# Patient Record
Sex: MALE | Race: WHITE | NOT HISPANIC OR LATINO | Employment: PART TIME | URBAN - METROPOLITAN AREA
[De-identification: names, ages, dates, MRNs, and addresses within clinical notes are randomized per-mention and may not be internally consistent; named-entity substitution may affect disease eponyms.]

---

## 2019-07-29 ENCOUNTER — APPOINTMENT (EMERGENCY)
Dept: RADIOLOGY | Facility: HOSPITAL | Age: 59
End: 2019-07-29
Payer: COMMERCIAL

## 2019-07-29 ENCOUNTER — HOSPITAL ENCOUNTER (EMERGENCY)
Facility: HOSPITAL | Age: 59
Discharge: HOME | End: 2019-07-29
Attending: EMERGENCY MEDICINE | Admitting: EMERGENCY MEDICINE
Payer: COMMERCIAL

## 2019-07-29 VITALS
RESPIRATION RATE: 16 BRPM | HEART RATE: 79 BPM | WEIGHT: 197.53 LBS | BODY MASS INDEX: 31 KG/M2 | SYSTOLIC BLOOD PRESSURE: 176 MMHG | OXYGEN SATURATION: 100 % | TEMPERATURE: 98.5 F | HEIGHT: 67 IN | DIASTOLIC BLOOD PRESSURE: 94 MMHG

## 2019-07-29 DIAGNOSIS — E11.621 DIABETIC FOOT ULCER (HCC): Primary | ICD-10-CM

## 2019-07-29 DIAGNOSIS — L97.509 DIABETIC FOOT ULCER (HCC): Primary | ICD-10-CM

## 2019-07-29 DIAGNOSIS — L03.115 CELLULITIS OF RIGHT LOWER EXTREMITY: Primary | ICD-10-CM

## 2019-07-29 LAB
ANION GAP SERPL CALCULATED.3IONS-SCNC: 10 MMOL/L (ref 4–13)
BASOPHILS # BLD AUTO: 0.09 THOUSANDS/ΜL (ref 0–0.1)
BASOPHILS NFR BLD AUTO: 1 % (ref 0–1)
BUN SERPL-MCNC: 25 MG/DL (ref 5–25)
CALCIUM SERPL-MCNC: 9.3 MG/DL (ref 8.3–10.1)
CHLORIDE SERPL-SCNC: 99 MMOL/L (ref 100–108)
CO2 SERPL-SCNC: 28 MMOL/L (ref 21–32)
CREAT SERPL-MCNC: 1.19 MG/DL (ref 0.6–1.3)
EOSINOPHIL # BLD AUTO: 0.22 THOUSAND/ΜL (ref 0–0.61)
EOSINOPHIL NFR BLD AUTO: 3 % (ref 0–6)
ERYTHROCYTE [DISTWIDTH] IN BLOOD BY AUTOMATED COUNT: 12.1 % (ref 11.6–15.1)
GFR SERPL CREATININE-BSD FRML MDRD: 67 ML/MIN/1.73SQ M
GLUCOSE SERPL-MCNC: 222 MG/DL (ref 65–140)
HCT VFR BLD AUTO: 37.9 % (ref 36.5–49.3)
HGB BLD-MCNC: 12.4 G/DL (ref 12–17)
IMM GRANULOCYTES # BLD AUTO: 0.04 THOUSAND/UL (ref 0–0.2)
IMM GRANULOCYTES NFR BLD AUTO: 1 % (ref 0–2)
LYMPHOCYTES # BLD AUTO: 2 THOUSANDS/ΜL (ref 0.6–4.47)
LYMPHOCYTES NFR BLD AUTO: 23 % (ref 14–44)
MCH RBC QN AUTO: 28.6 PG (ref 26.8–34.3)
MCHC RBC AUTO-ENTMCNC: 32.7 G/DL (ref 31.4–37.4)
MCV RBC AUTO: 88 FL (ref 82–98)
MONOCYTES # BLD AUTO: 0.91 THOUSAND/ΜL (ref 0.17–1.22)
MONOCYTES NFR BLD AUTO: 10 % (ref 4–12)
NEUTROPHILS # BLD AUTO: 5.45 THOUSANDS/ΜL (ref 1.85–7.62)
NEUTS SEG NFR BLD AUTO: 62 % (ref 43–75)
NRBC BLD AUTO-RTO: 0 /100 WBCS
PLATELET # BLD AUTO: 371 THOUSANDS/UL (ref 149–390)
PMV BLD AUTO: 8.8 FL (ref 8.9–12.7)
POTASSIUM SERPL-SCNC: 4.3 MMOL/L (ref 3.5–5.3)
RBC # BLD AUTO: 4.33 MILLION/UL (ref 3.88–5.62)
SODIUM SERPL-SCNC: 137 MMOL/L (ref 136–145)
WBC # BLD AUTO: 8.71 THOUSAND/UL (ref 4.31–10.16)

## 2019-07-29 PROCEDURE — 36415 COLL VENOUS BLD VENIPUNCTURE: CPT | Performed by: EMERGENCY MEDICINE

## 2019-07-29 PROCEDURE — 85025 COMPLETE CBC W/AUTO DIFF WBC: CPT | Performed by: EMERGENCY MEDICINE

## 2019-07-29 PROCEDURE — 99284 EMERGENCY DEPT VISIT MOD MDM: CPT | Performed by: EMERGENCY MEDICINE

## 2019-07-29 PROCEDURE — 99284 EMERGENCY DEPT VISIT MOD MDM: CPT

## 2019-07-29 PROCEDURE — 80048 BASIC METABOLIC PNL TOTAL CA: CPT | Performed by: EMERGENCY MEDICINE

## 2019-07-29 PROCEDURE — 73630 X-RAY EXAM OF FOOT: CPT

## 2019-07-29 RX ORDER — ROSUVASTATIN CALCIUM 5 MG/1
5 TABLET, COATED ORAL
COMMUNITY
End: 2021-09-30 | Stop reason: HOSPADM

## 2019-07-29 RX ORDER — CEPHALEXIN 250 MG/1
500 CAPSULE ORAL EVERY 12 HOURS SCHEDULED
Status: DISCONTINUED | OUTPATIENT
Start: 2019-07-29 | End: 2019-07-29 | Stop reason: HOSPADM

## 2019-07-29 RX ORDER — MELATONIN
5000 DAILY
COMMUNITY

## 2019-07-29 RX ORDER — SACCHAROMYCES BOULARDII 250 MG
250 CAPSULE ORAL 2 TIMES DAILY
Qty: 14 CAPSULE | Refills: 0 | Status: SHIPPED | OUTPATIENT
Start: 2019-07-29 | End: 2019-08-05

## 2019-07-29 RX ORDER — SACCHAROMYCES BOULARDII 250 MG
250 CAPSULE ORAL 2 TIMES DAILY
Status: DISCONTINUED | OUTPATIENT
Start: 2019-07-29 | End: 2019-07-29 | Stop reason: HOSPADM

## 2019-07-29 RX ORDER — CEPHALEXIN 500 MG/1
500 CAPSULE ORAL EVERY 12 HOURS SCHEDULED
Qty: 20 CAPSULE | Refills: 0 | Status: SHIPPED | OUTPATIENT
Start: 2019-07-29 | End: 2019-08-08

## 2019-07-29 RX ORDER — LOSARTAN POTASSIUM AND HYDROCHLOROTHIAZIDE 25; 100 MG/1; MG/1
1 TABLET ORAL DAILY
COMMUNITY
End: 2021-09-30 | Stop reason: HOSPADM

## 2019-07-29 RX ORDER — AMLODIPINE BESYLATE 2.5 MG/1
2.5 TABLET ORAL DAILY
COMMUNITY
End: 2019-09-07

## 2019-07-29 RX ORDER — CEPHALEXIN 250 MG/1
500 CAPSULE ORAL EVERY 12 HOURS SCHEDULED
Status: DISCONTINUED | OUTPATIENT
Start: 2019-07-29 | End: 2019-07-29

## 2019-07-29 RX ADMIN — Medication 250 MG: at 21:18

## 2019-07-29 RX ADMIN — CEPHALEXIN 500 MG: 250 CAPSULE ORAL at 20:40

## 2019-07-29 NOTE — ED NOTES
Kayleighkillian Escalanterebecca, wife, would like to be called for an update about patient  Phone number [phone]       Eunice Bueno RN  07/29/19 [id number]

## 2019-07-29 NOTE — ED PROVIDER NOTES
History  Chief Complaint   Patient presents with    Foot Ulcer     pt came to ER on direction of FMD for eval and treatment of ulcer on right foot  Pt states he did not realize that his foot was rubbing inside his shoe  +drainage  +malodorous     64-year-old male with a history of hypertension, diabetes, diabetic neuropathy presents with a 2 to three-week history of ulceration to the right foot  He states he has noticed that it has increased in size and has an odor which prompted his visit to the family doctor who referred him here  He denies any trauma  No fevers or chills  History provided by:  Patient   used: No    Medical Problem   Location:  Right foot  Severity:  Unable to specify  Onset quality:  Gradual  Duration:  2 weeks  Timing:  Constant  Progression:  Worsening  Chronicity:  New  Worsened by:  Nothing  Ineffective treatments:  None tried  Associated symptoms: no fever, no headaches and no rash        Prior to Admission Medications   Prescriptions Last Dose Informant Patient Reported? Taking? OMEGA-3 FATTY ACIDS PO   Yes Yes   Sig: Take 1,000 mg by mouth daily   amLODIPine (NORVASC) 2 5 mg tablet   Yes Yes   Sig: Take 2 5 mg by mouth daily   cholecalciferol (VITAMIN D3) 1,000 units tablet   Yes Yes   Sig: Take 5,000 Units by mouth daily   losartan-hydrochlorothiazide (HYZAAR) 100-25 MG per tablet   Yes Yes   Sig: Take 1 tablet by mouth daily   metFORMIN (GLUCOPHAGE) 1000 MG tablet   Yes Yes   Sig: Take 1,000 mg by mouth 2 (two) times a day with meals   rosuvastatin (CRESTOR) 5 mg tablet   Yes Yes   Sig: Take 5 mg by mouth daily      Facility-Administered Medications: None       Past Medical History:   Diagnosis Date    Diabetes mellitus ([address] )     Hypertension        History reviewed  No pertinent surgical history  History reviewed  No pertinent family history  I have reviewed and agree with the history as documented      Social History     Tobacco Use    Smoking status: Never Smoker    Smokeless tobacco: Never Used   Substance Use Topics    Alcohol use: Yes     Alcohol/week: 30 0 standard drinks     Types: 30 Cans of beer per week     Comment: 5-6 beers 4-5 x per week     Drug use: Never        Review of Systems   Constitutional: Negative  Negative for chills and fever  HENT: Negative  Eyes: Negative  Respiratory: Negative  Cardiovascular: Negative  Gastrointestinal: Negative  Genitourinary: Negative  Musculoskeletal: Negative for neck pain  Skin: Negative  Negative for rash  Allergic/Immunologic: Negative  Neurological: Negative  Negative for weakness, numbness and headaches  Hematological: Negative  Psychiatric/Behavioral: Negative  All other systems reviewed and are negative  Physical Exam  Physical Exam   Constitutional: He is oriented to person, place, and time  He appears well-developed and well-nourished  Non-toxic appearance  He does not have a sickly appearance  He does not appear ill  No distress  HENT:   Head: Normocephalic and atraumatic  Right Ear: External ear normal    Left Ear: External ear normal    Eyes: Pupils are equal, round, and reactive to light  Conjunctivae are normal  No scleral icterus  Cardiovascular: Normal rate, regular rhythm and normal heart sounds  Pulmonary/Chest: Effort normal and breath sounds normal    Musculoskeletal: Normal range of motion  He exhibits no edema, tenderness or deformity  See clinical images on chart  Malodorous foot ulceration to the plantar aspect right 5th toe  There is some maceration of the skin and central area of necrosis  No purulent drainage  No surrounding erythema  The foot is warm with palpable pulses  Lymphadenopathy:     He has no cervical adenopathy  Neurological: He is alert and oriented to person, place, and time  He has normal strength and normal reflexes  He exhibits normal muscle tone  Skin: Skin is warm and dry  No rash noted   He is not diaphoretic  No erythema  No pallor  Psychiatric: He has a normal mood and affect  Nursing note and vitals reviewed  Vital Signs  ED Triage Vitals [07/29/19 1633]   Temperature Pulse Respirations Blood Pressure SpO2   98 5 °F (36 9 °C) 85 16 (!) 174/85 97 %      Temp Source Heart Rate Source Patient Position - Orthostatic VS BP Location FiO2 (%)   Temporal Monitor Sitting Right arm --      Pain Score       3           Vitals:    07/29/19 1633   BP: (!) 174/85   Pulse: 85   Patient Position - Orthostatic VS: Sitting         Visual Acuity      ED Medications  Medications - No data to display    Diagnostic Studies  Results Reviewed     Procedure Component Value Units Date/Time    CBC and differential [[id number]]     Lab Status:  No result Specimen:  Blood     Basic metabolic panel [[id number]]     Lab Status:  No result Specimen:  Blood                  XR foot 3+ views RIGHT    (Results Pending)              Procedures  Procedures       ED Course                               MDM  Number of Diagnoses or Management Options  Diagnosis management comments: 60-year-old male presents with ulceration to the volar aspect of the right foot  He thinks this has been present for the last few weeks  It seems to be getting larger in size and was malodorous which prompted his visit to the family doctor today who sent him here  He has had no systemic symptoms  No prior history of diabetic ulcers  On exam he appears well in no distress  There is a rather large ulcer to the volar aspect of the lateral right foot  No surrounding erythema  Will obtain basic labs, x-ray of the foot to rule out osteomyelitis or gas gangrene    Podiatry consulted and will see patient       Amount and/or Complexity of Data Reviewed  Clinical lab tests: ordered and reviewed  Tests in the radiology section of CPT®: ordered and reviewed        Disposition  Final diagnoses:   None     ED Disposition     None      Follow-up Information None         Patient's Medications   Discharge Prescriptions    No medications on file     No discharge procedures on file      ED Provider  Electronically Signed by           Brown Lay DO  07/30/19 8406

## 2019-07-29 NOTE — ED NOTES
Pt states he has also been in the [facility] every weekend which may contribute to the infection on his foot        Zheng Navarro RN  07/29/19 [id number]

## 2019-07-30 PROCEDURE — 11042 DBRDMT SUBQ TIS 1ST 20SQCM/<: CPT | Performed by: PODIATRIST

## 2019-07-30 PROCEDURE — 99283 EMERGENCY DEPT VISIT LOW MDM: CPT | Performed by: PODIATRIST

## 2019-07-30 PROCEDURE — 11045 DBRDMT SUBQ TISS EACH ADDL: CPT | Performed by: PODIATRIST

## 2019-07-30 NOTE — DISCHARGE INSTRUCTIONS
Discharge Instructions - Podiatry    Weight Bearing Status:   Weight bear as tolerated  We passed this can all be offloaded in a special shoe or with padding around the wound  Pain: Continue analgesics as directed    F/u appointment Instructions: Please make an appointment within one week of discharge with Dr Luciano Tuttle  Contact sooner if any increase in pain, or signs of infection occur    Wound Care:  Keep the dressing clean dry and intact  If the dressing is to get wet please replace it with Betadine on the wound and dry sterile dressing

## 2019-07-30 NOTE — CONSULTS
Consult - Podiatry   Marti Christianson 63 y o  male MRN: [id number]  Unit/Bed#: ED 25 Encounter: [id number]    Assessment/Plan     Assessment:    1  Wound of the right plantar foot  Appears stable with no signs of systemic infection  2  Type 2 diabetes mellitus, poorly controlled  3  Peripheral Neuropathy    Plan:  · Pre-debridement wound measures 8cm x 5cm  Following a time out and verbal consent, Excisional debridement was performed with a #10 blade to remove non-viable necrotic tissue, skin and fat  Hemostasis was controlled with pressure  No pain noted during procedure  Post debridement measures 8 cm x 5cm x 0 3 cm  for a total of 40 square centimeters  Wound appears primarily granular, boggy  · He was educated on proper wound care  An importance of  clinician for care  · His educated on the importance of tight blood sugar control  · Is educated on the importance of keeping the wound dry  · He is prescribed Keflex 500 mg b i d  For 7 days, with prophylactic probiotic  · [facility] Vasili[facility] Dewey within 1 week    History of Present Illness     HPI:  Marti Christianson is a 63 y o  male who presents with right foot wound, right leg edema  He states that he has had this right foot plantar wound for about 2 weeks  He has done no local wound care  He states that he has been washing the wound at the end of work with no bandage placement following  Consults  Review of Systems   Constitutional: Negative  HENT: Negative  Eyes: Negative  Respiratory: Negative  Cardiovascular: Negative  Gastrointestinal: Negative  Musculoskeletal: negative    Skin:right foot wound   Neurological: Negative  Psych: Negative  Historical Information   Past Medical History:   Diagnosis Date    Diabetes mellitus ([facility] )     Hypertension      History reviewed  No pertinent surgical history    Social History   Social History     Substance and Sexual Activity Alcohol Use Yes    Alcohol/week: 30 0 standard drinks    Types: 30 Cans of beer per week    Comment: 5-6 beers 4-5 x per week      Social History     Substance and Sexual Activity   Drug Use Never     Social History     Tobacco Use   Smoking Status Never Smoker   Smokeless Tobacco Never Used     Family History: History reviewed  No pertinent family history  Meds/Allergies     (Not in a hospital admission)  No Known Allergies    Objective   First Vitals:   Blood Pressure: (!) 174/85 (07/29/19 1633)  Pulse: 85 (07/29/19 1633)  Temperature: 98 5 °F (36 9 °C) (07/29/19 1633)  Temp Source: Temporal (07/29/19 1633)  Respirations: 16 (07/29/19 1633)  Height: 5' 7" (170 2 cm) (07/29/19 1633)  Weight - Scale: 89 6 kg (197 lb 8 5 oz) (07/29/19 1633)  SpO2: 97 % (07/29/19 1633)    Current Vitals:   Blood Pressure: (!) 176/94 (07/29/19 1925)  Pulse: 79 (07/29/19 1925)  Temperature: 98 5 °F (36 9 °C) (07/29/19 1633)  Temp Source: Temporal (07/29/19 1633)  Respirations: 16 (07/29/19 1925)  Height: 5' 7" (170 2 cm) (07/29/19 1633)  Weight - Scale: 89 6 kg (197 lb 8 5 oz) (07/29/19 1633)  SpO2: 100 % (07/29/19 1925)        BP (!) 176/94 (BP Location: Right arm)   Pulse 79   Temp 98 5 °F (36 9 °C) (Temporal)   Resp 16   Ht 5' 7" (1 702 m)   Wt 89 6 kg (197 lb 8 5 oz)   SpO2 100%   BMI 30 94 kg/m²      General Appearance:    Alert, cooperative, no distress   Head:    Normocephalic, without obvious abnormality, atraumatic   Eyes:    PERRL, conjunctiva/corneas clear      Nose:   Moist mucous membranes   Neck:   Supple, symmetrical, trachea midline   Back:     Symmetric   Lungs:     Respirations unlabored   Heart:    Regular rate and rhythm   Abdomen:     Soft, non-tender   Extremities:   MMT is 5/5 to all compartments of the LE, +2/4 edema to right leg, edema 0/4 to left leg  No pain on palpation noted bilaterally  No calf tenderness noted bilaterally  Pulses:   Pedal pulses are palpable B/l, CFT< 3sec to all digits  Pedal hair is Absent   Skin:   Right foot plantar wound measuring approximately 8 cm x 5 cm  Post debridement primarily granular base with fibrotic plug in Center  Negative probe to bone  Malodor is noted on exam   No surrounding erythema  Wound is boggy to the touch  Periwound has hyperkeratotic ring  No purulence is noted on expression  No pain on palpation of the periwound proximal or dorsal    Neurologic:   Gross sensation is Diminished  Protective sensation is Diminished  Right foot      Lab, Imaging and other studies:   I have personally reviewed pertinent lab results  , CBC:   Lab Results   Component Value Date    WBC 8 71 07/29/2019    HGB 12 4 07/29/2019    HCT 37 9 07/29/2019    MCV 88 07/29/2019     07/29/2019    MCH 28 6 07/29/2019    MCHC 32 7 07/29/2019    RDW 12 1 07/29/2019    MPV 8 8 (L) 07/29/2019    NRBC 0 07/29/2019   , CMP:   Lab Results   Component Value Date    SODIUM 137 07/29/2019    K 4 3 07/29/2019    CL 99 (L) 07/29/2019    CO2 28 07/29/2019    BUN 25 07/29/2019    CREATININE 1 19 07/29/2019    CALCIUM 9 3 07/29/2019    EGFR 67 07/29/2019         Imaging: I have personally reviewed pertinent films in PACS  EKG, Pathology, and Other Studies: I have personally reviewed pertinent reports  Portions of the record may have been created with voice recognition software  Occasional wrong word or "sound a like" substitutions may have occurred due to the inherent limitations of voice recognition software  Read the chart carefully and recognize, using context, where substitutions have occurred

## 2019-08-06 ENCOUNTER — APPOINTMENT (OUTPATIENT)
Dept: WOUND CARE | Facility: HOSPITAL | Age: 59
End: 2019-08-06
Payer: COMMERCIAL

## 2019-08-06 PROCEDURE — G0463 HOSPITAL OUTPT CLINIC VISIT: HCPCS | Performed by: PODIATRIST

## 2019-08-06 PROCEDURE — 11042 DBRDMT SUBQ TIS 1ST 20SQCM/<: CPT | Performed by: PODIATRIST

## 2019-08-06 PROCEDURE — 99213 OFFICE O/P EST LOW 20 MIN: CPT | Performed by: PODIATRIST

## 2019-08-13 ENCOUNTER — APPOINTMENT (OUTPATIENT)
Dept: WOUND CARE | Facility: HOSPITAL | Age: 59
End: 2019-08-13
Payer: COMMERCIAL

## 2019-08-13 PROCEDURE — 11042 DBRDMT SUBQ TIS 1ST 20SQCM/<: CPT

## 2019-08-27 ENCOUNTER — APPOINTMENT (OUTPATIENT)
Dept: WOUND CARE | Facility: HOSPITAL | Age: 59
End: 2019-08-27
Payer: COMMERCIAL

## 2019-08-27 PROCEDURE — 11042 DBRDMT SUBQ TIS 1ST 20SQCM/<: CPT

## 2019-09-07 ENCOUNTER — HOSPITAL ENCOUNTER (INPATIENT)
Facility: HOSPITAL | Age: 59
LOS: 4 days | Discharge: HOME | DRG: 853 | End: 2019-09-11
Attending: EMERGENCY MEDICINE | Admitting: PODIATRIST
Payer: COMMERCIAL

## 2019-09-07 ENCOUNTER — ANESTHESIA EVENT (INPATIENT)
Dept: PERIOP | Facility: HOSPITAL | Age: 59
DRG: 853 | End: 2019-09-07
Payer: COMMERCIAL

## 2019-09-07 ENCOUNTER — APPOINTMENT (INPATIENT)
Dept: RADIOLOGY | Facility: HOSPITAL | Age: 59
DRG: 853 | End: 2019-09-07
Payer: COMMERCIAL

## 2019-09-07 ENCOUNTER — APPOINTMENT (INPATIENT)
Dept: CT IMAGING | Facility: HOSPITAL | Age: 59
DRG: 853 | End: 2019-09-07
Payer: COMMERCIAL

## 2019-09-07 ENCOUNTER — APPOINTMENT (EMERGENCY)
Dept: RADIOLOGY | Facility: HOSPITAL | Age: 59
DRG: 853 | End: 2019-09-07
Payer: COMMERCIAL

## 2019-09-07 DIAGNOSIS — L03.115 CELLULITIS OF RIGHT LOWER EXTREMITY: ICD-10-CM

## 2019-09-07 DIAGNOSIS — Z98.890 S/P FOOT SURGERY: ICD-10-CM

## 2019-09-07 DIAGNOSIS — E11.42 TYPE 2 DIABETES MELLITUS WITH DIABETIC POLYNEUROPATHY, WITHOUT LONG-TERM CURRENT USE OF INSULIN (HCC): ICD-10-CM

## 2019-09-07 DIAGNOSIS — Z79.4 TYPE 2 DIABETES MELLITUS WITH DIABETIC POLYNEUROPATHY, WITH LONG-TERM CURRENT USE OF INSULIN (HCC): ICD-10-CM

## 2019-09-07 DIAGNOSIS — I10 ESSENTIAL HYPERTENSION: ICD-10-CM

## 2019-09-07 DIAGNOSIS — E11.42 TYPE 2 DIABETES MELLITUS WITH DIABETIC POLYNEUROPATHY, WITH LONG-TERM CURRENT USE OF INSULIN (HCC): ICD-10-CM

## 2019-09-07 DIAGNOSIS — A48.0 GAS GANGRENE (HCC): Primary | ICD-10-CM

## 2019-09-07 LAB
ANION GAP SERPL CALCULATED.3IONS-SCNC: 9 MMOL/L (ref 4–13)
BACTERIA UR QL AUTO: ABNORMAL /HPF
BASOPHILS # BLD AUTO: 0.06 THOUSANDS/ΜL (ref 0–0.1)
BASOPHILS NFR BLD AUTO: 0 % (ref 0–1)
BILIRUB UR QL STRIP: NEGATIVE
BUN SERPL-MCNC: 19 MG/DL (ref 5–25)
CALCIUM SERPL-MCNC: 10.2 MG/DL (ref 8.3–10.1)
CHLORIDE SERPL-SCNC: 94 MMOL/L (ref 100–108)
CK SERPL-CCNC: 117 U/L (ref 39–308)
CLARITY UR: CLEAR
CO2 SERPL-SCNC: 26 MMOL/L (ref 21–32)
COLOR UR: YELLOW
COLOR, POC: YELLOW
CREAT SERPL-MCNC: 1.1 MG/DL (ref 0.6–1.3)
EOSINOPHIL # BLD AUTO: 0.05 THOUSAND/ΜL (ref 0–0.61)
EOSINOPHIL NFR BLD AUTO: 0 % (ref 0–6)
ERYTHROCYTE [DISTWIDTH] IN BLOOD BY AUTOMATED COUNT: 12.3 % (ref 11.6–15.1)
GFR SERPL CREATININE-BSD FRML MDRD: 74 ML/MIN/1.73SQ M
GLUCOSE SERPL-MCNC: 220 MG/DL (ref 65–140)
GLUCOSE SERPL-MCNC: 310 MG/DL (ref 65–140)
GLUCOSE UR STRIP-MCNC: NEGATIVE MG/DL
HCT VFR BLD AUTO: 34.4 % (ref 36.5–49.3)
HGB BLD-MCNC: 11.6 G/DL (ref 12–17)
HGB UR QL STRIP.AUTO: ABNORMAL
IMM GRANULOCYTES # BLD AUTO: 0.09 THOUSAND/UL (ref 0–0.2)
IMM GRANULOCYTES NFR BLD AUTO: 1 % (ref 0–2)
KETONES UR STRIP-MCNC: ABNORMAL MG/DL
LEUKOCYTE ESTERASE UR QL STRIP: NEGATIVE
LYMPHOCYTES # BLD AUTO: 1.55 THOUSANDS/ΜL (ref 0.6–4.47)
LYMPHOCYTES NFR BLD AUTO: 10 % (ref 14–44)
MCH RBC QN AUTO: 28.8 PG (ref 26.8–34.3)
MCHC RBC AUTO-ENTMCNC: 33.7 G/DL (ref 31.4–37.4)
MCV RBC AUTO: 85 FL (ref 82–98)
MONOCYTES # BLD AUTO: 1.6 THOUSAND/ΜL (ref 0.17–1.22)
MONOCYTES NFR BLD AUTO: 11 % (ref 4–12)
NEUTROPHILS # BLD AUTO: 11.89 THOUSANDS/ΜL (ref 1.85–7.62)
NEUTS SEG NFR BLD AUTO: 78 % (ref 43–75)
NITRITE UR QL STRIP: NEGATIVE
NON-SQ EPI CELLS URNS QL MICRO: ABNORMAL /HPF
NRBC BLD AUTO-RTO: 0 /100 WBCS
PH UR STRIP.AUTO: 5.5 [PH] (ref 4.5–8)
PLATELET # BLD AUTO: 315 THOUSANDS/UL (ref 149–390)
PMV BLD AUTO: 9.7 FL (ref 8.9–12.7)
POTASSIUM SERPL-SCNC: 3.7 MMOL/L (ref 3.5–5.3)
PROT UR STRIP-MCNC: ABNORMAL MG/DL
RBC # BLD AUTO: 4.03 MILLION/UL (ref 3.88–5.62)
RBC #/AREA URNS AUTO: ABNORMAL /HPF
SODIUM SERPL-SCNC: 129 MMOL/L (ref 136–145)
SP GR UR STRIP.AUTO: 1.01 (ref 1–1.03)
UROBILINOGEN UR QL STRIP.AUTO: 0.2 E.U./DL
WBC # BLD AUTO: 15.24 THOUSAND/UL (ref 4.31–10.16)
WBC #/AREA URNS AUTO: ABNORMAL /HPF

## 2019-09-07 PROCEDURE — 87070 CULTURE OTHR SPECIMN AEROBIC: CPT | Performed by: STUDENT IN AN ORGANIZED HEALTH CARE EDUCATION/TRAINING PROGRAM

## 2019-09-07 PROCEDURE — 73610 X-RAY EXAM OF ANKLE: CPT

## 2019-09-07 PROCEDURE — 82550 ASSAY OF CK (CPK): CPT | Performed by: EMERGENCY MEDICINE

## 2019-09-07 PROCEDURE — 99223 1ST HOSP IP/OBS HIGH 75: CPT | Performed by: PODIATRIST

## 2019-09-07 PROCEDURE — 96360 HYDRATION IV INFUSION INIT: CPT

## 2019-09-07 PROCEDURE — 73701 CT LOWER EXTREMITY W/DYE: CPT

## 2019-09-07 PROCEDURE — 73630 X-RAY EXAM OF FOOT: CPT

## 2019-09-07 PROCEDURE — 85025 COMPLETE CBC W/AUTO DIFF WBC: CPT | Performed by: EMERGENCY MEDICINE

## 2019-09-07 PROCEDURE — 87147 CULTURE TYPE IMMUNOLOGIC: CPT | Performed by: STUDENT IN AN ORGANIZED HEALTH CARE EDUCATION/TRAINING PROGRAM

## 2019-09-07 PROCEDURE — 81001 URINALYSIS AUTO W/SCOPE: CPT

## 2019-09-07 PROCEDURE — 36415 COLL VENOUS BLD VENIPUNCTURE: CPT | Performed by: EMERGENCY MEDICINE

## 2019-09-07 PROCEDURE — 87075 CULTR BACTERIA EXCEPT BLOOD: CPT | Performed by: STUDENT IN AN ORGANIZED HEALTH CARE EDUCATION/TRAINING PROGRAM

## 2019-09-07 PROCEDURE — 80048 BASIC METABOLIC PNL TOTAL CA: CPT | Performed by: EMERGENCY MEDICINE

## 2019-09-07 PROCEDURE — 71045 X-RAY EXAM CHEST 1 VIEW: CPT

## 2019-09-07 PROCEDURE — 87040 BLOOD CULTURE FOR BACTERIA: CPT | Performed by: STUDENT IN AN ORGANIZED HEALTH CARE EDUCATION/TRAINING PROGRAM

## 2019-09-07 PROCEDURE — 87186 SC STD MICRODIL/AGAR DIL: CPT | Performed by: STUDENT IN AN ORGANIZED HEALTH CARE EDUCATION/TRAINING PROGRAM

## 2019-09-07 PROCEDURE — 83036 HEMOGLOBIN GLYCOSYLATED A1C: CPT | Performed by: STUDENT IN AN ORGANIZED HEALTH CARE EDUCATION/TRAINING PROGRAM

## 2019-09-07 PROCEDURE — 99285 EMERGENCY DEPT VISIT HI MDM: CPT

## 2019-09-07 PROCEDURE — 82948 REAGENT STRIP/BLOOD GLUCOSE: CPT

## 2019-09-07 PROCEDURE — 99284 EMERGENCY DEPT VISIT MOD MDM: CPT | Performed by: EMERGENCY MEDICINE

## 2019-09-07 PROCEDURE — 87205 SMEAR GRAM STAIN: CPT | Performed by: STUDENT IN AN ORGANIZED HEALTH CARE EDUCATION/TRAINING PROGRAM

## 2019-09-07 PROCEDURE — 0JBQ0ZZ EXCISION OF RIGHT FOOT SUBCUTANEOUS TISSUE AND FASCIA, OPEN APPROACH: ICD-10-PCS | Performed by: PODIATRIST

## 2019-09-07 RX ORDER — OXYCODONE HYDROCHLORIDE AND ACETAMINOPHEN 5; 325 MG/1; MG/1
1 TABLET ORAL EVERY 6 HOURS PRN
Status: DISCONTINUED | OUTPATIENT
Start: 2019-09-07 | End: 2019-09-11 | Stop reason: HOSPADM

## 2019-09-07 RX ORDER — SODIUM CHLORIDE 9 MG/ML
125 INJECTION, SOLUTION INTRAVENOUS CONTINUOUS
Status: DISCONTINUED | OUTPATIENT
Start: 2019-09-07 | End: 2019-09-09

## 2019-09-07 RX ORDER — PRAVASTATIN SODIUM 40 MG
40 TABLET ORAL
Status: DISCONTINUED | OUTPATIENT
Start: 2019-09-08 | End: 2019-09-11 | Stop reason: HOSPADM

## 2019-09-07 RX ORDER — ACETAMINOPHEN 325 MG/1
650 TABLET ORAL EVERY 6 HOURS PRN
Status: DISCONTINUED | OUTPATIENT
Start: 2019-09-07 | End: 2019-09-09

## 2019-09-07 RX ORDER — LIDOCAINE HYDROCHLORIDE 10 MG/ML
10 INJECTION, SOLUTION EPIDURAL; INFILTRATION; INTRACAUDAL; PERINEURAL ONCE
Status: COMPLETED | OUTPATIENT
Start: 2019-09-07 | End: 2019-09-07

## 2019-09-07 RX ORDER — SODIUM CHLORIDE 9 MG/ML
125 INJECTION, SOLUTION INTRAVENOUS CONTINUOUS
Status: DISCONTINUED | OUTPATIENT
Start: 2019-09-07 | End: 2019-09-08

## 2019-09-07 RX ADMIN — SODIUM CHLORIDE 1000 ML: 0.9 INJECTION, SOLUTION INTRAVENOUS at 20:45

## 2019-09-07 RX ADMIN — SODIUM CHLORIDE 125 ML/HR: 0.9 INJECTION, SOLUTION INTRAVENOUS at 23:47

## 2019-09-07 RX ADMIN — LIDOCAINE HYDROCHLORIDE 10 ML: 10 INJECTION, SOLUTION EPIDURAL; INFILTRATION; INTRACAUDAL; PERINEURAL at 22:31

## 2019-09-07 RX ADMIN — IOHEXOL 100 ML: 350 INJECTION, SOLUTION INTRAVENOUS at 23:34

## 2019-09-07 RX ADMIN — VANCOMYCIN HYDROCHLORIDE 1250 MG: 1 INJECTION, POWDER, LYOPHILIZED, FOR SOLUTION INTRAVENOUS at 23:46

## 2019-09-07 RX ADMIN — CEFEPIME HYDROCHLORIDE 2000 MG: 2 INJECTION, POWDER, FOR SOLUTION INTRAVENOUS at 22:42

## 2019-09-08 ENCOUNTER — APPOINTMENT (INPATIENT)
Dept: RADIOLOGY | Facility: HOSPITAL | Age: 59
DRG: 853 | End: 2019-09-08
Payer: COMMERCIAL

## 2019-09-08 ENCOUNTER — ANESTHESIA (INPATIENT)
Dept: PERIOP | Facility: HOSPITAL | Age: 59
DRG: 853 | End: 2019-09-08
Payer: COMMERCIAL

## 2019-09-08 LAB
ANION GAP SERPL CALCULATED.3IONS-SCNC: 7 MMOL/L (ref 4–13)
BUN SERPL-MCNC: 16 MG/DL (ref 5–25)
CALCIUM SERPL-MCNC: 9.6 MG/DL (ref 8.3–10.1)
CHLORIDE SERPL-SCNC: 102 MMOL/L (ref 100–108)
CO2 SERPL-SCNC: 25 MMOL/L (ref 21–32)
CREAT SERPL-MCNC: 0.95 MG/DL (ref 0.6–1.3)
ERYTHROCYTE [DISTWIDTH] IN BLOOD BY AUTOMATED COUNT: 12.4 % (ref 11.6–15.1)
EST. AVERAGE GLUCOSE BLD GHB EST-MCNC: 209 MG/DL
GFR SERPL CREATININE-BSD FRML MDRD: 88 ML/MIN/1.73SQ M
GLUCOSE SERPL-MCNC: 164 MG/DL (ref 65–140)
GLUCOSE SERPL-MCNC: 185 MG/DL (ref 65–140)
GLUCOSE SERPL-MCNC: 224 MG/DL (ref 65–140)
GLUCOSE SERPL-MCNC: 252 MG/DL (ref 65–140)
GLUCOSE SERPL-MCNC: 255 MG/DL (ref 65–140)
GLUCOSE SERPL-MCNC: 278 MG/DL (ref 65–140)
GLUCOSE SERPL-MCNC: 296 MG/DL (ref 65–140)
HBA1C MFR BLD: 8.9 % (ref 4.2–6.3)
HCT VFR BLD AUTO: 33.7 % (ref 36.5–49.3)
HGB BLD-MCNC: 11 G/DL (ref 12–17)
MCH RBC QN AUTO: 28.7 PG (ref 26.8–34.3)
MCHC RBC AUTO-ENTMCNC: 32.6 G/DL (ref 31.4–37.4)
MCV RBC AUTO: 88 FL (ref 82–98)
PLATELET # BLD AUTO: 260 THOUSANDS/UL (ref 149–390)
PMV BLD AUTO: 9.3 FL (ref 8.9–12.7)
POTASSIUM SERPL-SCNC: 4.1 MMOL/L (ref 3.5–5.3)
RBC # BLD AUTO: 3.83 MILLION/UL (ref 3.88–5.62)
SODIUM SERPL-SCNC: 134 MMOL/L (ref 136–145)
WBC # BLD AUTO: 9.52 THOUSAND/UL (ref 4.31–10.16)

## 2019-09-08 PROCEDURE — 87070 CULTURE OTHR SPECIMN AEROBIC: CPT | Performed by: PODIATRIST

## 2019-09-08 PROCEDURE — 87075 CULTR BACTERIA EXCEPT BLOOD: CPT | Performed by: PODIATRIST

## 2019-09-08 PROCEDURE — 87186 SC STD MICRODIL/AGAR DIL: CPT | Performed by: PODIATRIST

## 2019-09-08 PROCEDURE — 0Y6M0ZF DETACHMENT AT RIGHT FOOT, PARTIAL 5TH RAY, OPEN APPROACH: ICD-10-PCS | Performed by: PODIATRIST

## 2019-09-08 PROCEDURE — 99252 IP/OBS CONSLTJ NEW/EST SF 35: CPT | Performed by: INTERNAL MEDICINE

## 2019-09-08 PROCEDURE — 93005 ELECTROCARDIOGRAM TRACING: CPT

## 2019-09-08 PROCEDURE — 87205 SMEAR GRAM STAIN: CPT | Performed by: PODIATRIST

## 2019-09-08 PROCEDURE — 88307 TISSUE EXAM BY PATHOLOGIST: CPT | Performed by: PATHOLOGY

## 2019-09-08 PROCEDURE — 87147 CULTURE TYPE IMMUNOLOGIC: CPT | Performed by: PODIATRIST

## 2019-09-08 PROCEDURE — 82948 REAGENT STRIP/BLOOD GLUCOSE: CPT

## 2019-09-08 PROCEDURE — 14350 FILLETED FINGER/TOE FLAP: CPT | Performed by: PODIATRIST

## 2019-09-08 PROCEDURE — 85027 COMPLETE CBC AUTOMATED: CPT | Performed by: STUDENT IN AN ORGANIZED HEALTH CARE EDUCATION/TRAINING PROGRAM

## 2019-09-08 PROCEDURE — 99024 POSTOP FOLLOW-UP VISIT: CPT | Performed by: PODIATRIST

## 2019-09-08 PROCEDURE — 88311 DECALCIFY TISSUE: CPT | Performed by: PATHOLOGY

## 2019-09-08 PROCEDURE — 28810 AMPUTATION TOE & METATARSAL: CPT | Performed by: PODIATRIST

## 2019-09-08 PROCEDURE — 73630 X-RAY EXAM OF FOOT: CPT

## 2019-09-08 PROCEDURE — 80048 BASIC METABOLIC PNL TOTAL CA: CPT | Performed by: STUDENT IN AN ORGANIZED HEALTH CARE EDUCATION/TRAINING PROGRAM

## 2019-09-08 PROCEDURE — 0JXQ0ZC TRANSFER RIGHT FOOT SUBCUTANEOUS TISSUE AND FASCIA WITH SKIN, SUBCUTANEOUS TISSUE AND FASCIA, OPEN APPROACH: ICD-10-PCS | Performed by: PODIATRIST

## 2019-09-08 RX ORDER — OXYCODONE HYDROCHLORIDE AND ACETAMINOPHEN 5; 325 MG/1; MG/1
1 TABLET ORAL EVERY 4 HOURS PRN
Status: DISCONTINUED | OUTPATIENT
Start: 2019-09-08 | End: 2019-09-11 | Stop reason: HOSPADM

## 2019-09-08 RX ORDER — FENTANYL CITRATE/PF 50 MCG/ML
25 SYRINGE (ML) INJECTION
Status: DISCONTINUED | OUTPATIENT
Start: 2019-09-08 | End: 2019-09-08 | Stop reason: HOSPADM

## 2019-09-08 RX ORDER — EPHEDRINE SULFATE 50 MG/ML
INJECTION INTRAVENOUS AS NEEDED
Status: DISCONTINUED | OUTPATIENT
Start: 2019-09-08 | End: 2019-09-08 | Stop reason: SURG

## 2019-09-08 RX ORDER — FENTANYL CITRATE 50 UG/ML
INJECTION, SOLUTION INTRAMUSCULAR; INTRAVENOUS AS NEEDED
Status: DISCONTINUED | OUTPATIENT
Start: 2019-09-08 | End: 2019-09-08 | Stop reason: SURG

## 2019-09-08 RX ORDER — LIDOCAINE HYDROCHLORIDE 20 MG/ML
INJECTION, SOLUTION EPIDURAL; INFILTRATION; INTRACAUDAL; PERINEURAL AS NEEDED
Status: DISCONTINUED | OUTPATIENT
Start: 2019-09-08 | End: 2019-09-08 | Stop reason: SURG

## 2019-09-08 RX ORDER — PROPOFOL 10 MG/ML
INJECTION, EMULSION INTRAVENOUS AS NEEDED
Status: DISCONTINUED | OUTPATIENT
Start: 2019-09-08 | End: 2019-09-08 | Stop reason: SURG

## 2019-09-08 RX ORDER — HYDROMORPHONE HCL/PF 1 MG/ML
0.5 SYRINGE (ML) INJECTION
Status: DISCONTINUED | OUTPATIENT
Start: 2019-09-08 | End: 2019-09-08 | Stop reason: HOSPADM

## 2019-09-08 RX ORDER — INSULIN GLARGINE 100 [IU]/ML
25 INJECTION, SOLUTION SUBCUTANEOUS
Status: DISCONTINUED | OUTPATIENT
Start: 2019-09-08 | End: 2019-09-09

## 2019-09-08 RX ORDER — MIDAZOLAM HYDROCHLORIDE 1 MG/ML
INJECTION INTRAMUSCULAR; INTRAVENOUS AS NEEDED
Status: DISCONTINUED | OUTPATIENT
Start: 2019-09-08 | End: 2019-09-08 | Stop reason: SURG

## 2019-09-08 RX ORDER — MAGNESIUM HYDROXIDE 1200 MG/15ML
LIQUID ORAL AS NEEDED
Status: DISCONTINUED | OUTPATIENT
Start: 2019-09-08 | End: 2019-09-08 | Stop reason: HOSPADM

## 2019-09-08 RX ORDER — ONDANSETRON 2 MG/ML
4 INJECTION INTRAMUSCULAR; INTRAVENOUS ONCE AS NEEDED
Status: DISCONTINUED | OUTPATIENT
Start: 2019-09-08 | End: 2019-09-08 | Stop reason: HOSPADM

## 2019-09-08 RX ORDER — ONDANSETRON 2 MG/ML
INJECTION INTRAMUSCULAR; INTRAVENOUS AS NEEDED
Status: DISCONTINUED | OUTPATIENT
Start: 2019-09-08 | End: 2019-09-08 | Stop reason: SURG

## 2019-09-08 RX ORDER — IBUPROFEN 400 MG/1
800 TABLET ORAL EVERY 6 HOURS PRN
Status: DISCONTINUED | OUTPATIENT
Start: 2019-09-08 | End: 2019-09-09

## 2019-09-08 RX ADMIN — VANCOMYCIN HYDROCHLORIDE 1250 MG: 1 INJECTION, POWDER, LYOPHILIZED, FOR SOLUTION INTRAVENOUS at 12:21

## 2019-09-08 RX ADMIN — INSULIN LISPRO 6 UNITS: 100 INJECTION, SOLUTION INTRAVENOUS; SUBCUTANEOUS at 08:50

## 2019-09-08 RX ADMIN — PHENYLEPHRINE HYDROCHLORIDE 100 MCG: 10 INJECTION INTRAVENOUS at 12:49

## 2019-09-08 RX ADMIN — INSULIN LISPRO 2 UNITS: 100 INJECTION, SOLUTION INTRAVENOUS; SUBCUTANEOUS at 22:07

## 2019-09-08 RX ADMIN — INSULIN LISPRO 4 UNITS: 100 INJECTION, SOLUTION INTRAVENOUS; SUBCUTANEOUS at 00:35

## 2019-09-08 RX ADMIN — PHENYLEPHRINE HYDROCHLORIDE 100 MCG: 10 INJECTION INTRAVENOUS at 12:58

## 2019-09-08 RX ADMIN — PROPOFOL 200 MG: 10 INJECTION, EMULSION INTRAVENOUS at 12:33

## 2019-09-08 RX ADMIN — EPHEDRINE SULFATE 10 MG: 50 INJECTION, SOLUTION INTRAVENOUS at 13:23

## 2019-09-08 RX ADMIN — PRAVASTATIN SODIUM 40 MG: 40 TABLET ORAL at 17:35

## 2019-09-08 RX ADMIN — INSULIN LISPRO 4 UNITS: 100 INJECTION, SOLUTION INTRAVENOUS; SUBCUTANEOUS at 11:48

## 2019-09-08 RX ADMIN — MIDAZOLAM 2 MG: 1 INJECTION INTRAMUSCULAR; INTRAVENOUS at 12:29

## 2019-09-08 RX ADMIN — INSULIN GLARGINE 25 UNITS: 100 INJECTION, SOLUTION SUBCUTANEOUS at 22:07

## 2019-09-08 RX ADMIN — FENTANYL CITRATE 50 MCG: 50 INJECTION, SOLUTION INTRAMUSCULAR; INTRAVENOUS at 12:32

## 2019-09-08 RX ADMIN — SODIUM CHLORIDE: 0.9 INJECTION, SOLUTION INTRAVENOUS at 13:28

## 2019-09-08 RX ADMIN — INSULIN LISPRO 2 UNITS: 100 INJECTION, SOLUTION INTRAVENOUS; SUBCUTANEOUS at 17:37

## 2019-09-08 RX ADMIN — CEFEPIME HYDROCHLORIDE 2000 MG: 2 INJECTION, POWDER, FOR SOLUTION INTRAVENOUS at 22:16

## 2019-09-08 RX ADMIN — EPHEDRINE SULFATE 10 MG: 50 INJECTION, SOLUTION INTRAVENOUS at 12:51

## 2019-09-08 RX ADMIN — ONDANSETRON 4 MG: 2 INJECTION INTRAMUSCULAR; INTRAVENOUS at 12:53

## 2019-09-08 RX ADMIN — VANCOMYCIN HYDROCHLORIDE 1250 MG: 1 INJECTION, POWDER, LYOPHILIZED, FOR SOLUTION INTRAVENOUS at 23:21

## 2019-09-08 RX ADMIN — CEFEPIME HYDROCHLORIDE 2000 MG: 2 INJECTION, POWDER, FOR SOLUTION INTRAVENOUS at 09:41

## 2019-09-08 RX ADMIN — PHENYLEPHRINE HYDROCHLORIDE 100 MCG: 10 INJECTION INTRAVENOUS at 12:44

## 2019-09-08 RX ADMIN — OXYCODONE HYDROCHLORIDE AND ACETAMINOPHEN 1 TABLET: 5; 325 TABLET ORAL at 21:54

## 2019-09-08 RX ADMIN — FENTANYL CITRATE 50 MCG: 50 INJECTION, SOLUTION INTRAMUSCULAR; INTRAVENOUS at 13:28

## 2019-09-08 RX ADMIN — LIDOCAINE HYDROCHLORIDE 100 MG: 20 INJECTION, SOLUTION EPIDURAL; INFILTRATION; INTRACAUDAL; PERINEURAL at 12:33

## 2019-09-08 NOTE — CONSULTS
Inpatient Medical Consultation - [facility]    Patient Information: Julian Meng 63 y o  male MRN: [id number]  Unit/Bed#: E5 -01 Encounter: [id number]  PCP: Tonia Askew DO  Date of Admission:  9/7/2019  Date of Consultation: 09/08/19  Requesting Physician: Rene Pastor DPM    Reason For Consultation:   Medical management patient with diabetic foot ulcer 5th toe gas gangrene    Assessment/Plan:    Hospital Problem List:     Principal Problem:    Gas gangrene ([facility] )  Active Problems:    Cellulitis of right lower extremity    Type 2 diabetes mellitus with diabetic polyneuropathy, without long-term current use of insulin ([facility] )      Plan:  · 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 and right foot cellulitis in relation diabetic foot ulcer defer wound care to Podiatry and antibiotic therapy to Infectious Disease consultant  X-ray did not show evidence of osteo myelitis changes on CT imaging placed on vancomycin cefepime empirically/to OR for I and D and right foot today  · Type 2 diabetes mellitus with diabetic polyneuropathy poorly controlled only on metformin for years with glycohemoglobin elevation of 9 9% will be placed on sliding scale coverage here but for proper healing in the postop setting and rehab setting is suggested the patient be placed on routine maintenance at least long-term insulin with glargine and possibly Humalog with meals  In need of diabetic teaching will start on Lantus 25 units q h s   Postoperatively  · Essential hypertension by history on combination of losartan 100 mg and hydrochlorothiazide 12 0 5 mg daily to be continued here  · Patient is low risk for contemplated surgical intervention and functional status is not impaired    VTE Prophylaxis: Enoxaparin (Lovenox)  / reason for no mechanical VTE prophylaxis Active wound     Recommendations for Discharge:  · Insulin maintenance and diabetic teaching resumption of metformin and consider increase dosing to 1000 mg b i d  Counseling / Coordination of Care Time: 30 minutes  Greater than 50% of total time spent on patient counseling and coordination of care  Collaboration of Care: Were Recommendations Directly Discussed with Primary Treatment Team? - No     History of Present Illness:    Ricardo Lim is a 63 y o  male who is originally admitted to the Podiatry * service on 9/7/2019 due to right set foot cellulitis and gas gangrene  We are consulted for medical management of his diabetes mellitus and hypertension  Patient has developed the increasing swelling to the forefoot of his right leg with history of a chronic right foot wound previously  Noticed increased redness and swelling for last week or so and is noted open wound to the bottom of his right foot for last several months  He has been followed by the wound [place] he has also noted increasing fatigue diminished appetite and possible fevers  He has a long term type 2 diabetic never been on insulin only been tainted on metformin at 500 mg twice a day with glycohemoglobin is routinely elevated in the past he also has a history of  hypertension presently treated with a combination of ARB and hydrochlorothiazide  Functionally the patient works as a  and still active physically till since last several days does not describe days anginal type symptoms shortness of breath on exertion  Review of Systems:    Review of Systems   Constitutional: Positive for fatigue  HENT: Negative  Eyes: Negative  Respiratory: Negative  Cardiovascular: Negative  Gastrointestinal: Negative  Endocrine: Negative  Genitourinary: Negative  Musculoskeletal: Positive for gait problem and joint swelling  Skin: Positive for rash and wound  Allergic/Immunologic: Negative  Hematological: Negative  Psychiatric/Behavioral: Negative          Past Medical and Surgical History:     Past Medical History:   Diagnosis Date    [facility] )  Hypertension        History reviewed  No pertinent surgical history  Meds/Allergies:    all medications and allergies reviewed    Allergies: No Known Allergies    Social History:     Marital Status: /Civil Union    Substance Use History:   Social History     Substance and Sexual Activity   Alcohol Use Yes    Alcohol/week: 30 0 standard drinks    Types: 30 Cans of beer per week    Comment: 5-6 beers 4-5 x per week      Social History     Tobacco Use   Smoking Status Never Smoker   Smokeless Tobacco Never Used     Social History     Substance and Sexual Activity   Drug Use Never       Family History:    non-contributory    Physical Exam:     Vitals:   Blood Pressure: 139/71 (09/08/19 0741)  Pulse: 66 (09/08/19 0741)  Temperature: 97 6 °F (36 4 °C) (09/08/19 0741)  Temp Source: Tympanic (09/08/19 0741)  Respirations: 18 (09/08/19 0741)  Weight - Scale: 90 7 kg (199 lb 15 3 oz) (09/07/19 1928)  SpO2: 97 % (09/08/19 0741)       General appearance: alert, appears stated age and cooperative  Head: Normocephalic, without obvious abnormality, atraumatic  Lungs: clear to auscultation bilaterally  Heart: regular rate and rhythm  Abdomen: soft, non-tender; bowel sounds normal; no masses,  no organomegaly  Back: negative, Right foot dressed with open ulcer in the plantar aspect of his 5th metatarsal above overlying erythema and redness and swelling  Extremities: Ulcer noted with wound plantar aspect of 5th metatarsal head and extremities normal, atraumatic, no cyanosis or edema  Neurologic: Grossly normal          Additional Data:     Lab Results:  I Have Reviewed All Lab Data Below:    Results from last 7 days   Lab Units 09/08/19  0614 09/07/19  2045   WBC Thousand/uL 9 52 15 24*   HEMOGLOBIN g/dL 11 0* 11 6*   HEMATOCRIT % 33 7* 34 4*   PLATELETS Thousands/uL 260 315   NEUTROS PCT %  --  78*   LYMPHS PCT %  --  10*   MONOS PCT %  --  11   EOS PCT %  --  0     Results from last 7 days   Lab Units 09/08/19  5560 POTASSIUM mmol/L 4 1   CHLORIDE mmol/L 102   CO2 mmol/L 25   BUN mg/dL 16   CREATININE mg/dL 0 95   CALCIUM mg/dL 9 6           * Additional Pertinent Lab Tests Reviewed: [address] [facility] Admission Reviewed    Imaging: I have personally reviewed pertinent reports  Xr Ankle 3+ Views Right    Result Date: 9/8/2019  Narrative: RIGHT ANKLE INDICATION:   cellulitis  COMPARISON:  None VIEWS:  XR ANKLE 3+ VW RIGHT FINDINGS: There is no acute fracture or dislocation  No significant degenerative changes  No lytic or blastic lesions seen  There is bimalleolar soft tissue swelling  There is also soft tissue emphysema surrounding the distal 5th metatarsal and proximal phalanx  Impression: No acute osseous abnormality  Bimalleolar soft tissue swelling  Soft tissue emphysema surrounding the left 5th metatarsophalangeal joint  Correlate for soft tissue infection  Workstation performed: [id number]     Xr Foot 3+ Views Right    Result Date: 9/8/2019  Narrative: RIGHT FOOT INDICATION:   cellulitis  5th toe ulcer  Swelling  COMPARISON:  July 29, 2019 VIEWS:  XR FOOT 3+ VW RIGHT FINDINGS: There is no acute fracture or dislocation  No significant degenerative changes  No periosteal reaction or cortical destruction to suggest osteomyelitis  The soft tissue defect adjacent to the 5th metatarsophalangeal joint corresponding to the ulcer is again identified  However, there has been development of subcutaneous emphysema and soft tissue swelling consistent with infection  Impression: 5th toe cellulitis with subcutaneous emphysema  No radiographic evidence for osteomyelitis  The study was marked in EPIC for significant notification  Workstation performed: [id number]     Ct Foot Right W Contrast    Result Date: 9/8/2019  Narrative: CT right foot with IV contrast INDICATION: Osteomyelitis suspected, foot swelling, diabetic soft tissue emphysema  COMPARISON: None   TECHNIQUE: CT examination of the right foot was performed  This examination, like all CT scans performed in the [facility], was performed utilizing techniques to minimize radiation dose exposure, including the use of iterative reconstruction and automated exposure control software  Sagittal and coronal two dimensional reconstructed images were also submitted for interpretation  IV Contrast: 100 mL of iohexol (OMNIPAQUE) Rad dose  296 mGy-cm FINDINGS: OSSEOUS STRUCTURES:  There are no erosive destructive changes in the 5th toe phalanges  Unremarkable 4th toe, 3rd toe, 2nd toe and 1st interphalangeal joint  The metatarsophalangeal joints appear unremarkable with no erosive changes Claw toe deformity of the 2nd, 3rd, 4th and 5th toes noted VISUALIZED MUSCULATURE:  There is a fluid collection noted at the dorsal lateral aspect of the midfoot, this measures 3 cm x 1 8 cm x 1 5 cm, this is located in close proximity to the extensor digitorum longus  SOFT TISSUES:  There is packing noted at the dorsal aspect of the forefoot at the level of the 5th toe /5th metatarsophalangeal joint with foci of air, sequela of recent debridement Ulceration in the plantar aspect of the 4th at the level of the 5th metatarsophalangeal joint communicates with the foci of air at the dorsal aspect of the 5th toe OTHER PERTINENT FINDINGS:  Soft tissue edema at the dorsum  Mineralized vasculature noted in the tibial vessels The peroneal tendons appear prominent and enlarged, peroneal tenosynovitis is noted    Impression: There is a fluid collection which measures 3 cm x 1 8 x 1 5 cm at the dorsal lateral aspect of the midfoot, in close proximity to the extensor digitorum longus predominantly in relation to the tendon slip of the 5th toe This is seen in image 20 of series 402 and image 60 of series 3 There are no erosive changes at the level of the 5th metatarsophalangeal joint or in the 5th toe to suggest osteomyelitis   There is an ulceration at the plantar aspect of the 5th metatarsophalangeal joint with a sinus tract which extends into the dorsal aspect with foci of air and packing from recent debridement  Small foci of air are also noted in relation the 5th middle phalanx The peroneal tendons appear enlarged and prominent, peroneal tenosynovitis is not excluded If concern for osteomyelitis persist MRI can be considered The study was marked in EPIC for immediate notification   Workstation performed: [id number]         ** Please Note: Dragon 360 Dictation speech to text software may have been used in the creation of this document **

## 2019-09-08 NOTE — ANESTHESIA PREPROCEDURE EVALUATION
Review of Systems/Medical History          Cardiovascular  Hypertension ,    Pulmonary       GI/Hepatic  Negative GI/hepatic ROS          Negative  ROS        Endo/Other  Diabetes poorly controlled type 2 Oral agent,      GYN       Hematology  Negative hematology ROS      Musculoskeletal  Negative musculoskeletal ROS   Comment: Gas gangrene of right foot      Neurology  Negative neurology ROS      Psychology   Negative psychology ROS              Physical Exam    Airway  Comment: Full Beard  Mallampati score: II  TM Distance: >3 FB  Neck ROM: full     Dental   No notable dental hx     Cardiovascular  Rhythm: regular, Rate: normal, Cardiovascular exam normal    Pulmonary  Pulmonary exam normal Breath sounds clear to auscultation,     Other Findings        Anesthesia Plan  ASA Score- 2     Anesthesia Type- general with ASA Monitors  Additional Monitors:   Airway Plan: LMA  Plan Factors-    Induction- intravenous  Postoperative Plan- Plan for postoperative opioid use  Informed Consent- Anesthetic plan and risks discussed with patient

## 2019-09-08 NOTE — UTILIZATION REVIEW
Initial Clinical Review    Admission: Date/Time/Statement: Inpatient Admission Orders (From admission, onward)     Ordered        09/07/19 2238  Inpatient Admission  Once                   Orders Placed This Encounter   Procedures    Inpatient Admission     Standing Status:   Standing     Number of Occurrences:   1     Order Specific Question:   Admitting Physician     Answer:   Pako Mckeon [[id number]]     Order Specific Question:   Level of Care     Answer:   Med Surg [[id number]]     Order Specific Question:   Estimated length of stay     Answer:   More than 2 Midnights     Order Specific Question:   Certification     Answer:   I certify that inpatient services are medically necessary for this patient for a duration of greater than two midnights  See H&P and MD Progress Notes for additional information about the patient's course of treatment  ED Arrival Information     Expected Arrival Acuity Means of Arrival Escorted By Service Admission Type    - 9/7/2019 19:26 Urgent Walk-In Self Podiatry Urgent    Arrival Complaint    Fever; Wound Eval        Chief Complaint   Patient presents with    Weakness - Generalized     patient reports dark yellow urine, diarrhea, foot and hand pain, right leg swelling, chronic wound on right leg  headache  Assessment/Plan:  62 yo male presented to ER from home as inpatient admission for gas gangrene  Patient c/o right foot swelling and redness,red and pain  Patient states temp 102 @ home, generalized weakness, fatigue,decreased dark urine headache and dyspepsia   Patient was on antibiotics 6 week ago  ' patient PMH DM type 2  HTN  Plan consult podiatry, [plan OR 09-08-19  Preop Diagnosis:  Gas gangrene ([facility] [address] ) [A48 0]  Cellulitis of right lower extremity [L03 115]     Post-Op Diagnosis Codes:     * Gas gangrene ([address] ) [A48 0]     * Cellulitis of right lower extremity [X13 138]     Procedure(s) (LRB):  INCISION AND DRAINAGE (I&D) EXTREMITY (Right)   Right partial 5th ray amputation with toe fillet flap   21632 CPT Code filleted finger/toe flap  76339 Amputation, metatarsal, with toe, single  Anesthesia Type:   LMA  Operative Indications:  Gas gangrene ([address] ) [A48 0]  Cellulitis of right lower extremity [T05 230]    Operative Findings:  Removal of all nonviable soft tissue and bone; primary closure obtained with toe fillet flap; will require close monitoring in the next 24-48 hours for flap viability      Right sub 5 met wound extends to level of tendon, right lateral foot skin changes consistent with epidermolysis, purulent drainage                               ED Triage Vitals   Temperature Pulse Respirations Blood Pressure SpO2   09/07/19 1928 09/07/19 1929 09/07/19 1929 09/07/19 1929 09/07/19 1929   98 7 °F (37 1 °C) 101 18 161/69 96 %      Temp Source Heart Rate Source Patient Position - Orthostatic VS BP Location FiO2 (%)   09/07/19 1928 09/07/19 1929 09/07/19 1929 09/07/19 1929 --   Temporal Monitor Sitting Right arm       Pain Score       09/07/19 1929       7        Wt Readings from Last 1 Encounters:   09/07/19 90 7 kg (199 lb 15 3 oz)     Additional Vital Signs:   09/08/19 1342  98 9 °F (37 2 °C)  76  16  117/73  97 %  Simple mask     09/08/19 0741  97 6 °F (36 4 °C)  66  18  139/71  97 %  None (Room air)  Lying   09/07/19 2330  97 4 °F (36 3 °C)Abnormal   77  18  144/69  96 %  None          Pertinent Labs/Diagnostic Test Results:   Results from last 7 days   Lab Units 09/08/19  0614 09/07/19  2045   WBC Thousand/uL 9 52 15 24*   HEMOGLOBIN g/dL 11 0* 11 6*   HEMATOCRIT % 33 7* 34 4*   PLATELETS Thousands/uL 260 315   NEUTROS ABS Thousands/µL  --  11 89*         Results from last 7 days   Lab Units 09/08/19  0614 09/07/19  2045   SODIUM mmol/L 134* 129*   POTASSIUM mmol/L 4 1 3 7   CHLORIDE mmol/L 102 94*   CO2 mmol/L 25 26   ANION GAP mmol/L 7 9   BUN mg/dL 16 19   CREATININE mg/dL 0 95 1 10   EGFR ml/min/1 73sq m 88 74   CALCIUM mg/dL 9 6 10 2*         Results from last 7 days   Lab Units 09/08/19  1059 09/08/19  0743 09/08/19  0611 09/07/19  2349   POC GLUCOSE mg/dl 224* 255* 296* 310*     Results from last 7 days   Lab Units 09/08/19  0614 09/07/19  2045   GLUCOSE RANDOM mg/dL 278* 220*     Results from last 7 days   Lab Units 09/07/19  2045   HEMOGLOBIN A1C % 8 9*   EAG mg/dl 209     Results from last 7 days   Lab Units 09/07/19  2045   CK TOTAL U/L 117     Results from last 7 days   Lab Units 09/07/19  2051 09/07/19 2049   CLARITY UA   --  Clear   COLOR UA  yellow Yellow   SPEC GRAV UA   --  1 015   PH UA   --  5 5   GLUCOSE UA mg/dl  --  Negative   KETONES UA mg/dl  --  15 (1+)*   BLOOD UA   --  Trace*   PROTEIN UA mg/dl  --  30 (1+)*   NITRITE UA   --  Negative   BILIRUBIN UA   --  Negative   UROBILINOGEN UA E U /dl  --  0 2   LEUKOCYTES UA   --  Negative   WBC UA /hpf  --  None Seen   RBC UA /hpf  --  1-2*   BACTERIA UA /hpf  --  Occasional   EPITHELIAL CELLS WET PREP /hpf  --  Occasional     Results from last 7 days   Lab Units 09/07/19  2228   GRAM STAIN RESULT  1+ Polys*  2+ Gram positive cocci in pairs*     Right ankle xr 08-09-19  No acute osseous abnormality  Bimalleolar soft tissue swelling    Soft tissue emphysema surrounding the left 5th metatarsophalangeal joint  Correlate for soft tissue infection      Ct foot 09-08-19  Impression:     There is a fluid collection which measures 3 cm x 1 8 x 1 5 cm at the dorsal lateral aspect of the midfoot, in close proximity to the extensor digitorum longus predominantly in relation to the tendon slip of the 5th toe  This is seen in image 20 of series 402 and image 60 of series 3  There are no erosive changes at the level of the 5th metatarsophalangeal joint or in the 5th toe to suggest osteomyelitis    There is an ulceration at the plantar aspect of the 5th metatarsophalangeal joint with a sinus tract which extends into the dorsal aspect with foci of air and packing from recent debridement   Small foci of air are also noted in relation the 5th middle   phalanx     Right foot xr 09-08-19  5th toe cellulitis with subcutaneous emphysema   No radiographic evidence for osteomyelitis        ED Treatment:   Medication Administration from 09/07/2019 1926 to 09/07/2019 2332       Date/Time Order Dose Route Action Action by Comments     09/07/2019 2045 sodium chloride 0 9 % bolus 1,000 mL 1,000 mL Intravenous New [id number] [facility] Monico Pierce RN      09/07/2019 2231 lidocaine (PF) (XYLOCAINE-MPF) 1 % injection 10 mL 10 mL Infiltration Given by Other Jameson Guerrero RN by oJjo Richmond     09/07/2019 2242 cefepime (MAXIPIME) 2 g/50 mL dextrose IVPB 2,000 mg Intravenous New Bag Jameson Guerrero RN         Past Medical History:   Diagnosis Date    Diabetes mellitus ([facility] )     Hypertension      Present on Admission:  **None**      Admitting Diagnosis: Gas gangrene ([facility] ) [A48 0]  Essential hypertension [I10]  Fever [R50 9]  Cellulitis of right lower extremity [L03 115]  Type 2 diabetes mellitus with diabetic polyneuropathy, with long-term current use of insulin ([place] [address] ) [E11 42, Z79 4]  Age/Sex: 63 y o  male  Admission Orders:    Current Facility-Administered Medications:  [MAR Hold] acetaminophen 650 mg Oral Q6H PRN   cefepime 2,000 mg Intravenous Q12H   [MAR Hold] enoxaparin 40 mg Subcutaneous Daily   [MAR Hold] insulin glargine 25 Units Subcutaneous HS   [MAR Hold] insulin lispro 1-5 Units Subcutaneous HS   [MAR Hold] insulin lispro 2-12 Units Subcutaneous TID AC   losartan potassium-hydrochlorothiazide (HYZAAR 100/25) combo dose  Oral Daily   oxyCODONE-acetaminophen 1 tablet Oral Q6H PRN   pravastatin 40 mg Oral Daily With Dinner   sodium chloride 125 mL/hr Intravenous Continuous   sodium chloride 125 mL/hr Intravenous Continuous   vancomycin 15 mg/kg Intravenous Q12H     Facility-Administered Medications Ordered in Other Encounters:  ePHEDrine   PRN   fentanyl citrate (PF)   PRN   lidocaine (PF)  Intravenous PRN   midazolam   PRN   ondansetron  Intravenous PRN phenylephrine   PRN   propofol  Intravenous PRN       IP CONSULT TO INFECTIOUS DISEASES  IP CONSULT TO INTERNAL MEDICINE  IP CONSULT TO PHARMACY  Npo  OR 08-08-19  Blood sugars ac and hs  scd      TRACKING NUMBER [id number]  [address] Utilization Review Department  Phone: [phone]; Fax [phone]  [email or web address]  ATTENTION: Please call with any questions or concerns to [phone]  and carefully listen to the prompts so that you are directed to the right person  Send all requests for admission clinical reviews, approved or denied determinations and any other requests to fax [phone]   All voicemails are confidential

## 2019-09-08 NOTE — QUICK NOTE
I attempted to see patient today but he is in the OR  I reviewed the chart and agree with empiric vancomycin/cefepime for now  We will see formally in consult tomorrow  Please call with any questions in the interim

## 2019-09-08 NOTE — OP NOTE
OPERATIVE REPORT  PATIENT NAME: Phani Davis    :  1960  MRN: [id number]  Pt Location: [facility] OR ROOM 02    SURGERY DATE: 2019    Surgeon(s) and Role:     * Tejas Mosqueda DPM - Primary     * Rossi Li DPM - Assisting    Preop Diagnosis:  Gas gangrene ([facility] [address] ) [A48 0]  Cellulitis of right lower extremity [L03 115]    Post-Op Diagnosis Codes:     * Gas gangrene ([facility] [address] ) [A48 0]     * Cellulitis of right lower extremity [W30 494]    Procedure(s) (LRB):  INCISION AND DRAINAGE (I&D) EXTREMITY (Right)   Right partial 5th ray amputation with toe fillet flap   11333 CPT Code filleted finger/toe flap  08440 Amputation, metatarsal, with toe, single    Specimen(s):  ID Type Source Tests Collected by Time Destination   1 : 5th metatarsal Tissue Foot, Right TISSUE EXAM Tejas Mosqueda DPM 2019 1254    A :  Wound Foot, Right WOUND CULTURE Tejas Mosqueda DPM 2019 1255    B :  Wound Foot, Right ANAEROBIC CULTURE AND GRAM STAIN, WOUND CULTURE Tejas Mosqueda DPM 2019 1256        Estimated Blood Loss:   50 mL    Anesthesia Type:   LMA    Operative Indications:  [facility] ([facility] ) [A48 0]  Cellulitis of right lower extremity [Y35 892]      Operative Findings:  Removal of all nonviable soft tissue and bone; primary closure obtained with toe fillet flap; will require close monitoring in the next 24-48 hours for flap viability     Complications:   None    Procedure and Technique:  Under mild sedation the patient was brought into the operating room and transferred to operating table in a supine position  Sedation was achieved by anesthesia team with LMA and a universal time-out was performed where all parties in agreement of the correct patient, correct procedure and correct site  20 cc of 1:1 mixture 1% lidocaine plain and cord percent Marcaine plain was then injected into the right foot in a reverse Narayanan block    A pneumatic tourniquet was placed over the right calf with ample padding however was never inflated during the entire procedure  The foot was then prepped and draped in usual aseptic manner  Attention was directed to the necrotic skin on the lateral side of right foot  Skin incision was made using a 15 blade straight down to bone to remove all viable and devitalized tissue  Purulent drainage near the metatarsophalangeal joint was noted and deep wound cultures were taken at this time  Using sagittal saw approximately 1/2 of the 5th metatarsal was resected and this was passed off the operating field for routine pathology review  The 5th toe was then fillet and all phalanges were removed including the nail bed and nail matrix  The wound was then irrigated with 3L of normal saline using pulse lavage  All remaining soft tissue and bone appeared to be bleeding and viable  A toe filet flap was then rotated approximately to cover the surgical wound  Skin was reapproximated using 3-0 nylon and 2-0 Prolene  Surgical incision was dressed with Adaptic and dry sterile dressing with Ace wrap  Patient tolerated procedure well without immediate complications and transferred to PACU with vital signs stable  Dr Radha Arora was present for the entire procedure and participated in all key aspects of the procedure      Patient Disposition:  PACU  and hemodynamically stable    SIGNATURE: Gila Calzada DPM  DATE: September 8, 2019  TIME: 1:50 PM

## 2019-09-08 NOTE — PROGRESS NOTES
Vancomycin Assessment    Rhiannon Gonzalez is a 63 y o  male who is currently receiving vancomycin 1250 mg IV q12h for skin-soft tissue infection     Relevant clinical data and objective history reviewed:  Creatinine   Date Value Ref Range Status   09/07/2019 1 10 0 60 - 1 30 mg/dL Final     Comment:     Standardized to IDMS reference method   07/29/2019 1 19 0 60 - 1 30 mg/dL Final     Comment:     Standardized to IDMS reference method     /69 (BP Location: Left arm)   Pulse 77   Temp (!) 97 4 °F (36 3 °C) (Temporal)   Resp 18   Wt 90 7 kg (199 lb 15 3 oz)   SpO2 96%   BMI 31 32 kg/m²   No intake/output data recorded  Lab Results   Component Value Date/Time    BUN 19 09/07/2019 08:45 PM    WBC 15 24 (H) 09/07/2019 08:45 PM    HGB 11 6 (L) 09/07/2019 08:45 PM    HCT 34 4 (L) 09/07/2019 08:45 PM    MCV 85 09/07/2019 08:45 PM     09/07/2019 08:45 PM     Temp Readings from Last 3 Encounters:   09/07/19 (!) 97 4 °F (36 3 °C) (Temporal)   07/29/19 98 5 °F (36 9 °C) (Temporal)     Vancomycin Days of Therapy: 1    Assessment/Plan  The patient is currently on vancomycin utilizing scheduled dosing based on actual body weight  The patient is currently receiving 1250 mg IV q12h and is clinically appropriate and dose will be continued  Pharmacy will also follow closely for s/sx of nephrotoxicity, infusion reactions and appropriateness of therapy  BMP and CBC will be ordered per protocol  Plan for trough as patient approaches steady state, prior to the 4th  dose at approximately [phone] on 9/9/19  Due to infection severity, will target a trough of 15-20 (appropriate for most indications)   Pharmacy will continue to follow the patients culture results and clinical progress daily      Farhana Fall, Pharmacist

## 2019-09-08 NOTE — PROGRESS NOTES
Progress Note - Podiatry  Faye Friday 63 y o  male MRN: [id number]  Unit/Bed#: OR POOL Encounter: [id number]    Assessment:  62yo male with PMH of DM2 with neuropathy presents with right foot gas gangrene s/p bedside decompression      1) Right foot 5th toe gas gangrene  2) Right foot cellulitis  3) Right plantar diabetic foot ulcer  4) Poorly controlled diabetes mellitus type 2 - last Hgb A1c 9 9% (4/25/19)  5) Hypertension     Plan:  - to OR today for I&D and right foot partial 5th ray amputation with psb wound vac application, NPO since MN w IVF  - informed consent reviewed with patient, he understands the risks and benefits of the procedure  - patient will be NWB to RLE post-op  - appreciate SLIM for medical mgmt and ID for abx mgmt    Subjective/Objective   Chief Complaint:   Chief Complaint   Patient presents with    Weakness - Generalized     patient reports dark yellow urine, diarrhea, foot and hand pain, right leg swelling, chronic wound on right leg  headache  Subjective: 63 y o  y/o male was seen and evaluated at bedside  No acute events overnight  States he is anxious about his procedure today  Blood pressure 139/71, pulse 66, temperature 97 6 °F (36 4 °C), temperature source Tympanic, resp  rate 18, weight 90 7 kg (199 lb 15 3 oz), SpO2 97 %  ,Body mass index is 31 32 kg/m²      Invasive Devices     Peripheral Intravenous Line            Peripheral IV 09/07/19 Right Antecubital less than 1 day                Physical Exam:   General: Alert, cooperative and no distress  Lungs: Non labored breathing  Heart: Regular rhythm and rate  Abdomen: non-tender, non-distended  Lower Extremities: right foot dressings clean, dry and intact      Lab, Imaging and other studies:   CBC:   Lab Results   Component Value Date    WBC 9 52 09/08/2019    HGB 11 0 (L) 09/08/2019    HCT 33 7 (L) 09/08/2019    MCV 88 09/08/2019     09/08/2019    MCH 28 7 09/08/2019    MCHC 32 6 09/08/2019    RDW 12 4 09/08/2019    MPV 9 3 09/08/2019    NRBC 0 09/07/2019       Imaging: I have personally reviewed pertinent films in PACS  EKG, Pathology, and Other Studies: I have personally reviewed pertinent reports  VTE Pharmacologic Prophylaxis: Enoxaparin (Lovenox)    Portions of the record may have been created with voice recognition software  Occasional wrong word or "sound a like" substitutions may have occurred due to the inherent limitations of voice recognition software  Read the chart carefully and recognize, using context, where substitutions have occurred

## 2019-09-08 NOTE — ANESTHESIA POSTPROCEDURE EVALUATION
Post-Op Assessment Note    CV Status:  Stable  Pain Score: 0    Pain management: adequate     Mental Status:  Alert and awake   Hydration Status:  Euvolemic   PONV Controlled:  Controlled   Airway Patency:  Patent   Post Op Vitals Reviewed: Yes      Staff: Anesthesiologist           BP      Temp     Pulse     Resp      SpO2

## 2019-09-08 NOTE — ED PROVIDER NOTES
History  Chief Complaint   Patient presents with    Weakness - Generalized     patient reports dark yellow urine, diarrhea, foot and hand pain, right leg swelling, chronic wound on right leg  headache  Pt is a 63year old male with a PMH of Type II DM, chronic diabetic foot ulcer, and hypertension presenting with right foot swelling and redness  States today he noticed his foot and ankle became swollen, painful red  States he had fever of 102 prior to coming in, but "I dont trust the thermometer"  States he has been going to wound care for chronic wound on the sole of his right foot at the base of his right 5th digit  He know has a new blister that has opened on his right lateral foot on the dorsum of his foot base of 5th digit  Also admits to generalized weakness and fatigue, decreased dark urine, headache and dyspepsia  Antibiotics 6 weeks ago  Prior to Admission Medications   Prescriptions Last Dose Informant Patient Reported? Taking? cholecalciferol (VITAMIN D3) 1,000 units tablet   Yes Yes   Sig: Take 5,000 Units by mouth daily   losartan-hydrochlorothiazide (HYZAAR) 100-25 MG per tablet   Yes Yes   Sig: Take 1 tablet by mouth daily   metFORMIN (GLUCOPHAGE) 1000 MG tablet   Yes Yes   Sig: Take 1,000 mg by mouth 2 (two) times a day with meals   rosuvastatin (CRESTOR) 5 mg tablet   Yes Yes   Sig: Take 5 mg by mouth Twice a week      Facility-Administered Medications: None       Past Medical History:   Diagnosis Date    Diabetes mellitus ([facility] [address] )     Hypertension        History reviewed  No pertinent surgical history  History reviewed  No pertinent family history  I have reviewed and agree with the history as documented  Social History     Tobacco Use    Smoking status: Never Smoker    Smokeless tobacco: Never Used   Substance Use Topics    Alcohol use:  Yes     Alcohol/week: 30 0 standard drinks     Types: 30 Cans of beer per week     Comment: 5-6 beers 4-5 x per week     Drug use: Never Review of Systems   Constitutional: Positive for fatigue and fever  Negative for chills and diaphoresis  Respiratory: Negative for shortness of breath  Cardiovascular: Negative for chest pain  Gastrointestinal: Positive for abdominal pain  Negative for diarrhea, nausea and vomiting  Genitourinary: Positive for decreased urine volume  Negative for difficulty urinating, dysuria, flank pain, frequency, hematuria and urgency  Musculoskeletal: Positive for arthralgias and joint swelling  Skin: Positive for wound  Neurological: Positive for weakness and headaches  Negative for dizziness and light-headedness  Physical Exam  Physical Exam   Constitutional: He is oriented to person, place, and time  He appears well-developed and well-nourished  No distress  HENT:   Head: Normocephalic and atraumatic  Eyes: Conjunctivae and EOM are normal    Neck: Normal range of motion  Neck supple  Cardiovascular: Normal rate, regular rhythm, normal heart sounds and intact distal pulses  Pulses:       Dorsalis pedis pulses are 2+ on the right side, and 2+ on the left side  Pulmonary/Chest: Effort normal and breath sounds normal    Abdominal: Soft  Bowel sounds are normal  He exhibits no distension  There is no tenderness  Musculoskeletal: Normal range of motion  Feet:    Swelling, tenderness, erythema, warmth of the dorsum of the right foot extending to right ankle  Normal ROM and strength  Pulse found with doppler  Neurological: He is alert and oriented to person, place, and time  He has normal strength  No sensory deficit  Skin: Skin is warm and dry  He is not diaphoretic         Vital Signs  ED Triage Vitals   Temperature Pulse Respirations Blood Pressure SpO2   09/07/19 1928 09/07/19 1929 09/07/19 1929 09/07/19 1929 09/07/19 1929   98 7 °F (37 1 °C) 101 18 161/69 96 %      Temp Source Heart Rate Source Patient Position - Orthostatic VS BP Location FiO2 (%)   09/07/19 1928 09/07/19 1929 09/07/19 1929 09/07/19 1929 --   Temporal Monitor Sitting Right arm       Pain Score       09/07/19 1929       7           Vitals:    09/07/19 1929 09/07/19 2128 09/07/19 2326   BP: 161/69 143/67 118/58   Pulse: 101 85 83   Patient Position - Orthostatic VS: Sitting Lying Lying         Visual Acuity      ED Medications  Medications   cefepime (MAXIPIME) 2 g/50 mL dextrose IVPB (2,000 mg Intravenous New Bag 9/7/19 2242)   vancomycin (VANCOCIN) 1,250 mg in sodium chloride 0 9 % 250 mL IVPB (1,250 mg Intravenous New Bag 9/7/19 2346)   losartan potassium-hydrochlorothiazide (HYZAAR 100/25) combo dose (has no administration in time range)   pravastatin (PRAVACHOL) tablet 40 mg (has no administration in time range)   sodium chloride 0 9 % infusion (125 mL/hr Intravenous Not Given 9/7/19 2335)   enoxaparin (LOVENOX) subcutaneous injection 40 mg (has no administration in time range)   insulin lispro (HumaLOG) 100 units/mL subcutaneous injection 2-12 Units (has no administration in time range)   insulin lispro (HumaLOG) 100 units/mL subcutaneous injection 1-5 Units (has no administration in time range)   acetaminophen (TYLENOL) tablet 650 mg (has no administration in time range)   sodium chloride 0 9 % infusion (125 mL/hr Intravenous New Bag 9/7/19 2347)   oxyCODONE-acetaminophen (PERCOCET) 5-325 mg per tablet 1 tablet (has no administration in time range)   sodium chloride 0 9 % bolus 1,000 mL (0 mL Intravenous Stopped 9/7/19 2129)   lidocaine (PF) (XYLOCAINE-MPF) 1 % injection 10 mL (10 mL Infiltration Given by Other 9/7/19 2231)   iohexol (OMNIPAQUE) 350 MG/ML injection (MULTI-DOSE) 100 mL (100 mL Intravenous Given 9/7/19 2334)       Diagnostic Studies  Results Reviewed     Procedure Component Value Units Date/Time    Hemoglobin A1c w/EAG Estimation (Orders if not completed within the last 90 days) [[id number]] Collected:  09/07/19 2045    Lab Status:   In process Specimen:  Blood from Arm, Right Updated:  09/07/19 2349 Anaerobic culture and Gram stain [[id number]] Collected:  09/07/19 2228    Lab Status: In process Specimen:  Wound Updated:  09/07/19 2242    Wound culture and Gram stain [[id number]] Collected:  09/07/19 2228    Lab Status: In process Specimen:  Wound from Toe, Right Updated:  09/07/19 2242    Blood culture [[id number]] Collected:  09/07/19 1951    Lab Status: In process Specimen:  Blood from Arm, Right Updated:  09/07/19 2242    Blood culture [[id number]] Collected:  09/07/19 2236    Lab Status:   In process Specimen:  Blood from Arm, Left Updated:  09/07/19 3693    Basic metabolic panel [[id number]]  (Abnormal) Collected:  09/07/19 2045    Lab Status:  Final result Specimen:  Blood from Arm, Right Updated:  09/07/19 2106     Sodium 129 mmol/L      Potassium 3 7 mmol/L      Chloride 94 mmol/L      CO2 26 mmol/L      ANION GAP 9 mmol/L      BUN 19 mg/dL      Creatinine 1 10 mg/dL      Glucose 220 mg/dL      Calcium 10 2 mg/dL      eGFR 74 ml/min/1 73sq m     Narrative:       [place] guidelines for Chronic Kidney Disease (CKD):     Stage 1 with normal or high GFR (GFR > 90 mL/min/1 73 square meters)    Stage 2 Mild CKD (GFR = 60-89 mL/min/1 73 square meters)    Stage 3A Moderate CKD (GFR = 45-59 mL/min/1 73 square meters)    Stage 3B Moderate CKD (GFR = 30-44 mL/min/1 73 square meters)    Stage 4 Severe CKD (GFR = 15-29 mL/min/1 73 square meters)    Stage 5 End Stage CKD (GFR <15 mL/min/1 73 square meters)  Note: GFR calculation is accurate only with a steady state creatinine    CK (with reflex to MB) [[id number]]  (Normal) Collected:  09/07/19 2045    Lab Status:  Final result Specimen:  Blood from Arm, Right Updated:  09/07/19 2106     Total  U/L     Urine Microscopic [[id number]]  (Abnormal) Collected:  09/07/19 2049    Lab Status:  Final result Specimen:  Urine, Clean Catch Updated:  09/07/19 2105     RBC, UA 1-2 /hpf      WBC, UA None Seen /hpf      Epithelial Cells Occasional /hpf      Bacteria, UA Occasional /hpf     CBC and differential [[id number]]  (Abnormal) Collected:  09/07/19 2045    Lab Status:  Final result Specimen:  Blood from Arm, Right Updated:  09/07/19 2053     WBC 15 24 Thousand/uL      RBC 4 03 Million/uL      Hemoglobin 11 6 g/dL      Hematocrit 34 4 %      MCV 85 fL      MCH 28 8 pg      MCHC 33 7 g/dL      RDW 12 3 %      MPV 9 7 fL      Platelets 557 Thousands/uL      nRBC 0 /100 WBCs      Neutrophils Relative 78 %      Immat GRANS % 1 %      Lymphocytes Relative 10 %      Monocytes Relative 11 %      Eosinophils Relative 0 %      Basophils Relative 0 %      Neutrophils Absolute 11 89 Thousands/µL      Immature Grans Absolute 0 09 Thousand/uL      Lymphocytes Absolute 1 55 Thousands/µL      Monocytes Absolute 1 60 Thousand/µL      Eosinophils Absolute 0 05 Thousand/µL      Basophils Absolute 0 06 Thousands/µL     POCT urinalysis dipstick [[id number]]  (Normal) Resulted:  09/07/19 2051    Lab Status:  Final result Specimen:  Urine Updated:  09/07/19 2051     Color, UA yellow    ED Urine Macroscopic [[id number]]  (Abnormal) Collected:  09/07/19 2049    Lab Status:  Final result Specimen:  Urine Updated:  09/07/19 2050     Color, UA Yellow     Clarity, UA Clear     pH, UA 5 5     Leukocytes, UA Negative     Nitrite, UA Negative     Protein, UA 30 (1+) mg/dl      Glucose, UA Negative mg/dl      Ketones, UA 15 (1+) mg/dl      Urobilinogen, UA 0 2 E U /dl      Bilirubin, UA Negative     Blood, UA Trace     Specific [place], UA 1 015    Narrative:       CLINITEK RESULT                 XR foot 3+ views RIGHT    (Results Pending)   XR ankle 3+ views RIGHT    (Results Pending)   CT foot right w contrast    (Results Pending)   XR chest portable    (Results Pending)              Procedures  Procedures       ED Course  ED Course as of Sep 07 2350   Sat Sep 07, 2019   2122 Patient appears to have acute cellulitis likely secondary to new wound on right foot   Elevated white count at 15 with fever at home of 102  Spoke to podiatry who will come to ED to evaluate the patient and likely admit  2243 Podiatry drained foot after gas seen on Xr  Antibiotics initiated  Pt being admitted  MDM    Disposition  Final diagnoses:   Gas gangrene ([facility] )   Cellulitis of right lower extremity     Time reflects when diagnosis was documented in both MDM as applicable and the Disposition within this note     Time User Action Codes Description Comment    9/7/2019 10:35 PM Keely Riff [A48 0] Gas gangrene ([facility] [address] )     9/7/2019 10:35 PM Jarad Din [A48 0] Gas gangrene ([facility] [address] )     9/7/2019 10:35 PM Meri Milks Add [K23 279] Cellulitis of right lower extremity     9/7/2019 10:35 PM Meri Milks Add [E11 42,  Z79 4] Type 2 diabetes mellitus with diabetic polyneuropathy, with long-term current use of insulin ([facility] )     9/7/2019 10:35 PM Meri Milks Add [I10] Essential hypertension     9/7/2019 10:45 PM Becka Wright Modify [A48 0] Gas gangrene ([facility] [address] )     9/7/2019 10:45 PM Becka Delatorre Modify [L03 115] Cellulitis of right lower extremity       ED Disposition     ED Disposition Condition Date/Time Comment    No Disposition Selected  Sat Sep 7, 2019 10:45 PM Case was discussed with Podiatry and the patient's admission status was agreed to be Admission Status: inpatient status to the service of Dr Jacoby Kent   Follow-up Information    None         Current Discharge Medication List      CONTINUE these medications which have NOT CHANGED    Details   cholecalciferol (VITAMIN D3) 1,000 units tablet Take 5,000 Units by mouth daily      losartan-hydrochlorothiazide (HYZAAR) 100-25 MG per tablet Take 1 tablet by mouth daily      metFORMIN (GLUCOPHAGE) 1000 MG tablet Take 1,000 mg by mouth 2 (two) times a day with meals      rosuvastatin (CRESTOR) 5 mg tablet Take 5 mg by mouth Twice a week           No discharge procedures on file      ED Provider  Electronically Signed by           Bhupinder Mcarthur PA-C  09/07/19 1013 Brooke Stevens PA-C  09/07/19 3502

## 2019-09-08 NOTE — H&P
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  2) Right foot cellulitis  3) Right plantar diabetic foot ulcer  4) Poorly controlled diabetes mellitus type 2 - last Hgb A1c 9 9% (4/25/19)  5) Hypertension    Plan:  - admit under podiatry service for IV abx and surgical intervention, consult IM and ID  - XR reviewed: soft tissue emphysema noted to right 5th toe, CT pending  - patient is afebrile with WBC 15, nontoxic in appearance, hemodynamically stable, bedside decompression performed at bedside in ED with #15 blade on dorsal aspect of 5th digit, purulent drainage noted, irrigated with NSS, packed with 1/4" iodoform packing  - to OR 9/8/19 with Dr Deb Guerrero for I&D right foot, NPO at [facility] w IVF  - f/u wound cultures, IV cefepime and IV vancomycin given in ED, will continue while on floor  - home meds continued with exception of metformin, insulin sliding scale added, appreciate SLIM for medial management    History of Present Illness   Mr Alexia Wadsworth this a 50-year-old male with past medical history significant for type 2 diabetes with peripheral neuropathy, hypertension, chronic right foot wound presents with right foot swelling and redness  Patient states approximately 3 days ago he started noticing his right foot and ankle started swelling with increased redness  He states he has had a open wound on the bottom of his right foot for a couple months and he has been seeing Dr Deb Guerrero at [address]  He states his wife changes his dressings on his foot and states the redness and color changes to the outside of his right foot did not appear normal   Denies any purulent drainage or malodor  He states he has been feeling fatigue, loss of appetite, subjective fever      Review of Systems Constitutional: Positive for activity change, appetite change, chills and fatigue  HENT: Negative  Eyes: Negative  Negative for photophobia and visual disturbance  Respiratory: Negative  Negative for cough and shortness of breath  Cardiovascular: Negative  Negative for chest pain and palpitations  Gastrointestinal: Negative  Negative for abdominal pain and diarrhea  Genitourinary: Negative  Negative for difficulty urinating, dysuria and hematuria  Musculoskeletal: Positive for joint swelling  Skin: Positive for color change and wound  Historical Information   Past Medical History:   Diagnosis Date    Diabetes mellitus ([address] )     Hypertension      History reviewed  No pertinent surgical history  Social History   Social History     Substance and Sexual Activity   Alcohol Use Yes    Alcohol/week: 30 0 standard drinks    Types: 30 Cans of beer per week    Comment: 5-6 beers 4-5 x per week      Social History     Substance and Sexual Activity   Drug Use Never     Social History     Tobacco Use   Smoking Status Never Smoker   Smokeless Tobacco Never Used     Family History: non-contributory    Meds/Allergies   PTA meds:   Prior to Admission Medications   Prescriptions Last Dose Informant Patient Reported? Taking?    cholecalciferol (VITAMIN D3) 1,000 units tablet   Yes Yes   Sig: Take 5,000 Units by mouth daily   losartan-hydrochlorothiazide (HYZAAR) 100-25 MG per tablet   Yes Yes   Sig: Take 1 tablet by mouth daily   metFORMIN (GLUCOPHAGE) 1000 MG tablet   Yes Yes   Sig: Take 1,000 mg by mouth 2 (two) times a day with meals   rosuvastatin (CRESTOR) 5 mg tablet   Yes Yes   Sig: Take 5 mg by mouth Twice a week      Facility-Administered Medications: None     No Known Allergies    Objective   First Vitals:   Blood Pressure: 161/69 (09/07/19 1929)  Pulse: 101 (09/07/19 1929)  Temperature: 98 7 °F (37 1 °C) (09/07/19 1928)  Temp Source: Temporal (09/07/19 1928)  Respirations: 18 (09/07/19 1929)  Weight - Scale: 90 7 kg (199 lb 15 3 oz) (09/07/19 1928)  SpO2: 96 % (09/07/19 1929)    Current Vitals:   Blood Pressure: 143/67 (09/07/19 2128)  Pulse: 85 (09/07/19 2128)  Temperature: 98 7 °F (37 1 °C) (09/07/19 1928)  Temp Source: Temporal (09/07/19 1928)  Respirations: 18 (09/07/19 2128)  Weight - Scale: 90 7 kg (199 lb 15 3 oz) (09/07/19 1928)  SpO2: 96 % (09/07/19 2128)      Intake/Output Summary (Last 24 hours) at 9/7/2019 2239  Last data filed at 9/7/2019 2129  Gross per 24 hour   Intake 1000 ml   Output    Net 1000 ml       Invasive Devices     Peripheral Intravenous Line            Peripheral IV 09/07/19 Right Antecubital less than 1 day                Physical Exam   Constitutional: He is oriented to person, place, and time  He appears well-developed and well-nourished  HENT:   Head: Normocephalic and atraumatic  Neck: Normal range of motion  Neck supple  No JVD present  No tracheal deviation present  Cardiovascular: Normal rate, regular rhythm and normal heart sounds  Pedal pulses palpable on left foot, dopplerable on right    Pulmonary/Chest: Effort normal and breath sounds normal  No respiratory distress  Abdominal: Soft  Bowel sounds are normal  There is no tenderness  There is no guarding  Musculoskeletal: Normal range of motion  He exhibits edema  Neurological: He is alert and oriented to person, place, and time  A sensory deficit is present  Gross sensation intact; protective sensation absent   Skin: Skin is warm and dry  Capillary refill takes less than 2 seconds  There is erythema  Right sub 5 met wound extends to level of tendon, right lateral foot skin changes consistent with epidermolysis, purulent drainage             Counseling / Coordination of Care: Total floor / unit time spent today 45 minutes

## 2019-09-08 NOTE — PLAN OF CARE
Problem: Nutrition/Hydration-ADULT  Goal: Nutrient/Hydration intake appropriate for improving, restoring or maintaining nutritional needs  Description  Monitor and assess patient's nutrition/hydration status for malnutrition  Collaborate with interdisciplinary team and initiate plan and interventions as ordered  Monitor patient's weight and dietary intake as ordered or per policy  Utilize nutrition screening tool and intervene as necessary  Determine patient's food preferences and provide high-protein, high-caloric foods as appropriate  INTERVENTIONS:  - Monitor oral intake, urinary output, labs, and treatment plans  - Assess nutrition and hydration status and recommend course of action  - Evaluate amount of meals eaten  - Assist patient with eating if necessary   - Allow adequate time for meals  - Recommend/ encourage appropriate diets, oral nutritional supplements, and vitamin/mineral supplements  - Order, calculate, and assess calorie counts as needed  - Recommend, monitor, and adjust tube feedings and TPN/PPN based on assessed needs  - Assess need for intravenous fluids  - Provide specific nutrition/hydration education as appropriate  - Include patient/family/caregiver in decisions related to nutrition  Outcome: Progressing     Problem: Potential for Falls  Goal: Patient will remain free of falls  Description  INTERVENTIONS:  - Assess patient frequently for physical needs  -  Identify cognitive and physical deficits and behaviors that affect risk of falls    -  [place] fall precautions as indicated by assessment   - Educate patient/family on patient safety including physical limitations  - Instruct patient to call for assistance with activity based on assessment  - Modify environment to reduce risk of injury  - Consider OT/PT consult to assist with strengthening/mobility  Outcome: Progressing     Problem: METABOLIC, FLUID AND ELECTROLYTES - ADULT  Goal: Electrolytes maintained within normal limits  Description  INTERVENTIONS:  - Monitor labs and assess patient for signs and symptoms of electrolyte imbalances  - Administer electrolyte replacement as ordered  - Monitor response to electrolyte replacements, including repeat lab results as appropriate  - Instruct patient on fluid and nutrition as appropriate  Outcome: Progressing  Goal: Fluid balance maintained  Description  INTERVENTIONS:  - Monitor labs   - Monitor I/O and WT  - Instruct patient on fluid and nutrition as appropriate  - Assess for signs & symptoms of volume excess or deficit  Outcome: Progressing  Goal: Glucose maintained within target range  Description  INTERVENTIONS:  - Monitor Blood Glucose as ordered  - Assess for signs and symptoms of hyperglycemia and hypoglycemia  - Administer ordered medications to maintain glucose within target range  - Assess nutritional intake and initiate nutrition service referral as needed  Outcome: Progressing     Problem: SKIN/TISSUE INTEGRITY - ADULT  Goal: Skin integrity remains intact  Description  INTERVENTIONS  - Identify patients at risk for skin breakdown  - Assess and monitor skin integrity  - Assess and monitor nutrition and hydration status  - Monitor labs (i e  albumin)  - Assess for incontinence   - Turn and reposition patient  - Assist with mobility/ambulation  - Relieve pressure over bony prominences  - Avoid friction and shearing  - Provide appropriate hygiene as needed including keeping skin clean and dry  - Evaluate need for skin moisturizer/barrier cream  - Collaborate with interdisciplinary team (i e  Nutrition, Rehabilitation, etc )   - Patient/family teaching  Outcome: Progressing  Goal: Incision(s), wounds(s) or drain site(s) healing without S/S of infection  Description  INTERVENTIONS  - Assess and document risk factors for skin impairment   - Assess and document dressing, incision, wound bed, drain sites and surrounding tissue  - Consider nutrition services referral as needed  - Oral mucous membranes remain intact  - Provide patient/ family education  Outcome: Progressing     Problem: PAIN - ADULT  Goal: Verbalizes/displays adequate comfort level or baseline comfort level  Description  Interventions:  - Encourage patient to monitor pain and request assistance  - Assess pain using appropriate pain scale  - Administer analgesics based on type and severity of pain and evaluate response  - Implement non-pharmacological measures as appropriate and evaluate response  - Consider cultural and social influences on pain and pain management  - Notify physician/advanced practitioner if interventions unsuccessful or patient reports new pain  Outcome: Progressing     Problem: INFECTION - ADULT  Goal: Absence or prevention of progression during hospitalization  Description  INTERVENTIONS:  - Assess and monitor for signs and symptoms of infection  - Monitor lab/diagnostic results  - Monitor all insertion sites, i e  indwelling lines, tubes, and drains  - Monitor endotracheal if appropriate and nasal secretions for changes in amount and color  - [place] appropriate cooling/warming therapies per order  - Administer medications as ordered  - Instruct and encourage patient and family to use good hand hygiene technique  - Identify and instruct in appropriate isolation precautions for identified infection/condition  Outcome: Progressing

## 2019-09-08 NOTE — PLAN OF CARE
Problem: Nutrition/Hydration-ADULT  Goal: Nutrient/Hydration intake appropriate for improving, restoring or maintaining nutritional needs  Description  Monitor and assess patient's nutrition/hydration status for malnutrition  Collaborate with interdisciplinary team and initiate plan and interventions as ordered  Monitor patient's weight and dietary intake as ordered or per policy  Utilize nutrition screening tool and intervene as necessary  Determine patient's food preferences and provide high-protein, high-caloric foods as appropriate  INTERVENTIONS:  - Monitor oral intake, urinary output, labs, and treatment plans  - Assess nutrition and hydration status and recommend course of action  - Evaluate amount of meals eaten  - Assist patient with eating if necessary   - Allow adequate time for meals  - Recommend/ encourage appropriate diets, oral nutritional supplements, and vitamin/mineral supplements  - Order, calculate, and assess calorie counts as needed  - Recommend, monitor, and adjust tube feedings and TPN/PPN based on assessed needs  - Assess need for intravenous fluids  - Provide specific nutrition/hydration education as appropriate  - Include patient/family/caregiver in decisions related to nutrition  Outcome: Progressing     Problem: Potential for Falls  Goal: Patient will remain free of falls  Description  INTERVENTIONS:  - Assess patient frequently for physical needs  -  Identify cognitive and physical deficits and behaviors that affect risk of falls    -  [place] fall precautions as indicated by assessment   - Educate patient/family on patient safety including physical limitations  - Instruct patient to call for assistance with activity based on assessment  - Modify environment to reduce risk of injury  - Consider OT/PT consult to assist with strengthening/mobility  Outcome: Progressing     Problem: METABOLIC, FLUID AND ELECTROLYTES - ADULT  Goal: Electrolytes maintained within normal limits  Description  INTERVENTIONS:  - Monitor labs and assess patient for signs and symptoms of electrolyte imbalances  - Administer electrolyte replacement as ordered  - Monitor response to electrolyte replacements, including repeat lab results as appropriate  - Instruct patient on fluid and nutrition as appropriate  Outcome: Progressing  Goal: Fluid balance maintained  Description  INTERVENTIONS:  - Monitor labs   - Monitor I/O and WT  - Instruct patient on fluid and nutrition as appropriate  - Assess for signs & symptoms of volume excess or deficit  Outcome: Progressing  Goal: Glucose maintained within target range  Description  INTERVENTIONS:  - Monitor Blood Glucose as ordered  - Assess for signs and symptoms of hyperglycemia and hypoglycemia  - Administer ordered medications to maintain glucose within target range  - Assess nutritional intake and initiate nutrition service referral as needed  Outcome: Progressing     Problem: SKIN/TISSUE INTEGRITY - ADULT  Goal: Skin integrity remains intact  Description  INTERVENTIONS  - Identify patients at risk for skin breakdown  - Assess and monitor skin integrity  - Assess and monitor nutrition and hydration status  - Monitor labs (i e  albumin)  - Assess for incontinence   - Turn and reposition patient  - Assist with mobility/ambulation  - Relieve pressure over bony prominences  - Avoid friction and shearing  - Provide appropriate hygiene as needed including keeping skin clean and dry  - Evaluate need for skin moisturizer/barrier cream  - Collaborate with interdisciplinary team (i e  Nutrition, Rehabilitation, etc )   - Patient/family teaching  Outcome: Progressing  Goal: Incision(s), wounds(s) or drain site(s) healing without S/S of infection  Description  INTERVENTIONS  - Assess and document risk factors for skin impairment   - Assess and document dressing, incision, wound bed, drain sites and surrounding tissue  - Consider nutrition services referral as needed  - Oral mucous membranes remain intact  - Provide patient/ family education  Outcome: Progressing     Problem: PAIN - ADULT  Goal: Verbalizes/displays adequate comfort level or baseline comfort level  Description  Interventions:  - Encourage patient to monitor pain and request assistance  - Assess pain using appropriate pain scale  - Administer analgesics based on type and severity of pain and evaluate response  - Implement non-pharmacological measures as appropriate and evaluate response  - Consider cultural and social influences on pain and pain management  - Notify physician/advanced practitioner if interventions unsuccessful or patient reports new pain  Outcome: Progressing     Problem: INFECTION - ADULT  Goal: Absence or prevention of progression during hospitalization  Description  INTERVENTIONS:  - Assess and monitor for signs and symptoms of infection  - Monitor lab/diagnostic results  - Monitor all insertion sites, i e  indwelling lines, tubes, and drains  - Monitor endotracheal if appropriate and nasal secretions for changes in amount and color  - [place] appropriate cooling/warming therapies per order  - Administer medications as ordered  - Instruct and encourage patient and family to use good hand hygiene technique  - Identify and instruct in appropriate isolation precautions for identified infection/condition  Outcome: Progressing

## 2019-09-09 PROBLEM — D64.9 ANEMIA: Status: ACTIVE | Noted: 2019-09-09

## 2019-09-09 PROBLEM — I10 ESSENTIAL HYPERTENSION: Status: ACTIVE | Noted: 2019-09-09

## 2019-09-09 LAB
ANION GAP SERPL CALCULATED.3IONS-SCNC: 6 MMOL/L (ref 4–13)
ATRIAL RATE: 64 BPM
BUN SERPL-MCNC: 15 MG/DL (ref 5–25)
CALCIUM SERPL-MCNC: 8.4 MG/DL (ref 8.3–10.1)
CHLORIDE SERPL-SCNC: 105 MMOL/L (ref 100–108)
CO2 SERPL-SCNC: 25 MMOL/L (ref 21–32)
CREAT SERPL-MCNC: 0.97 MG/DL (ref 0.6–1.3)
ERYTHROCYTE [DISTWIDTH] IN BLOOD BY AUTOMATED COUNT: 12.7 % (ref 11.6–15.1)
GFR SERPL CREATININE-BSD FRML MDRD: 86 ML/MIN/1.73SQ M
GLUCOSE SERPL-MCNC: 148 MG/DL (ref 65–140)
GLUCOSE SERPL-MCNC: 174 MG/DL (ref 65–140)
GLUCOSE SERPL-MCNC: 175 MG/DL (ref 65–140)
GLUCOSE SERPL-MCNC: 235 MG/DL (ref 65–140)
GLUCOSE SERPL-MCNC: 250 MG/DL (ref 65–140)
HCT VFR BLD AUTO: 30 % (ref 36.5–49.3)
HGB BLD-MCNC: 9.6 G/DL (ref 12–17)
MCH RBC QN AUTO: 28.7 PG (ref 26.8–34.3)
MCHC RBC AUTO-ENTMCNC: 32 G/DL (ref 31.4–37.4)
MCV RBC AUTO: 90 FL (ref 82–98)
P AXIS: 30 DEGREES
PLATELET # BLD AUTO: 267 THOUSANDS/UL (ref 149–390)
PMV BLD AUTO: 8.9 FL (ref 8.9–12.7)
POTASSIUM SERPL-SCNC: 3.9 MMOL/L (ref 3.5–5.3)
PR INTERVAL: 162 MS
QRS AXIS: 2 DEGREES
QRSD INTERVAL: 94 MS
QT INTERVAL: 392 MS
QTC INTERVAL: 404 MS
RBC # BLD AUTO: 3.35 MILLION/UL (ref 3.88–5.62)
SODIUM SERPL-SCNC: 136 MMOL/L (ref 136–145)
T WAVE AXIS: 26 DEGREES
VANCOMYCIN TROUGH SERPL-MCNC: 11.3 UG/ML (ref 10–20)
VENTRICULAR RATE: 64 BPM
WBC # BLD AUTO: 6.98 THOUSAND/UL (ref 4.31–10.16)

## 2019-09-09 PROCEDURE — 99255 IP/OBS CONSLTJ NEW/EST HI 80: CPT | Performed by: INTERNAL MEDICINE

## 2019-09-09 PROCEDURE — 80048 BASIC METABOLIC PNL TOTAL CA: CPT | Performed by: STUDENT IN AN ORGANIZED HEALTH CARE EDUCATION/TRAINING PROGRAM

## 2019-09-09 PROCEDURE — 80202 ASSAY OF VANCOMYCIN: CPT | Performed by: STUDENT IN AN ORGANIZED HEALTH CARE EDUCATION/TRAINING PROGRAM

## 2019-09-09 PROCEDURE — 82948 REAGENT STRIP/BLOOD GLUCOSE: CPT

## 2019-09-09 PROCEDURE — 99232 SBSQ HOSP IP/OBS MODERATE 35: CPT | Performed by: PHYSICIAN ASSISTANT

## 2019-09-09 PROCEDURE — 99024 POSTOP FOLLOW-UP VISIT: CPT | Performed by: PODIATRIST

## 2019-09-09 PROCEDURE — 93010 ELECTROCARDIOGRAM REPORT: CPT | Performed by: INTERNAL MEDICINE

## 2019-09-09 PROCEDURE — 85027 COMPLETE CBC AUTOMATED: CPT | Performed by: STUDENT IN AN ORGANIZED HEALTH CARE EDUCATION/TRAINING PROGRAM

## 2019-09-09 RX ORDER — IBUPROFEN 400 MG/1
800 TABLET ORAL EVERY 6 HOURS PRN
Status: DISCONTINUED | OUTPATIENT
Start: 2019-09-09 | End: 2019-09-11 | Stop reason: HOSPADM

## 2019-09-09 RX ORDER — INSULIN GLARGINE 100 [IU]/ML
28 INJECTION, SOLUTION SUBCUTANEOUS
Status: DISCONTINUED | OUTPATIENT
Start: 2019-09-09 | End: 2019-09-11 | Stop reason: HOSPADM

## 2019-09-09 RX ORDER — ACETAMINOPHEN 325 MG/1
650 TABLET ORAL EVERY 6 HOURS PRN
Status: DISCONTINUED | OUTPATIENT
Start: 2019-09-09 | End: 2019-09-11 | Stop reason: HOSPADM

## 2019-09-09 RX ADMIN — VANCOMYCIN HYDROCHLORIDE 1250 MG: 1 INJECTION, POWDER, LYOPHILIZED, FOR SOLUTION INTRAVENOUS at 11:56

## 2019-09-09 RX ADMIN — SODIUM CHLORIDE 125 ML/HR: 0.9 INJECTION, SOLUTION INTRAVENOUS at 03:49

## 2019-09-09 RX ADMIN — INSULIN LISPRO 4 UNITS: 100 INJECTION, SOLUTION INTRAVENOUS; SUBCUTANEOUS at 08:35

## 2019-09-09 RX ADMIN — INSULIN LISPRO 6 UNITS: 100 INJECTION, SOLUTION INTRAVENOUS; SUBCUTANEOUS at 11:55

## 2019-09-09 RX ADMIN — ENOXAPARIN SODIUM 40 MG: 40 INJECTION SUBCUTANEOUS at 09:44

## 2019-09-09 RX ADMIN — INSULIN GLARGINE 28 UNITS: 100 INJECTION, SOLUTION SUBCUTANEOUS at 21:39

## 2019-09-09 RX ADMIN — OXYCODONE HYDROCHLORIDE AND ACETAMINOPHEN 1 TABLET: 5; 325 TABLET ORAL at 07:12

## 2019-09-09 RX ADMIN — HYDROCHLOROTHIAZIDE: 25 TABLET ORAL at 09:44

## 2019-09-09 RX ADMIN — OXYCODONE HYDROCHLORIDE AND ACETAMINOPHEN 1 TABLET: 5; 325 TABLET ORAL at 15:54

## 2019-09-09 RX ADMIN — VANCOMYCIN HYDROCHLORIDE 1750 MG: 1 INJECTION, POWDER, LYOPHILIZED, FOR SOLUTION INTRAVENOUS at 21:39

## 2019-09-09 RX ADMIN — INSULIN LISPRO 2 UNITS: 100 INJECTION, SOLUTION INTRAVENOUS; SUBCUTANEOUS at 16:47

## 2019-09-09 NOTE — ASSESSMENT & PLAN NOTE
· Podiatry primary   · POD #2 S/p I&D right extremity with right partial 5th ray amputation with toe fillet flap on 9/8   · Wound care and dressing changes per podiatry   · Monitoring flap viability   · Infectious Disease following for abx   · Continue IV vancomycin- anticipating transition to oral antibiotics once final culutres results   · contineu to monitor hgb post operatively as dropped from 11 6  On admission to 9 6 --> stable at 9 8 today no active signs of bleeding

## 2019-09-09 NOTE — PROGRESS NOTES
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  - S/p Right bedside decompression (DOS: 9/7/19)  - S/p Right partial 5th ray amputation with toe fillet flap (DOS: 9/8/19)  2  Right foot cellulitis  3  Right plantar diabetic foot ulcer  4  Poorly controlled DM type 2 - HbA1c: 8 9%  5  Hypertension    Plan:  · Dressing change today to monitor flap viability  Currently flap is stable without any signs of necrosis  Capillary fill time is brisk  Will continue to closely monitor for flap viability  · C/w Vanco/Cefepime  ID following  · NWB RLE  · Appreciate SLIM for medical management    Subjective/Objective   Chief Complaint:   Chief Complaint   Patient presents with    Weakness - Generalized     patient reports dark yellow urine, diarrhea, foot and hand pain, right leg swelling, chronic wound on right leg  headache  Subjective: 63 y o  y/o male was seen and evaluated at bedside in no acute distress, nontoxic in appearance  Reports mild pain in the right foot  Patient denies any recent nausea, vomiting, fever, chills, shortness of breath, chest pains  Blood pressure 160/77, pulse 71, temperature (!) 96 9 °F (36 1 °C), temperature source Temporal, resp  rate 20, height 5' 7" (1 702 m), weight 90 7 kg (199 lb 15 3 oz), SpO2 96 %  ,Body mass index is 31 32 kg/m²  Invasive Devices     Peripheral Intravenous Line            Peripheral IV 09/09/19 Left Antecubital less than 1 day                Physical Exam:   General: Alert, cooperative and no distress  Lungs: Non labored breathing  Abdomen: Soft, non-tender  Extremity:     NVS at baseline B/l  MSK function at baseline B/l  No calf tenderness noted B/l     RLE:  Dressing was c/d/i with no strike through to the outer dressing noted  Mild sanguinous strike through noted to the inner dressings    Surgical site on the lateral foot is stable without any signs of necrosis  Flap capillary refill time is brisk  Mild maceration noted to the proximal aspect of the flap  Sutures are intact  Mild erythema, and edema present  Right foot      Lab, Imaging and other studies:   I have personally reviewed pertinent lab results  , CBC:   Lab Results   Component Value Date    WBC 6 98 09/09/2019    HGB 9 6 (L) 09/09/2019    HCT 30 0 (L) 09/09/2019    MCV 90 09/09/2019     09/09/2019    MCH 28 7 09/09/2019    MCHC 32 0 09/09/2019    RDW 12 7 09/09/2019    MPV 8 9 09/09/2019   , CMP:   Lab Results   Component Value Date    SODIUM 136 09/09/2019    K 3 9 09/09/2019     09/09/2019    CO2 25 09/09/2019    BUN 15 09/09/2019    CREATININE 0 97 09/09/2019    CALCIUM 8 4 09/09/2019    EGFR 86 09/09/2019       Imaging: I have personally reviewed pertinent films in PACS  EKG, Pathology, and Other Studies: I have personally reviewed pertinent reports  Portions of the record may have been created with voice recognition software  Occasional wrong word or "sound a like" substitutions may have occurred due to the inherent limitations of voice recognition software  Read the chart carefully and recognize, using context, where substitutions have occurred

## 2019-09-09 NOTE — ASSESSMENT & PLAN NOTE
· Podiatry primary   · POD #1 S/p I&D right extremity with right partial 5th ray amputation with toe fillet flap on 9/8   · Dressed changed by podiatry today   · Monitoring flap viability   · Infectious Disease following for abx   · Continue IV vancomycin- anticipating transition to oral antibiotics once final culutres results   · contineu to monitor hgb post operatively as dropped from 11 6  On admission to 9 6 today

## 2019-09-09 NOTE — ASSESSMENT & PLAN NOTE
· hgb 11 6 on admission dropped to 9 6 today post operatively   · Check CBC in AM and monitor for bleeding

## 2019-09-09 NOTE — PROGRESS NOTES
Progress Note Daniel Skaggs 1960, 63 y o  male MRN: [id number]  Unit/Bed#: E5 -01 Encounter: [id number]  Primary Care Provider: Annalise Steve DO   Date and time admitted to hospital: 9/7/2019  7:32 PM    * Gas gangrene [facility])  Assessment & Plan  · Podiatry primary   · POD #1 S/p I&D right extremity with right partial 5th ray amputation with toe fillet flap on 9/8   · Dressed changed by podiatry today   · Monitoring flap viability   · Infectious Disease following for abx   · Continue IV vancomycin- anticipating transition to oral antibiotics once final culutres results   · contineu to monitor hgb post operatively as dropped from 11 6  On admission to 9 6 today     Type 2 diabetes mellitus with diabetic polyneuropathy, without long-term current use of insulin [facility])  Assessment & Plan  Lab Results   Component Value Date    HGBA1C 8 9 (H) 09/07/2019       Recent Labs     09/08/19  1632 09/08/19  2056 09/09/19  0834 09/09/19  1120   POCGLU 164* 252* 235* 250*       Blood Sugar Average: Last 72 hrs:  (P) 241 [id number]   · Maintained on metformin only - held while here, very poorly controlled with A1c 8 9%   · Started on lantus 25 units qHs, still with hyperglycemia will increase lantus to 28 units, may need to add scheduled humalog , titrate as needed for euglycemia   ·  continue SSI, diabetic diet   · Tight glucose control     Essential hypertension  Assessment & Plan  · Maintained on losartan- HCTZ   · Continue home medication regimen     Anemia  Assessment & Plan  · hgb 11 6 on admission dropped to 9 6 today post operatively   · Check CBC in AM and monitor for bleeding     VTE Pharmacologic Prophylaxis:   Pharmacologic: Enoxaparin (Lovenox)  Mechanical VTE Prophylaxis in Place: No    Patient Centered Rounds: I have performed bedside rounds with nursing staff today      Discussions with Specialists or Other Care Team Provider:     Education and Discussions with Family / Patient: patient     Time Spent for Care: 20 minutes  More than 50% of total time spent on counseling and coordination of care as described above  Current Length of Stay: 2 day(s)    Current Patient Status: Inpatient   Certification Statement: The patient will continue to require additional inpatient hospital stay due to gas gangrene     Discharge Plan: per podiatry     Code Status: Level 1 - Full Code      Subjective:   Doing well today  No acute complaints  Pain controlled  Objective:     Vitals:   Temp (24hrs), Av 4 °F (36 3 °C), Min:96 9 °F (36 1 °C), Max:98 °F (36 7 °C)    Temp:  [96 9 °F (36 1 °C)-98 °F (36 7 °C)] 96 9 °F (36 1 °C)  HR:  [70-79] 71  Resp:  [16-20] 20  BP: (121-160)/(58-79) 160/77  SpO2:  [95 %-98 %] 96 %  Body mass index is 31 32 kg/m²  Input and Output Summary (last 24 hours): Intake/Output Summary (Last 24 hours) at 2019 1531  Last data filed at 2019 1024  Gross per 24 hour   Intake 2062 92 ml   Output    Net 2062 92 ml       Physical Exam:     Physical Exam   Constitutional: No distress  HENT:   Head: Normocephalic and atraumatic  Eyes: Conjunctivae are normal    Neck: Neck supple  Cardiovascular: Normal rate, regular rhythm and normal heart sounds  Pulmonary/Chest: Effort normal and breath sounds normal    Abdominal: Soft  Musculoskeletal: He exhibits deformity  Tenderness: right dressing present  Neurological:   Wiggles right toes    Skin: Skin is warm  He is not diaphoretic  Psychiatric: He has a normal mood and affect  Nursing note and vitals reviewed  Additional Data:     Labs:    Results from last 7 days   Lab Units 19  0530  19  2045   WBC Thousand/uL 6 98   < > 15 24*   HEMOGLOBIN g/dL 9 6*   < > 11 6*   HEMATOCRIT % 30 0*   < > 34 4*   PLATELETS Thousands/uL 267   < > 315   NEUTROS PCT %  --   --  78*   LYMPHS PCT %  --   --  10*   MONOS PCT %  --   --  11   EOS PCT %  --   --  0    < > = values in this interval not displayed       Results from last 7 days   Lab Units 09/09/19  0530   SODIUM mmol/L 136   POTASSIUM mmol/L 3 9   CHLORIDE mmol/L 105   CO2 mmol/L 25   BUN mg/dL 15   CREATININE mg/dL 0 97   ANION GAP mmol/L 6   CALCIUM mg/dL 8 4   GLUCOSE RANDOM mg/dL 174*         Results from last 7 days   Lab Units 09/09/19  1120 09/09/19  0834 09/08/19  2056 09/08/19  1632 09/08/19  1352 09/08/19  1059 09/08/19  0743 09/08/19  0611 09/07/19  2349   POC GLUCOSE mg/dl 250* 235* 252* 164* 185* 224* 255* 296* 310*     Results from last 7 days   Lab Units 09/07/19  2045   HEMOGLOBIN A1C % 8 9*               * I Have Reviewed All Lab Data Listed Above  * Additional Pertinent Lab Tests Reviewed: All Labs Within Last 24 Hours Reviewed    Imaging:    Imaging Reports Reviewed Today Include: reviewed  Imaging Personally Reviewed by Myself Includes:  none    Recent Cultures (last 7 days):     Results from last 7 days   Lab Units 09/08/19  1256 09/07/19  2236 09/07/19  2228 09/07/19  1951   BLOOD CULTURE   --  No Growth at 24 hrs   --  No Growth at 24 hrs     GRAM STAIN RESULT  No polys seen*  2+ Gram positive cocci in pairs*  --  1+ Polys*  2+ Gram positive cocci in pairs*  --    WOUND CULTURE  Culture too young- will reincubate  --  4+ Growth of Beta Hemolytic Streptococcus Group B*  2+ Growth of Staphylococcus aureus*  --        Last 24 Hours Medication List:     Current Facility-Administered Medications:  acetaminophen 650 mg Oral Q6H PRN Orvan Bathe, DPM   enoxaparin 40 mg Subcutaneous Daily Orvan Bathe, DPM   ibuprofen 800 mg Oral Q6H PRN Orvan Bathe, DPM   insulin glargine 28 Units Subcutaneous HS Day De Jesus PA-C   insulin lispro 1-5 Units Subcutaneous HS Elgin Shook DPM   insulin lispro 2-12 Units Subcutaneous TID AC Elgin Shook DPM   losartan potassium-hydrochlorothiazide (HYZAAR 100/25) combo dose  Oral Daily Orvan Bathe, DPM   oxyCODONE-acetaminophen 1 tablet Oral Q6H PRN Orvan Bathe, DPM   oxyCODONE-acetaminophen 1 tablet Oral Q4H PRN Byron Fernando, DPM   pravastatin 40 mg Oral Daily With [employer], DPM   vancomycin 20 mg/kg Intravenous Q12H Byron Fernando, MONO        Today, Patient Was Seen By: Gretchen Lima PA-C    ** Please Note: Dictation voice to text software may have been used in the creation of this document   **

## 2019-09-09 NOTE — ASSESSMENT & PLAN NOTE
· Maintained on losartan- HCTZ   · Elevated BP this morning  · Please check manual BP as patient states BP were all different with multiple attempts with machine   · Will add IV hydralazine as needed   · Continue home medication regimen

## 2019-09-09 NOTE — ASSESSMENT & PLAN NOTE
Lab Results   Component Value Date    HGBA1C 8 9 (H) 09/07/2019       Recent Labs     09/08/19  1632 09/08/19  2056 09/09/19  0834 09/09/19  1120   POCGLU 164* 252* 235* 250*       Blood Sugar Average: Last 72 hrs:  (P) 241 [id number]   · Maintained on metformin only - held while here, very poorly controlled with A1c 8 9%   · Started on lantus 25 units qHs, still with hyperglycemia will increase lantus to 28 units, may need to add scheduled humalog , titrate as needed for euglycemia   ·  continue SSI, diabetic diet   · Tight glucose control

## 2019-09-09 NOTE — CONSULTS
Consultation - Infectious Disease   Hilario Marin 63 y o  male MRN: [id number]  Unit/Bed#: E5 -01 Encounter: [id number]    IMPRESSION & RECOMMENDATIONS:   1  Sepsis  POA  Tachycardia and leukocytosis  Secondary to right foot gas gangrene and cellulitis  No other clear source appreciated  Blood cultures are negative after 24 hours  His chest x-ray was negative for acute infectious cardiopulmonary findings  Patient is now status post wound debridement as well as R 5th partial ray amp and toe fillet flap  All wound cultures are preliminarily showing gram-positive cocci  Fortunately, the patient has remained clinically stable and nontoxic  Today he is afebrile and his WBC count has trended down   -antibiotic as below  -monitor CBC and BMP  -follow up blood cultures  -follow up wound culture  -follow up intraoperative cultures  -monitor vitals  -supportive care    2  Right foot cellulitis  With gas gangrene  Secondary to a chronic right plantar 5th toe diabetic ulcer  Patient is status post bedside debridement  He then went to the OR on 09/08/2019 for a right partial 5th ray amputation and toe fillet flap  Wound culture and intraoperative culture are both preliminarily showing gram-positive cocci  Blood cultures are negative after 24 hours  He does not have a history of MRSA or implanted hardware  The patient has been receiving IV cefepime and IV vancomycin  Given his updated culture results I recommend stopping the cefepime and continuing him on vancomycin for now  Will hopefully deescalate further once final culture results are received  Anticipate a one week course of postop antibiotics    -stop IV cefepime  -continue IV vancomycin  -continue pharmacy consult for guidance on vancomycin dosage  -anticipate rapid transition to oral antibiotic regimen once final culture results are received  -anticipate one week postop antibiotics through 09/15/2019  -monitor CBC and BMP  -follow up wound culture  -follow up intraoperative culture  -monitor vitals  -serial right foot exams    3  Chronic right plantar 5th toe diabetic ulcer  Patient has been going for routine follow up at the outpatient wound care center  However, he recently noticed blistering to the wound  He is now status post bedside debridement  He went for a right partial 5th ray amp and toe fillet flap on 09/08/2019  Cultures are preliminarily showing GPC  Wound is surgically cured  Flap stable on podiatry exam this morning  He continues to follow closely with Podiatry   -antibiotic as above  -anticipate one week postop antibiotics through 09/15/2019  -follow up wound culture  -follow up intraoperative culture  -serial right foot exams  -local wound care/flat management per Podiatry  -continue close follow-up with Podiatry    4  Uncontrolled type 2 diabetes mellitus with neuropathy  Patient's last hemoglobin A1c was 8 9% on 09/07/2019  Likely contributor to his infection above  Recommend patient committed to tighter glycemic control for overall health, especially in the setting of wound infection   -blood glucose management per primary service    Thank you for the consult  We will continue to follow along  Above plan was discussed in detail with patient and his family at the bedside  HISTORY OF PRESENT ILLNESS:  Reason for Consult:  Gas gangrene, cellulitis of the right lower extremity, type 2 diabetes mellitus with polyneuropathy  HPI: Lilly Barton is a 63y o  year old male who presented to the [facility] Emergency Department on 09/07/2019 with concerns about an infection in his right foot  Patient reported that he had a chronic right foot 5th toe wound for which he goes for routine outpatient debridement at the [facility]  Patient stated that his foot had become swollen, red, and painful  Stated at home he had a fever of 102  He had been experiencing weakness and fatigue      Upon arrival to the ED the patient's temperature was 98 7° with a heart rate of 101  His WBC count was 15 24  His blood glucose was 220  Patient's creatinine was 1 1 with a GFR of 74  Blood cultures were sent  He completed a urinalysis which was abnormal but did not show signs of acute infection  He was taken for chest x-ray which was negative  The patient  was taken for an x-ray of the right ankle and foot which was concerning for soft tissue swelling and emphysematous and cellulitic changes surrounding the 5th toe  Podiatry came to the bedside and performed debridement  A wound culture was sent  The patient was started on IV cefepime and vancomycin  He was admitted for additional medical management  After his admission the patient was taken for a CT of the right foot which was concerning for a fluid collection near his right 5th toe tendon, and ulceration of the right 5th toe with a sinus tract the dorsum foot, and likely showed peroneal tenosynovitis  There was no osseous abnormalities indicating osteomyelitis  He was kept on cefepime and vancomycin as antibiotic regimen  The patient was taken to the OR on 09/08/2019 and he underwent a partial ray amputation and toe fillet flap  Both his bedside culture and intraoperative cultures are preliminarily showing gram-positive cocci  Fortunately his blood cultures are negative after 24 hours  We have been asked for formal consult for gas gangrene, cellulitis of the right lower extremity, and type 2 diabetes mellitus with polyneuropathy  Patient's past medical history is significant for type 2 diabetes mellitus, hypertension, diabetic foot ulcers, neuropathy, and hyperglycemia  Concern for alcoholism as he admits to multiple beers per day  He has no known drug allergies  REVIEW OF SYSTEMS:  Patient reports that at the moment he is feeling a little better  States after surgery yesterday he had a lot of pain  He reports the pain continued to be intense all night  States this morning he had more pain when podiatry changed his dressing  However, since then he has noticed his foot is feeling a bit better  Reports his current dressing is intact  Other than pain in the foot he denies acute complaints  No fever, chills, shakes, sweats, cough, shortness of breath, chest pain, headache, dizziness  He denies nausea, vomiting, diarrhea, abdominal pain, dysuria  A complete 12 point system-based review of systems is otherwise negative  PAST MEDICAL HISTORY:  Past Medical History:   Diagnosis Date    Diabetes mellitus ([address] )     Hypertension      Past Surgical History:   Procedure Laterality Date    INCISION AND DRAINAGE OF WOUND Right 2019    Procedure: INCISION AND DRAINAGE (I&D) EXTREMITY;  Surgeon: Davion Forrest DPM;  Location: [facility];  Service: Podiatry     FAMILY HISTORY:  Non-contributory    SOCIAL HISTORY:  Social History   Social History     Substance and Sexual Activity   Alcohol Use Yes    Alcohol/week: 30 0 standard drinks    Types: 30 Cans of beer per week    Comment: 5-6 beers 4-5 x per week      Social History     Substance and Sexual Activity   Drug Use Never     Social History     Tobacco Use   Smoking Status Never Smoker   Smokeless Tobacco Never Used     ALLERGIES:  No Known Allergies    MEDICATIONS:  All current active medications have been reviewed      ANTIBIOTICS:  Cefepime - stop  Vancomycin 3  Antibiotics 3  Post op 1    PHYSICAL EXAM:  Temp:  [96 9 °F (36 1 °C)-98 9 °F (37 2 °C)] 96 9 °F (36 1 °C)  HR:  [70-79] 79  Resp:  [12-20] 20  BP: (117-156)/(58-80) 121/58  SpO2:  [94 %-98 %] 96 %  Temp (24hrs), Av 8 °F (36 6 °C), Min:96 9 °F (36 1 °C), Max:98 9 °F (37 2 °C)  Current: Temperature: (!) 96 9 °F (36 1 °C)    Intake/Output Summary (Last 24 hours) at 2019 0851  Last data filed at 2019 3110  Gross per 24 hour   Intake 3090 ml   Output 50 ml   Net 3040 ml     General Appearance:  Appearing well, nontoxic, and in no distress Head:  Normocephalic, without obvious abnormality, atraumatic   Eyes:  Conjunctiva pink and sclera anicteric, both eyes; is wearing his glasses   Nose: Nares normal, mucosa normal, no drainage   Throat: Oropharynx moist without lesions   Neck: Supple, symmetrical, no adenopathy, no tenderness/mass/nodules   Back:   Symmetric, ROM normal, no CVA tenderness   Lungs:   Clear to auscultation bilaterally, respirations unlabored   Chest Wall:  No tenderness or deformity   Heart:  RRR; no murmur, rub or gallop   Abdomen:   Soft, non-tender, non-distended, positive bowel sounds throughout   Extremities: No cyanosis or clubbing; bulky dressing intact to the right foot, no breakthrough drainage   Skin: No rashes or lesions  Warm and dry  Lymph nodes: Cervical, supraclavicular nodes normal   Neurologic: Alert and oriented times 3, follows commands, equal hand grasps, symmetric facial movement     LABS, IMAGING, & OTHER STUDIES:  Lab Results:  I have personally reviewed pertinent labs  Results from last 7 days   Lab Units 09/09/19  0530 09/08/19  0614 09/07/19  2045   WBC Thousand/uL 6 98 9 52 15 24*   HEMOGLOBIN g/dL 9 6* 11 0* 11 6*   PLATELETS Thousands/uL 267 260 315     Results from last 7 days   Lab Units 09/09/19  0530   POTASSIUM mmol/L 3 9   CHLORIDE mmol/L 105   CO2 mmol/L 25   BUN mg/dL 15   CREATININE mg/dL 0 97   EGFR ml/min/1 73sq m 86   CALCIUM mg/dL 8 4     Results from last 7 days   Lab Units 09/08/19  1256 09/07/19  2236 09/07/19  2228   BLOOD CULTURE   --  No Growth at 24 hrs   --    GRAM STAIN RESULT  No polys seen*  2+ Gram positive cocci in pairs*  --  1+ Polys*  2+ Gram positive cocci in pairs*     Imaging Studies:   I have personally reviewed pertinent imaging study reports and images in PACS  XR FOOT RIGHT 3+ VIEWS 9/9/19 - I personally reviewed this study which showed stable postop changes of the right 5th digit      XR CHEST PORTABLE 9/8/19 -I personally reviewed this study which showed no acute infectious cardiopulmonary findings  CT FOOT RIGHT W CONTRAST 9/8/19 - I personally reviewed this study which was concerning for a fluid collection near patient's 5th toe tendon, and ulceration of the right 5th toe with a sinus tract was the dorsal foot, and likely per Vincent ill tenosynovitis  There was no concerning findings for osteomyelitis  XR FOOT 3+ VIEWS RIGHT 9/8/19 - I personally reviewed this study which was concerning for subcutaneous emphysema surrounding the right 5th toe  There was no concerning findings for osteomyelitis  XR ANKLE 3+ VIEWS RIGHT 9/8/19 - I personally reviewed this study which was suggestive of soft tissue emphysema surrounding the right 5th toe  He also had by malleolar swelling  No osseous abnormalities were noted and there were no concerning findings for osteomyelitis

## 2019-09-09 NOTE — ASSESSMENT & PLAN NOTE
Lab Results   Component Value Date    HGBA1C 8 9 (H) 09/07/2019       Recent Labs     09/08/19  1632 09/08/19  2056 09/09/19  0834 09/09/19  1120   POCGLU 164* 252* 235* 250*       Blood Sugar Average: Last 72 hrs:  (P) 241 [id number]   · Maintained on metformin only - held while here, very poorly controlled with A1c 8 9%   · Started on lantus 25 units qHs, still with hyperglycemia yesterday increased lantus to 28 units qHS will monitor increase in lantus for next 24 hours and adjust as needed   ·  continue SSI, diabetic diet   · Tight glucose control

## 2019-09-09 NOTE — PROGRESS NOTES
Vancomycin Assessment    Olamide Oliva is a 63 y o  male who is currently receiving vancomycin 1250mg q 12h for skin-soft tissue infection     Relevant clinical data and objective history reviewed:  Creatinine   Date Value Ref Range Status   09/09/2019 0 97 0 60 - 1 30 mg/dL Final     Comment:     Standardized to IDMS reference method   09/08/2019 0 95 0 60 - 1 30 mg/dL Final     Comment:     Standardized to IDMS reference method   09/07/2019 1 10 0 60 - 1 30 mg/dL Final     Comment:     Standardized to IDMS reference method     /77 (BP Location: Right arm)   Pulse 71   Temp (!) 96 9 °F (36 1 °C) (Temporal)   Resp 20   Ht 5' 7" (1 702 m)   Wt 90 7 kg (199 lb 15 3 oz)   SpO2 96%   BMI 31 32 kg/m²   I/O last 3 completed shifts: In: 4275 [I V :2600; IV Piggyback:1250]  Out: 50 [Blood:50]  Lab Results   Component Value Date/Time    BUN 15 09/09/2019 05:30 AM    WBC 6 98 09/09/2019 05:30 AM    HGB 9 6 (L) 09/09/2019 05:30 AM    HCT 30 0 (L) 09/09/2019 05:30 AM    MCV 90 09/09/2019 05:30 AM     09/09/2019 05:30 AM     Temp Readings from Last 3 Encounters:   09/09/19 (!) 96 9 °F (36 1 °C) (Temporal)   07/29/19 98 5 °F (36 9 °C) (Temporal)     Vancomycin Days of Therapy: 3    Assessment/Plan  The patient is currently on vancomycin utilizing scheduled dosing  The patient is receiving 1250mg q 12h with the most recent vancomycin level being  11 3 at steady-state and sub-therapeutic based on a goal of 15-20 (appropriate for most indications) ; therefore, after clinical evaluation will be changed to 1750mg q12h   Pharmacy will continue to follow closely for s/sx of nephrotoxicity, infusion reactions and appropriateness of therapy  BMP and CBC will be ordered per protocol  Plan for trough as patient approaches steady state, prior to the 4th  dose at approximately 830 am on 9-11-19  Pharmacy will continue to follow the patients culture results and clinical progress daily      Bobby Sharma, Pharmacist

## 2019-09-10 LAB
ANION GAP SERPL CALCULATED.3IONS-SCNC: 10 MMOL/L (ref 4–13)
BACTERIA SPEC ANAEROBE CULT: NORMAL
BACTERIA SPEC ANAEROBE CULT: NORMAL
BUN SERPL-MCNC: 14 MG/DL (ref 5–25)
CALCIUM SERPL-MCNC: 8.5 MG/DL (ref 8.3–10.1)
CHLORIDE SERPL-SCNC: 107 MMOL/L (ref 100–108)
CO2 SERPL-SCNC: 25 MMOL/L (ref 21–32)
CREAT SERPL-MCNC: 0.88 MG/DL (ref 0.6–1.3)
ERYTHROCYTE [DISTWIDTH] IN BLOOD BY AUTOMATED COUNT: 12.7 % (ref 11.6–15.1)
GFR SERPL CREATININE-BSD FRML MDRD: 95 ML/MIN/1.73SQ M
GLUCOSE SERPL-MCNC: 109 MG/DL (ref 65–140)
GLUCOSE SERPL-MCNC: 160 MG/DL (ref 65–140)
GLUCOSE SERPL-MCNC: 168 MG/DL (ref 65–140)
GLUCOSE SERPL-MCNC: 178 MG/DL (ref 65–140)
GLUCOSE SERPL-MCNC: 190 MG/DL (ref 65–140)
HCT VFR BLD AUTO: 30.9 % (ref 36.5–49.3)
HGB BLD-MCNC: 9.8 G/DL (ref 12–17)
MCH RBC QN AUTO: 28 PG (ref 26.8–34.3)
MCHC RBC AUTO-ENTMCNC: 31.7 G/DL (ref 31.4–37.4)
MCV RBC AUTO: 88 FL (ref 82–98)
PLATELET # BLD AUTO: 308 THOUSANDS/UL (ref 149–390)
PMV BLD AUTO: 8.9 FL (ref 8.9–12.7)
POTASSIUM SERPL-SCNC: 3.4 MMOL/L (ref 3.5–5.3)
RBC # BLD AUTO: 3.5 MILLION/UL (ref 3.88–5.62)
SODIUM SERPL-SCNC: 142 MMOL/L (ref 136–145)
WBC # BLD AUTO: 6.27 THOUSAND/UL (ref 4.31–10.16)

## 2019-09-10 PROCEDURE — G8978 MOBILITY CURRENT STATUS: HCPCS

## 2019-09-10 PROCEDURE — 99024 POSTOP FOLLOW-UP VISIT: CPT | Performed by: PODIATRIST

## 2019-09-10 PROCEDURE — 82948 REAGENT STRIP/BLOOD GLUCOSE: CPT

## 2019-09-10 PROCEDURE — G8980 MOBILITY D/C STATUS: HCPCS

## 2019-09-10 PROCEDURE — 85027 COMPLETE CBC AUTOMATED: CPT | Performed by: STUDENT IN AN ORGANIZED HEALTH CARE EDUCATION/TRAINING PROGRAM

## 2019-09-10 PROCEDURE — 99232 SBSQ HOSP IP/OBS MODERATE 35: CPT | Performed by: PHYSICIAN ASSISTANT

## 2019-09-10 PROCEDURE — 99233 SBSQ HOSP IP/OBS HIGH 50: CPT | Performed by: INTERNAL MEDICINE

## 2019-09-10 PROCEDURE — 97163 PT EVAL HIGH COMPLEX 45 MIN: CPT

## 2019-09-10 PROCEDURE — G8979 MOBILITY GOAL STATUS: HCPCS

## 2019-09-10 PROCEDURE — 80048 BASIC METABOLIC PNL TOTAL CA: CPT | Performed by: STUDENT IN AN ORGANIZED HEALTH CARE EDUCATION/TRAINING PROGRAM

## 2019-09-10 RX ORDER — HYDRALAZINE HYDROCHLORIDE 20 MG/ML
5 INJECTION INTRAMUSCULAR; INTRAVENOUS EVERY 6 HOURS PRN
Status: DISCONTINUED | OUTPATIENT
Start: 2019-09-10 | End: 2019-09-11 | Stop reason: HOSPADM

## 2019-09-10 RX ORDER — POTASSIUM CHLORIDE 20 MEQ/1
20 TABLET, EXTENDED RELEASE ORAL ONCE
Status: COMPLETED | OUTPATIENT
Start: 2019-09-10 | End: 2019-09-10

## 2019-09-10 RX ORDER — HYDRALAZINE HYDROCHLORIDE 20 MG/ML
5 INJECTION INTRAMUSCULAR; INTRAVENOUS EVERY 6 HOURS PRN
Status: DISCONTINUED | OUTPATIENT
Start: 2019-09-10 | End: 2019-09-10

## 2019-09-10 RX ORDER — INSULIN GLARGINE 100 [IU]/ML
28 INJECTION, SOLUTION SUBCUTANEOUS
Qty: 840 UNITS | Refills: 0 | Status: SHIPPED | OUTPATIENT
Start: 2019-09-10 | End: 2019-10-10

## 2019-09-10 RX ORDER — CEFAZOLIN SODIUM 2 G/50ML
2000 SOLUTION INTRAVENOUS EVERY 8 HOURS
Status: DISCONTINUED | OUTPATIENT
Start: 2019-09-10 | End: 2019-09-11

## 2019-09-10 RX ADMIN — INSULIN LISPRO 2 UNITS: 100 INJECTION, SOLUTION INTRAVENOUS; SUBCUTANEOUS at 16:33

## 2019-09-10 RX ADMIN — CEFAZOLIN SODIUM 2000 MG: 2 SOLUTION INTRAVENOUS at 11:45

## 2019-09-10 RX ADMIN — INSULIN LISPRO 2 UNITS: 100 INJECTION, SOLUTION INTRAVENOUS; SUBCUTANEOUS at 08:33

## 2019-09-10 RX ADMIN — INSULIN GLARGINE 28 UNITS: 100 INJECTION, SOLUTION SUBCUTANEOUS at 22:07

## 2019-09-10 RX ADMIN — POTASSIUM CHLORIDE 20 MEQ: 1500 TABLET, EXTENDED RELEASE ORAL at 11:45

## 2019-09-10 RX ADMIN — INSULIN LISPRO 1 UNITS: 100 INJECTION, SOLUTION INTRAVENOUS; SUBCUTANEOUS at 22:11

## 2019-09-10 RX ADMIN — CEFAZOLIN SODIUM 2000 MG: 2 SOLUTION INTRAVENOUS at 20:39

## 2019-09-10 RX ADMIN — VANCOMYCIN HYDROCHLORIDE 1750 MG: 1 INJECTION, POWDER, LYOPHILIZED, FOR SOLUTION INTRAVENOUS at 09:06

## 2019-09-10 RX ADMIN — OXYCODONE HYDROCHLORIDE AND ACETAMINOPHEN 1 TABLET: 5; 325 TABLET ORAL at 11:55

## 2019-09-10 RX ADMIN — ENOXAPARIN SODIUM 40 MG: 40 INJECTION SUBCUTANEOUS at 08:34

## 2019-09-10 RX ADMIN — INSULIN LISPRO 2 UNITS: 100 INJECTION, SOLUTION INTRAVENOUS; SUBCUTANEOUS at 11:45

## 2019-09-10 RX ADMIN — IBUPROFEN 800 MG: 400 TABLET ORAL at 21:03

## 2019-09-10 RX ADMIN — HYDROCHLOROTHIAZIDE: 25 TABLET ORAL at 08:34

## 2019-09-10 NOTE — ASSESSMENT & PLAN NOTE
· Podiatry primary   · POD #3 S/p I&D right extremity with right partial 5th ray amputation with toe fillet flap on 9/8   · Wound care and dressing changes per podiatry   · Monitoring flap viability   · Infectious Disease following for abx   · Transitioned to po Keflex   · hgb improving and stable   · Okay for discharge from IM standpoint will sign off please call with questions (see below for insulin plan, scripts in chart)

## 2019-09-10 NOTE — PHYSICAL THERAPY NOTE
PT EVALUATION    Pt  Name: Desi Garcia  Pt  Age: 63 y o  MRN: [id number]  LENGTH OF STAY: 3    Patient Active Problem List   Diagnosis    Gas gangrene ([address] )    Cellulitis of right lower extremity    Type 2 diabetes mellitus with diabetic polyneuropathy, without long-term current use of insulin ([address] )    Essential hypertension    Anemia       Admitting Diagnoses:   Gas gangrene ([address] ) [A48 0]  Essential hypertension [I10]  Fever [R50 9]  Cellulitis of right lower extremity [L03 115]  Type 2 diabetes mellitus with diabetic polyneuropathy, with long-term current use of insulin ([address] ) [E11 42, Z79 4]    Past Medical History:   Diagnosis Date    Diabetes mellitus ([address] )     Hypertension        Past Surgical History:   Procedure Laterality Date    INCISION AND DRAINAGE OF WOUND Right 9/8/2019    Procedure: INCISION AND DRAINAGE (I&D) EXTREMITY;  Surgeon: Saira Burrows DPM;  Location: [facility];  Service: Podiatry       Imaging Studies:  XR foot right 3+ views   Final Result by Kelly Godinez MD (09/09 2548)      Postsurgical changes of the 5th digit without evidence of acute complication  Workstation performed: [id number]         XR chest portable   Final Result by Tracie Jeffries MD (09/08 5846)      No active pulmonary disease  Workstation performed: [id number]         CT foot right w contrast   Final Result by Mihai Kothari MD (09/08 6723)   There is a fluid collection which measures 3 cm x 1 8 x 1 5 cm at the dorsal lateral aspect of the midfoot, in close proximity to the extensor digitorum longus predominantly in relation to the tendon slip of the 5th toe   This is seen in image 20 of series 402 and image 60 of series 3         There are no erosive changes at the level of the 5th metatarsophalangeal joint or in the 5th toe to suggest osteomyelitis        There is an ulceration at the plantar aspect of the 5th metatarsophalangeal joint with a sinus tract which extends into the dorsal aspect with foci of air and packing from recent debridement  Small foci of air are also noted in relation the 5th middle    phalanx      The peroneal tendons appear enlarged and prominent, peroneal tenosynovitis is not excluded      If concern for osteomyelitis persist MRI can be considered      The study was marked in EPIC for immediate notification  Workstation performed: [id number]         XR foot 3+ views RIGHT   Final Result by Jayme White MD (09/08 1023)      5th toe cellulitis with subcutaneous emphysema  No radiographic evidence for osteomyelitis  The study was marked in EPIC for significant notification  Workstation performed: [id number]         XR ankle 3+ views RIGHT   Final Result by Karla Mares MD (09/08 1051)      No acute osseous abnormality  Bimalleolar soft tissue swelling  Soft tissue emphysema surrounding the left 5th metatarsophalangeal joint  Correlate for soft tissue infection  Workstation performed: [id number]              09/10/19 1325   Note Type   Note type Eval only   Pain Assessment   Pain Score No Pain   Home Living   Type of [address] One level   Bathroom Equipment Other (Comment)  (no DME)   Home Equipment Other (Comment)  (no DME)   Prior Function   Level of [place] Independent with ADLs and functional mobility  (w/o AD)   Lives With Spouse   Receives Help From Family   ADL Assistance Independent   Falls in the last 6 months 0   Vocational Full time employment   Restrictions/Precautions   Weight Bearing Precautions Per Order Yes   RLE Weight Bearing Per Order NWB   Other Precautions WBS; Fall Risk;Multiple lines   General   Family/Caregiver Present No   Cognition   Overall Cognitive Status WFL   Arousal/Participation Alert   Orientation Level Oriented X4   Following Commands Follows all commands and directions without difficulty   RUE Assessment   RUE Assessment WNL   LUE Assessment   LUE Assessment WNL   RLE Assessment   RLE Assessment   (4+/5 grossly except R foot not tested)   LLE Assessment   LLE Assessment WNL   Coordination   Movements are Fluid and Coordinated 1   Sensation X  (occasional numbness & tingling to fingers & feet)   Bed Mobility   Supine to Sit 7  Independent   Sit to Supine 7  Independent   Transfers   Sit to Stand 6  Modified independent   Additional items Verbal cues   Stand to Sit 6  Modified independent   Additional items Verbal cues   Additional Comments pt able to maintain NWB to R foot 100% of the time   Ambulation/Elevation   Gait pattern Excessively slow; Step to  (hopping pattern; pt able to maintain NWB to R foot 100% )   Gait Assistance 5  Supervision   Additional items Verbal cues   Assistive Device Rolling walker   Distance 40'x1   Balance   Static Sitting Normal   Static Standing Good  (w/ RW)   Ambulatory Fair +  (w/ RW)   Activity Tolerance   Activity Tolerance Patient tolerated treatment well   Nurse Made Aware STEPHANY Marin   Assessment   Prognosis Good   Problem List Impaired balance;Decreased mobility; Decreased endurance;Obesity;Pain;Orthopedic restrictions   Assessment Pt 63 y o  male presented w/ R foot swelling & redness  Pt admitted for Gas gangrene ([facility] [address] ) R 5th toe w/ cellulitis  S/p partial 5th ray amputation on 9/8/19  PTA, pt reports being I w/o AD  Pt lives w/ his wife in 1 story house  Pt referred to PT for mobility assessment & D/C planning  On eval, pt demonstrates no significant decline in function to warrant skilled PT at this time  Pt  I/modified w/ bed mobility & transfers; S for amb w/ RW + initial cues for techniques & safety  Pt able to maintain NWB to R foot 100% of the time  Gait slow w/ step-to hopping pattern  No gross LOB noted  Pt states no concerns about going back home when medically cleared  Will D/C PT  Will recommend continued of RW for amb  Pt may return home w/ family support when medically cleared  Please advise PT when needs change   Nsg notified      Barriers to Discharge None   Goals   Patient Goals to go home   Treatment Day 0   Plan   Treatment/Interventions Spoke to nursing;OT  (PT eval only)   PT Frequency Other (Comment)  (D/C PT)   Recommendation   Recommendation Home with family support;D/C PT   Equipment Recommended Walker  (RW)   PT - OK to Discharge Yes  (when medically cleared)   Barthel Index   Feeding 10   Bathing 5   Grooming Score 5   Dressing Score 10   Bladder Score 10   Bowels Score 10   Toilet Use Score 10   Transfers (Bed/Chair) Score 15   Mobility (Level Surface) Score 0   Stairs Score 0   Barthel Index Score 75   Hx/personal factors: co-morbidities, mutliple lines, use of AD, pain, WB restrictions and fall risk  Examination: dec balance, dec endurance, dec amb, low fall risk, pain, WB restrictions  Clinical: unpredictable (ongoing medical status, abnormal lab values and low fall risk)  Complexity: high     Luan Adriana, PT

## 2019-09-10 NOTE — PROGRESS NOTES
Progress Note - Podiatry  Deyvi Arreaga 63 y o  male MRN: [id number]  Unit/Bed#: E5 -01 Encounter: [id number]    Assessment/Plan:  64yo male with DM2 with neuropathy and right foot DFU and gas gangrene s/p bedside decompression (9/7/19) and parital 5th ray amputation with toe fillet flap (9/8/19)    1) Right foot cellulitis with gas gangrene now s/p partial 5th ray amp  - residual erythema present on dorsal foot   - incision site is well approximated with sutures intact, fillet flap cap refill is brisk  2) DM2 with peripheral neuropathy - last Hgb A1c 8 9%  3) HTN    Plan:  - continue with IV vanc per ID, await final susceptibility, then psb transition to oral abx for dispo  - NWB to RLE, PT/OT to eval  - medical mgmt per SLIM  - dispo planning    Subjective/Objective   Chief Complaint:   Chief Complaint   Patient presents with    Weakness - Generalized     patient reports dark yellow urine, diarrhea, foot and hand pain, right leg swelling, chronic wound on right leg  headache  Subjective: 63 y o  y/o male was seen and evaluated at bedside  No acute events overnight  Blood pressure (!) 180/94, pulse 78, temperature 97 5 °F (36 4 °C), temperature source Tympanic, resp  rate 18, height 5' 7" (1 702 m), weight 90 7 kg (199 lb 15 3 oz), SpO2 96 %  ,Body mass index is 31 32 kg/m²      Invasive Devices     Peripheral Intravenous Line            Peripheral IV 09/09/19 Left Antecubital 1 day                Physical Exam:   General: Alert, cooperative and no distress  Lungs: Non labored breathing  Heart: Regular rhythm and rate  Abdomen: non-tender, non-distended  Lower Extremities: right foot surgical site is well approximated with sutures intact, cap refill time at fillet flap is brisk, no active drainage, residual erythema present on dorsal foot              Lab, Imaging and other studies:   CBC:   Lab Results   Component Value Date    WBC 6 27 09/10/2019    HGB 9 8 (L) 09/10/2019    HCT 30 9 (L) 09/10/2019    MCV 88 09/10/2019     09/10/2019    MCH 28 0 09/10/2019    MCHC 31 7 09/10/2019    RDW 12 7 09/10/2019    MPV 8 9 09/10/2019       Imaging: I have personally reviewed pertinent films in PACS  EKG, Pathology, and Other Studies: I have personally reviewed pertinent reports  VTE Pharmacologic Prophylaxis: Enoxaparin (Lovenox)    Portions of the record may have been created with voice recognition software  Occasional wrong word or "sound a like" substitutions may have occurred due to the inherent limitations of voice recognition software  Read the chart carefully and recognize, using context, where substitutions have occurred

## 2019-09-10 NOTE — DISCHARGE SUMMARY
Discharge Summary -   Justino Robles 63 y o  male MRN: [id number]  Unit/Bed#: E5 -01 Encounter: [id number]    Admission Date: 9/7/2019     Admitting Diagnosis: Gas gangrene ([facility] [address] ) [A48 0]  Essential hypertension [I10]  Fever [R50 9]  Cellulitis of right lower extremity [L03 115]  Type 2 diabetes mellitus with diabetic polyneuropathy, with long-term current use of insulin ([facility] [address] ) [E11 42, Z79 4]   Sepsis - POA    HPI: Mr Justino Robles is a 64yo male with PMH significant for poorly controlled diabetes mellitus type 2 only on metformin, diabetic neuropathy, hypertension, chronic right foot wound presents with right foot redness and swelling  Patient has been following Dr Jacques Horne at [address] [place] [address] for management of his wound  About 3 days ago he started noticing swelling and increased redness to his right foot and ankle  Denies any foul smell  He states he's had this open wound for a couple months  States his wife changes his foot dressings on his foot and noticed color change and swelling  States he has been feeling fatigue, loss of appetite with subjective fever  Procedures Performed: INCISION AND DRAINAGE (I&D) EXTREMITY, RIGHT FOOT PARTIAL 5TH RAY AMPUTATION WITH TOE FILLET FLAP FOR CLOSURE    Hospital Course: Patient presented to ED on 9/7/19 evening, afebrile however WBC of 16 as well as tachycardia, x-ray performed of right foot that revealed local soft tissue emphysema to right 5th toe  Bedside decompression was performed in ED to right 5th toe  Deep wound cultures were obtained  Wound was then packed with plain packing  He was admitted then to podiatry service under Dr Jacques Horne for sepsis and right foot infection, will require IV antibiotics and surgical intervention during this admission  He was started on IV vancomycin and IV cefepime  Pharmacy was consulted for vancomycin dosing  Internal medicine and infectious disease were consulted   Patient underwent right foot I&D and partial 5th ray amputation with toe fillet flap on 9/8/19  His foot dressings were changed on 9/9/19 and 9/10/19 and fillet flap noted to be viable  Physical therapy and occupational therapy were consulted due to nonweightbearing status of his surgical foot  Internal medicine has been managing his diabetes care and recommend to be discharged with lantus insulin 28U qhs, glucometer, insulin syringe, test strips  Education was performed by internal medicine on proper insulin administration, signs of hypoglycemia  Nutrition services provided education on proper diabetic nutrion  Infectious disease recommends oral cephalexin 500mg qid to end 9/15/19  Patient states he has been given enough education on his insulin, diabetes and its relation to diabetic foot infection to succeed at home  Patient understands he must remain nonweight bearing to his right foot and to wear his surgical shoe for protection only  He is to follow up with Dr Jessica Guevara at wound care center within one week of discharge  Significant Findings, Care, Treatment and Services Provided: Same as above    Complications: none     Discharge Diagnosis: Right foot gas gangrene and cellulitis    Condition at Discharge: fair     Discharge instructions/Information to patient and family:   See after visit summary for information provided to patient and family  Provisions for Follow-Up Care/Important appointments:    See after visit summary for information related to follow-up care and any pertinent home health orders  Disposition: See After Visit Summary for discharge disposition information  Planned Readmission: No    Discharge Statement   I spent 30 minutes discharging the patient  This time was spent on the day of discharge  I had direct contact with the patient on the day of discharge  The details of this patient's discharge     Discharge Medications:  See after visit summary for reconciled discharge medications provided to patient and family

## 2019-09-10 NOTE — ASSESSMENT & PLAN NOTE
Lab Results   Component Value Date    HGBA1C 8 9 (H) 09/07/2019       Recent Labs     09/09/19  1120 09/09/19  1649 09/09/19  2035 09/10/19  0759   POCGLU 250* 175* 148* 178*       Blood Sugar Average: Last 72 hrs:  (P) 570 [id number]   · Maintained on metformin only, poorly controlled with A1c 8 9%   · Started on insulin regimen for better control and to promote wound healing   · Will send home on lantus 28 units qHS and continue metformin bid   · Educated patient at length today regarding insulin, how to inject insulin, checking his blood sugars 4 times a day before meals and at bedtime, symptoms of hypoglycemia and management of hypoglycemia, indications to return to ED and keeping blood sugar log  · Patient was understanding of all education provided   · Nursing will also be reinforcing insulin education today   · Patient states he feels comfortable with filling insulin vile and administration   · Scripts in chart for insulin (which called pharmacy and will be $30/month which patient is agreeable too), insulin syringes, glucometer and strips   · Added more information on insulin and hypoglycemia in chart for discharge to review  · Close follow up with PCP within 1 week to review blood sugar log and adjust insulin as needed

## 2019-09-10 NOTE — ASSESSMENT & PLAN NOTE
· hgb 11 6 on admission dropped to 9 6 post operatively , now stable at 11 1 today no active bleeding noted

## 2019-09-10 NOTE — DISCHARGE INSTRUCTIONS
Discharge Instructions - Podiatry    Weight Bearing Status: Nonweightbearing to right lower extremity                                             Pain: Continue analgesics as directed    F/u appointment Instructions: Please make an appointment within one week of discharge with Dr Jonathan Rider at [address] [place] [address]  Contact sooner if any increase in pain, or signs of infection occur    Wound Care: leave dressings clean, dry and intact                      How to Draw Up Insulin   AMBULATORY CARE:   Insulin  should be drawn up correctly and safely  This will help prevent problems such as infection or low or high blood sugar levels  Use the correct size insulin syringe to make sure you get the right dose of insulin  For example, you must inject U100 insulin with U100 syringes  A different syringe is needed for U500 insulin  Your healthcare provider or pharmacist will help you find the right size syringe  The syringe will have measurements in mL and units  Contact your healthcare provider if:   · You have questions about how to draw up insulin  · You cannot afford to buy your diabetes supplies  · You have questions or concerns about your condition or care  How to draw up 1 type of insulin into a syringe: If you use only 1 type of insulin at a time, do the following:  · Remove insulin from the refrigerator 30 minutes before you will use it  Inject insulin that is room temperature  · Wash your hands  This will help decrease your risk for an infection  · Gather your insulin supplies  Get your insulin bottle, syringe, and alcohol pads  Check the insulin bottle to make sure it is the right type and strength of insulin  Also check the expiration date  Do not use  insulin  Rapid and short-acting insulin should be clear with no particles  Do not use the insulin if there are clumps or particles in it  · Gently mix intermediate or long-acting insulin    These must be mixed before they are given  Turn the bottle on its side and roll it between the palms of your hands  Do not shake the bottle  This can make the insulin clump together  You do not need to mix rapid or short-acting insulin  · Prepare the insulin bottle  Clean the top of the insulin bottle with an alcohol pad or cotton swab dipped in alcohol  · Pull air into the syringe  Remove the cap from the needle  Pull back on the plunger to draw in an amount of air that is equal to your insulin dose  Place the bottle on a hard surface  Push the needle into the bottle top and inject the air into the bottle  Leave the needle in the bottle  This helps keep the correct amount of pressure in the bottle  It also makes it easier to draw up the insulin  · Draw up the insulin into the syringe  Turn the bottle and syringe upside down  Pull back on the plunger  Fill the syringe with a little more than the insulin dose you need  · Check the syringe for air bubbles  If you see bubbles, hold the bottle and syringe with 1 hand  Tap the syringe with 1 finger on your other hand  This will make the air bubbles rise to the top of the syringe  Slowly push on the plunger just enough to move out air and extra insulin  Check the syringe for the correct amount of insulin  · Remove the needle from the bottle  Do not let the end of the needle touch anything  Inject the insulin as directed  If you need to recap the needle, place the cap on a table or hard surface  Slowly slide the needle into the cap  How to draw up 2 types of insulin into a syringe: If you use 2 types of insulin at one time, do the following:  · Remove insulin from the refrigerator 30 minutes before you will use it  Inject insulin that is room temperature  · Wash your hands  This will help decrease your risk for an infection  · Gather your insulin supplies  Get your insulin bottle, syringe, and alcohol pads   Check the insulin bottle to make sure it is the right type and strength of insulin  Also check the expiration date  Do not use  insulin  Rapid and short-acting insulin should be clear with no particles  Do not use the insulin if there are clumps or particles in it  · Determine the total amount of insulin you need  Add the number of units of each type of insulin together  For example you may need 6 units of intermediate-acting insulin and 5 units of short-acting insulin  That is a total of 11 units of insulin  · Gently mix intermediate or long-acting insulin  These must be mixed before they are given  Turn the bottle on its side and roll it between the palms of your hands  Do not shake the bottle  This can make the insulin clump together  You do not need to mix the rapid or short-acting insulin  · Prepare the insulin bottles  Clean the top of both insulin bottles with an alcohol pad  · Prepare the syringe  Remove the cap from the needle  · Inject air into the intermediate or long-acting insulin bottle  This insulin should be cloudy  Pull back the plunger on the syringe to draw in an amount of air that is equal to your long-acting insulin dose  Place the bottle on a hard surface  Push the needle through the top of the long-acting insulin bottle and inject air into the bottle  Do not draw the insulin into the syringe  Remove the empty syringe and needle from the bottle  · Inject air into the short-acting insulin bottle  This insulin should be clear  Pull the plunger back to draw in enough air to equal your short-acting insulin dose  Push the needle in through the top of the short-acting insulin bottle  Inject air into the short-acting insulin bottle  Leave the needle in the bottle  · Draw up the short-acting insulin first   Turn the bottle and syringe upside down  Pull the plunger to fill the syringe with just a little more than the insulin dose you need  · Check the syringe for air bubbles    If you see any bubbles, hold the bottle and syringe with 1 hand  Tap the syringe with 1 finger on your other hand  This will make the air bubbles rise to the top of the syringe  Slowly push on the plunger to move out air and extra insulin  · Remove the needle from the bottle  Recheck your dose  · Draw up the intermediate or long-acting insulin  Insert the needle into the bottle of long-acting insulin  Turn the bottle and syringe upside down  Slowly pull on the plunger to draw insulin into the syringe  Because the short-acting insulin dose is already in the syringe, pull the plunger to the total number of units you need  · Check for bubbles  Hold the bottle and syringe with 1 hand  Gently tap the syringe with 1 finger on your other hand  This will make them rise to the top  Do not push air into the bottle  This may cause you to push the mixed insulin into the bottle  · Remove the needle from the bottle  Do not let the end of the needle touch anything  Inject the insulin as directed  If you need to recap the needle, place the cap on a table or hard surface  Slowly slide the needle into the cap  Follow up with your healthcare provider as directed:  Write down your questions so you remember to ask them during your visits  © 2017 [address] [employer] Information is for End User's use only and may not be sold, redistributed or otherwise used for commercial purposes  All illustrations and images included in CareNotes® are the copyrighted property of [employer]  or [employer]ven[employer] Perez  The above information is an  only  It is not intended as medical advice for individual conditions or treatments  Talk to your doctor, nurse or pharmacist before following any medical regimen to see if it is safe and effective for you  How to Give an Insulin Injection   WHAT YOU NEED TO KNOW:   Insulin syringes come in different sizes depending on the dose of insulin you need   Use the correct size syringe to make sure you get the right dose of insulin  Your healthcare provider or pharmacist will help you find the right size syringe for you  DISCHARGE INSTRUCTIONS:   Contact your healthcare provider if:   · You feel or see hard lumps in your skin where you inject your insulin  · You think you gave yourself too much or not enough insulin  · Your injections are very painful  · You see blood or clear fluid on your injection site more than once after you inject insulin  · You have questions about how to give the injection  · You cannot afford to buy your diabetes supplies  · You have questions or concerns about your condition or care  Where to inject insulin:   · You can inject insulin into your abdomen, upper arm, buttocks, hip, and the front or side of the thigh  Insulin works fastest when it is injected into the abdomen  · Do not inject insulin into areas where you have a wound or bruising  Insulin injected into wounds or bruises may not get into your body correctly  · Use a different area within the site each time you inject insulin  For example, inject insulin into different areas in your abdomen  Insulin injected into the same area can cause lumps, swelling, or thickened skin  How to inject insulin with a syringe:   · Clean the skin where you will inject the insulin  You can use an alcohol pad or a cotton swab dipped in alcohol  · Grab a fold of your skin  Gently pinch the skin and fat between your thumb and first finger  · Insert the needle straight into your skin  Do not hold the syringe at an angle  Make sure the needle is all the way into the skin  Let go of the pinched tissue  · Push down on the plunger to inject the insulin  Press on the plunger until the insulin is gone  Keep the needle in place for 5 seconds after you inject the insulin  · Pull out the needle  Press on your injection site for 5 to 10 seconds  Do not rub  This will keep insulin from leaking out       · Throw away your used insulin syringe as directed  Do not recap the syringe before you throw it away  Decrease pain when you inject insulin:   · Inject insulin at room temperature  If the insulin has been stored in the refrigerator, remove it 30 minutes before you inject it  · Remove all air bubbles from the syringe before the injection  · If you clean your skin with an alcohol pad, wait until it has dried before you inject insulin  · Relax the muscles at the injection site  · Do not change the direction of the needle during insertion or removal   Reuse your syringe only as directed: You may increase your risk for a bacterial infection when you reuse syringes  Ask your healthcare provider if it is safe for you to reuse a syringe  Do not reuse a syringe if you have an open wound, trouble seeing, or have an infection  The following are tips on how to safely reuse a syringe:  · Recap the needle as soon as you are done using it  Place the cap on a table or hard surface and slide the needle into the cap  · Do not let the needle touch anything but clean skin or the top of the insulin bottle  · Never  share syringes with anyone  · Do not clean your needle with alcohol  This will remove the coating that helps your needle slide easily into your skin  · Throw out any syringe that bends or touches anything other than clean skin  Where to get rid of used syringes:  Ask your healthcare provider where to get rid of your syringes  He may tell you to place the syringe in a heavy-duty laundry detergent bottle or a metal coffee can  The container should have a cap that fits securely  Ask your local waste authority if you need to follow certain rules for getting rid of your syringes  Bring your used syringes home with you when you travel  Pack them in a plastic or metal container with a secure lid     Follow up with your healthcare provider as directed:  Write down your questions so you remember to ask them during your visits  © 2017 [address] [employer] Information is for End User's use only and may not be sold, redistributed or otherwise used for commercial purposes  All illustrations and images included in CareNotes® are the copyrighted property of [employer]  or [employer]  The above information is an  only  It is not intended as medical advice for individual conditions or treatments  Talk to your doctor, nurse or pharmacist before following any medical regimen to see if it is safe and effective for you  Hypoglycemia in a Person with Diabetes   WHAT YOU NEED TO KNOW:   Hypoglycemia is a serious condition that happens when your blood glucose (sugar) level drops too low  The blood sugar level is usually too high in a person with diabetes, but the level can also drop too low  It is important to follow your diabetes management plan to keep your blood sugar level steady  DISCHARGE INSTRUCTIONS:   Call 911 for any of the following:   · You have a seizure or pass out  · You feel you are going to pass out  · You have trouble thinking clearly  Seek care immediately if:  · Your blood sugar is less than 50 mg/dL and does not respond to treatment  Contact your healthcare provider if:   · You have had symptoms of low blood sugar several times  · You have questions about the amount of insulin or diabetes medicine you are taking  · You have questions or concerns about your condition or care  Medicines:   · Insulin or diabetes medicine  help to keep your blood sugar under control  · Glucagon  may be needed if you have severe hypoglycemia  · Take your medicine as directed  Contact your healthcare provider if you think your medicine is not helping or if you have side effects  Tell him or her if you are allergic to any medicine  Keep a list of the medicines, vitamins, and herbs you take  Include the amounts, and when and why you take them   Bring the list or the pill bottles to follow-up visits  Carry your medicine list with you in case of an emergency  Follow up with your healthcare provider as directed: You may need dose changes to your insulin or oral diabetes medicine if you have hypoglycemia  Write down your questions so you remember to ask them during your visits  Manage hypoglycemia:   · Check your blood sugar level right away if you have symptoms of hypoglycemia  Hypoglycemia is usually 70 mg/dL or below  Ask your healthcare provider what blood sugar level is too low for you  · If your blood sugar level is too low, eat or drink 15 grams of fast-acting carbohydrate  Examples of this amount of fast-acting carbohydrate are 4 ounces (½ cup) of fruit juice or 4 ounces of regular soda  Other examples are 2 tablespoons of raisins or 3 to 4 glucose tablets  Check your blood sugar level 15 minutes later  If the level is still low (less than 100 mg/dL), have another 15 grams of carbohydrate  When the level returns to 100 mg/dL, eat a snack or meal that contains carbohydrates  This will help prevent another drop in blood sugar  Always carefully follow your healthcare provider's instructions on how to treat low blood sugar levels  · Always carry a source of fast-acting carbohydrate  If you have symptoms of hypoglycemia and you do not have a blood glucose meter, have a source of fast-acting carbohydrate anyway  Avoid carbohydrate foods that are high in fat  The fat content may make it take longer to increase your blood sugar level  Ask your healthcare provider if you should carry a glucagon kit  Glucagon is a medicine that is injected when you develop severe hypoglycemia and become unconscious  Check the expiration date every month and replace it before it expires  · Teach others how to help you if you have symptoms of hypoglycemia  Tell them about the symptoms of hypoglycemia   Ask them to give you a source of fast-acting carbohydrate if you cannot get it yourself  Ask them to give you a glucagon injection if you have symptoms of hypoglycemia and you become unconscious or have a seizure  Ask them to call 911   This is an emergency  Tell them never to try to make you swallow anything if you faint or have a seizure  · Wear medical alert jewelry  or carry a card that says you have diabetes  Ask where to get these items  Prevent hypoglycemia:   · Take diabetes medicine as directed  Take your medicine at the right time and in the right amount  Your healthcare provider may change your blood sugar goals if you get hypoglycemia often  · Eat regular meals and snacks  Talk to your dietitian or healthcare provider about a meal plan that is right for you  Do not skip meals  · Check your blood sugar level as directed  Ask your healthcare provider what your blood sugar levels should be before and after you eat  Ask when and how often to check your blood sugar level  You may need to check at least 3 times each day  Record your blood sugar level results and take the record with you when you see your healthcare provider  Your provider may use the record to make changes to your medicine, food, or exercise schedules  · Check your blood sugar level before you exercise  Exercise can decrease your blood sugar level  If your blood sugar level is less than 100 mg/dL, have a carbohydrate snack  Examples are 4 to 6 crackers, ½ banana, 8 ounces (1 cup) of nonfat or 1% milk, or 4 ounces (½ cup) of juice  If you will exercise for more than 1 hour, you may need to check your blood sugar level every 30 minutes  Your healthcare provider may also recommend that you check your blood sugar level after exercise  · Be aware of how alcohol affects your blood sugar level  Alcohol can cause your blood sugar level to drop for up to 12 hours after drinking  Ask your healthcare provider if alcohol is safe for you   If you drink alcohol, always have a snack or meal at the same time  Women should limit alcohol to 1 drink a day  Men should limit alcohol to 2 drinks a day  A drink of alcohol is 12 ounces of beer, 5 ounces of wine, or 1½ ounces of liquor  © 2017 [address] [employer] Information is for End User's use only and may not be sold, redistributed or otherwise used for commercial purposes  All illustrations and images included in CareNotes® are the copyrighted property of [employer]  or [employer]  The above information is an  only  It is not intended as medical advice for individual conditions or treatments  Talk to your doctor, nurse or pharmacist before following any medical regimen to see if it is safe and effective for you  What is Insulin   WHAT YOU NEED TO KNOW:   Insulin is a hormone made by the pancreas  Insulin helps your body use sugar for energy  It removes sugar from your blood and helps lower your blood sugar levels  You may need to take insulin if your pancreas is not making enough  You may also need to take insulin if your body cannot use insulin correctly  DISCHARGE INSTRUCTIONS:   Contact your healthcare provider if:   · You have questions or concerns about your condition or care  Use insulin safely and check your blood sugar levels:   · Prepare and give insulin as directed  Make sure you prepare and use your insulin correctly  Give yourself insulin based on your blood sugar level and directions from your healthcare provider  Ask him if you have questions  · Check your blood sugar level at least 3 times each day  Some types of insulin can cause your blood sugar level to decrease quickly  Ask your healthcare provider when to check your blood sugar levels during the day  Check it any time you have symptoms of high or low blood sugar levels  · Know what your blood sugar levels should be  If you check your blood sugar level before a meal , it should be between 80 and 130 mg/dL   If you check your blood sugar level 1 to 2 hours after a meal , it should be less than 180 mg/dL  Ask your healthcare provider if these are good goals for you  · Write down your blood sugar level results  Bring the results to your follow-up appointments  Your healthcare provider may use the results to make changes to your insulin type or dose, eating plan, or exercise plan  · Ask your healthcare provider how to manage your diabetes during sick days  You may need different amounts of insulin during illness  You may also need different amounts during stress, travel, or when you change your diet or activity  How to store insulin:  Follow the storage directions on the label or package insert that came with the insulin  Do not use your insulin if there are clumps or color changes  Cloudy insulin that has small, white, particles that will not mix should be thrown away  Clear insulin that looks cloudy should be thrown away  · Always check the expiration date on your insulin bottle  Do not use insulin beyond its expiration date  · Do not store insulin in the freezer, direct sunlight, or the glove compartment of a car  Throw away insulin that has been frozen or exposed to very warm temperatures (above 85° F)  · You can store opened or unopened insulin in the refrigerator or at room temperature  If you store your insulin at room temperature, keep it in a cool, dry place  · If you travel, keep the insulin in a cool pack  This will help make sure the temperature of the insulin stays below 86° F (30° C)  Do not leave insulin in direct sunlight  Follow up with your healthcare provider as directed:  Write down your questions so you remember to ask them during your visits  © 2017 [address] [employer] Information is for End User's use only and may not be sold, redistributed or otherwise used for commercial purposes   All illustrations and images included in CareNotes® are the copyrighted property of [employer]  or "[employer]  The above information is an  only  It is not intended as medical advice for individual conditions or treatments  Talk to your doctor, nurse or pharmacist before following any medical regimen to see if it is safe and effective for you

## 2019-09-10 NOTE — PROGRESS NOTES
Progress Note Arturo Baird 1960, 63 y o  male MRN: [id number]  Unit/Bed#: E5 -01 Encounter: [id number]  Primary Care Provider: Brandee Lloyd DO   Date and time admitted to hospital: 9/7/2019  7:32 PM    * Gas gangrene [facility])  Assessment & Plan  · Podiatry primary   · POD #2 S/p I&D right extremity with right partial 5th ray amputation with toe fillet flap on 9/8   · Wound care and dressing changes per podiatry   · Monitoring flap viability   · Infectious Disease following for abx   · Continue IV vancomycin- anticipating transition to oral antibiotics once final culutres results   · contineu to monitor hgb post operatively as dropped from 11 6  On admission to 9 6 --> stable at 9 8 today no active signs of bleeding       Type 2 diabetes mellitus with diabetic polyneuropathy, without long-term current use of insulin [facility])  Assessment & Plan  Lab Results   Component Value Date    HGBA1C 8 9 (H) 09/07/2019       Recent Labs     09/08/19  1632 09/08/19  2056 09/09/19  0834 09/09/19  1120   POCGLU 164* 252* 235* 250*       Blood Sugar Average: Last 72 hrs:  (P) 241 [id number]   · Maintained on metformin only - held while here, very poorly controlled with A1c 8 9%   · Started on lantus 25 units qHs, still with hyperglycemia yesterday increased lantus to 28 units qHS will monitor increase in lantus for next 24 hours and adjust as needed   ·  continue SSI, diabetic diet   · Tight glucose control     Essential hypertension  Assessment & Plan  · Maintained on losartan- HCTZ   · Elevated BP this morning  · Please check manual BP as patient states BP were all different with multiple attempts with machine   · Will add IV hydralazine as needed   · Continue home medication regimen     Anemia  Assessment & Plan  · hgb 11 6 on admission dropped to 9 6 post operatively , stable at 9 8 today   · Continue to monitor         VTE Pharmacologic Prophylaxis:   Pharmacologic: Enoxaparin (Lovenox)  Mechanical VTE Prophylaxis in Place: No    Patient Centered Rounds: I have performed bedside rounds with nursing staff today  Discussions with Specialists or Other Care Team Provider:     Education and Discussions with Family / Patient: patient     Time Spent for Care: 20 minutes  More than 50% of total time spent on counseling and coordination of care as described above  Current Length of Stay: 3 day(s)    Current Patient Status: Inpatient   Certification Statement: The patient will continue to require additional inpatient hospital stay due to gas gangrene     Discharge Plan: per podiatry     Code Status: Level 1 - Full Code      Subjective:   Patient is doing well today without any acute complaints  Patient states that his blood pressure was hard to get on the blood pressure machine today and after multiple times got multiple different readings ranging from systolic 928 systolic of 651  He states his blood pressure has always been stable  No associated symptoms  Objective:     Vitals:   Temp (24hrs), Av 3 °F (36 3 °C), Min:97 2 °F (36 2 °C), Max:97 5 °F (36 4 °C)    Temp:  [97 2 °F (36 2 °C)-97 5 °F (36 4 °C)] 97 5 °F (36 4 °C)  HR:  [71-83] 78  Resp:  [18-20] 18  BP: (153-180)/(72-94) 180/94  SpO2:  [96 %] 96 %  Body mass index is 31 32 kg/m²  Input and Output Summary (last 24 hours): Intake/Output Summary (Last 24 hours) at 9/10/2019 1000  Last data filed at 9/10/2019 0857  Gross per 24 hour   Intake 1062 92 ml   Output    Net 1062 92 ml       Physical Exam:     Physical Exam   Constitutional: No distress  HENT:   Head: Normocephalic and atraumatic  Eyes: Conjunctivae are normal    Neck: Neck supple  Cardiovascular: Normal rate and regular rhythm  Pulmonary/Chest: Effort normal and breath sounds normal    Abdominal: Soft  Bowel sounds are normal    Musculoskeletal: He exhibits deformity (Dressing right foot)  Neurological: He is alert  Skin: Skin is warm  He is not diaphoretic  Psychiatric: He has a normal mood and affect  Nursing note and vitals reviewed  Additional Data:     Labs:    Results from last 7 days   Lab Units 09/10/19  0530  09/07/19  2045   WBC Thousand/uL 6 27   < > 15 24*   HEMOGLOBIN g/dL 9 8*   < > 11 6*   HEMATOCRIT % 30 9*   < > 34 4*   PLATELETS Thousands/uL 308   < > 315   NEUTROS PCT %  --   --  78*   LYMPHS PCT %  --   --  10*   MONOS PCT %  --   --  11   EOS PCT %  --   --  0    < > = values in this interval not displayed  Results from last 7 days   Lab Units 09/10/19  0530   SODIUM mmol/L 142   POTASSIUM mmol/L 3 4*   CHLORIDE mmol/L 107   CO2 mmol/L 25   BUN mg/dL 14   CREATININE mg/dL 0 88   ANION GAP mmol/L 10   CALCIUM mg/dL 8 5   GLUCOSE RANDOM mg/dL 109         Results from last 7 days   Lab Units 09/10/19  0759 09/09/19  2035 09/09/19  1649 09/09/19  1120 09/09/19  0834 09/08/19  2056 09/08/19  1632 09/08/19  1352 09/08/19  1059 09/08/19  0743 09/08/19  0611 09/07/19  2349   POC GLUCOSE mg/dl 178* 148* 175* 250* 235* 252* 164* 185* 224* 255* 296* 310*     Results from last 7 days   Lab Units 09/07/19  2045   HEMOGLOBIN A1C % 8 9*               * I Have Reviewed All Lab Data Listed Above  * Additional Pertinent Lab Tests Reviewed: All Labs Within Last 24 Hours Reviewed    Imaging:    Imaging Reports Reviewed Today Include: reviewed  Imaging Personally Reviewed by Myself Includes:  none    Recent Cultures (last 7 days):     Results from last 7 days   Lab Units 09/08/19  1256 09/07/19  2236 09/07/19  2228 09/07/19  1951   BLOOD CULTURE   --  No Growth at 48 hrs  --  No Growth at 24 hrs     GRAM STAIN RESULT  No polys seen*  2+ Gram positive cocci in pairs*  --  1+ Polys*  2+ Gram positive cocci in pairs*  --    WOUND CULTURE  2+ Growth of Beta Hemolytic Streptococcus Group B*  1+ Growth of Staphylococcus aureus*  Few Colonies of   --  4+ Growth of Beta Hemolytic Streptococcus Group B*  2+ Growth of Staphylococcus aureus*  --        Last 24 Hours Medication List:     Current Facility-Administered Medications:  acetaminophen 650 mg Oral Q6H PRN Debra Pillai, DPLORI   cefazolin 2,000 mg Intravenous Q8H JEFF Woodruff   enoxaparin 40 mg Subcutaneous Daily Debra Pillai, DPLORI   hydrALAZINE 5 mg Intravenous Q6H PRN Campbell Marquez PA-C   ibuprofen 800 mg Oral Q6H PRN Debra Pillai, DPLORI   insulin glargine 28 Units Subcutaneous HS Day De Jesus PA-C   insulin lispro 1-5 Units Subcutaneous HS Debra Pillai, DPLORI   insulin lispro 2-12 Units Subcutaneous TID AC Elgin Shook, DPLORI   losartan potassium-hydrochlorothiazide (HYZAAR 100/25) combo dose  Oral Daily Debra Pillai, DPM   oxyCODONE-acetaminophen 1 tablet Oral Q6H PRN Debra Pillai, DPM   oxyCODONE-acetaminophen 1 tablet Oral Q4H PRN Debra Pillai, DPM   potassium chloride 20 mEq Oral Once Day De Jesus PA-C   pravastatin 40 mg Oral Daily With Dinner Debra Pillai DPM        Today, Patient Was Seen By: Campbell Marquez PA-C    ** Please Note: Dictation voice to text software may have been used in the creation of this document   **

## 2019-09-10 NOTE — PROGRESS NOTES
Progress Note - Infectious Disease   Julian Meng 63 y o  male MRN: [id number]  Unit/Bed#: E5 -01 Encounter: [id number]    Impression/Plan:  1  Sepsis  POA  Tachycardia and leukocytosis  Secondary to right foot gas gangrene and cellulitis  No other clear source appreciated  Blood cultures are negative after 48 hours  His chest x-ray was negative for acute infectious cardiopulmonary findings  Patient is now status post wound debridement as well as R 5th partial ray amp and toe fillet flap  All wound cultures are preliminarily showing beta-hemolytic strep group B  Final sensitivities are pending  Fortunately, the patient has remained clinically stable and nontoxic  Today he is afebrile and his WBC count has normalized  -antibiotic as below  -monitor CBC and BMP  -follow up blood cultures  -follow up final wound culture sensitivities  -monitor vitals  -supportive care     2  Right foot cellulitis  With gas gangrene  Secondary to a chronic right plantar 5th toe diabetic ulcer  Patient is status post bedside debridement  He then went to the OR on 09/08/2019 for a right partial 5th ray amputation and toe fillet flap  Wound culture and intraoperative cultures are both preliminarily showing group B beta-hemolytic strep  Blood cultures are negative after 48 hours  Given his updated culture results I recommend stopping the vancomycin and transitioning to IV cefazolin  Will hopefully transition to oral antibiotic regimen once final culture results are received  -stop IV vancomycin  -start IV cefazolin, 2 g q 8 hours  -anticipate rapid transition to oral antibiotic regimen once final culture results are received  -anticipate one week postop antibiotics through 09/15/2019  -monitor CBC and BMP  -follow up final wound culture sensitivities  -monitor vitals  -serial right foot exams     3  Chronic right plantar 5th toe diabetic ulcer    Patient has been going for routine follow up at the outpatient wound [facility]  However, he recently noticed blistering to the wound  He is now status post bedside debridement  He went for a right partial 5th ray amp and toe fillet flap on 2019  Cultures are preliminarily showing group B beta-hemolytic strep  Wound is surgically cured  Flap remaines stable on podiatry exam this morning  He continues to follow closely with Podiatry   -antibiotic as above  -anticipate one week postop antibiotics through 09/15/2019  -follow up final wound culture sensitivities  -serial right foot exams  -local wound care/flat management per Podiatry  -continue close follow-up with Podiatry     4  Uncontrolled type 2 diabetes mellitus with neuropathy  Patient's last hemoglobin A1c was 8 9% on 2019  Likely contributor to his infection above  Recommend patient committed to tighter glycemic control for overall health, especially in the setting of wound infection   -blood glucose management per primary service    Above plan was discussed in detail with patient at the bedside  Antibiotics:  Vancomycin 4 - stop  Cefazolin 1  Antibiotics 4  Postop 2    Subjective:  Patient reports he is feeling well today  He is not having any pain in his right foot  Reports the podiatrist change the dressing and told him the site was looking good  He has no fever, chills, sweats, shakes; no nausea, vomiting, abdominal pain, diarrhea, or dysuria; no cough, shortness of breath, or chest pain  No new symptoms  Objective:  Vitals:  Temp:  [97 2 °F (36 2 °C)-97 5 °F (36 4 °C)] 97 5 °F (36 4 °C)  HR:  [71-83] 78  Resp:  [18-20] 18  BP: (153-180)/(72-94) 180/94  SpO2:  [96 %] 96 %  Temp (24hrs), Av 3 °F (36 3 °C), Min:97 2 °F (36 2 °C), Max:97 5 °F (36 4 °C)  Current: Temperature: 97 5 °F (36 4 °C)    Physical Exam:   General Appearance:  Alert, interactive, nontoxic, no acute distress  Is wearing his glasses  Throat: Oropharynx moist without lesions      Lungs:   Clear to auscultation bilaterally; no wheezes, rhonchi or rales; respirations unlabored   Heart:  RRR; no murmur, rub or gallop   Abdomen:   Soft, non-tender, non-distended, positive bowel sounds  Extremities: No clubbing or cyanosis, no edema   Skin: No new rashes, lesions, or draining wounds noted on exposed skin  Bulky dressing intact to the right foot without breakthrough drainage  Labs, Imaging, & Other studies:   All pertinent labs and imaging studies were personally reviewed  Results from last 7 days   Lab Units 09/10/19  0530 09/09/19  0530 09/08/19  0614   WBC Thousand/uL 6 27 6 98 9 52   HEMOGLOBIN g/dL 9 8* 9 6* 11 0*   PLATELETS Thousands/uL 308 267 260     Results from last 7 days   Lab Units 09/10/19  0530   POTASSIUM mmol/L 3 4*   CHLORIDE mmol/L 107   CO2 mmol/L 25   BUN mg/dL 14   CREATININE mg/dL 0 88   EGFR ml/min/1 73sq m 95   CALCIUM mg/dL 8 5     Results from last 7 days   Lab Units 09/08/19  1256 09/07/19  2236 09/07/19  2228 09/07/19 1951   BLOOD CULTURE   --  No Growth at 48 hrs  --  No Growth at 24 hrs     GRAM STAIN RESULT  No polys seen*  2+ Gram positive cocci in pairs*  --  1+ Polys*  2+ Gram positive cocci in pairs*  --    WOUND CULTURE  2+ Growth of Beta Hemolytic Streptococcus Group B*  1+ Growth of Staphylococcus aureus*  Few Colonies of   --  4+ Growth of Beta Hemolytic Streptococcus Group B*  2+ Growth of Staphylococcus aureus*  --

## 2019-09-11 VITALS
TEMPERATURE: 98 F | HEART RATE: 60 BPM | BODY MASS INDEX: 31.38 KG/M2 | OXYGEN SATURATION: 97 % | WEIGHT: 199.96 LBS | RESPIRATION RATE: 20 BRPM | DIASTOLIC BLOOD PRESSURE: 92 MMHG | SYSTOLIC BLOOD PRESSURE: 165 MMHG | HEIGHT: 67 IN

## 2019-09-11 PROBLEM — A48.0 GAS GANGRENE (HCC): Status: RESOLVED | Noted: 2019-09-07 | Resolved: 2019-09-11

## 2019-09-11 PROBLEM — A41.9: Status: RESOLVED | Noted: 2019-09-11 | Resolved: 2019-09-11

## 2019-09-11 PROBLEM — L03.115 CELLULITIS OF RIGHT LOWER EXTREMITY: Status: RESOLVED | Noted: 2019-09-07 | Resolved: 2019-09-11

## 2019-09-11 PROBLEM — A41.9: Status: ACTIVE | Noted: 2019-09-11

## 2019-09-11 LAB
ANION GAP SERPL CALCULATED.3IONS-SCNC: 7 MMOL/L (ref 4–13)
BACTERIA WND AEROBE CULT: ABNORMAL
BASOPHILS # BLD AUTO: 0.09 THOUSANDS/ΜL (ref 0–0.1)
BASOPHILS NFR BLD AUTO: 1 % (ref 0–1)
BUN SERPL-MCNC: 13 MG/DL (ref 5–25)
CALCIUM SERPL-MCNC: 9.1 MG/DL (ref 8.3–10.1)
CHLORIDE SERPL-SCNC: 104 MMOL/L (ref 100–108)
CO2 SERPL-SCNC: 29 MMOL/L (ref 21–32)
CREAT SERPL-MCNC: 0.94 MG/DL (ref 0.6–1.3)
EOSINOPHIL # BLD AUTO: 0.33 THOUSAND/ΜL (ref 0–0.61)
EOSINOPHIL NFR BLD AUTO: 5 % (ref 0–6)
ERYTHROCYTE [DISTWIDTH] IN BLOOD BY AUTOMATED COUNT: 12.6 % (ref 11.6–15.1)
GFR SERPL CREATININE-BSD FRML MDRD: 89 ML/MIN/1.73SQ M
GLUCOSE SERPL-MCNC: 121 MG/DL (ref 65–140)
GLUCOSE SERPL-MCNC: 133 MG/DL (ref 65–140)
GLUCOSE SERPL-MCNC: 163 MG/DL (ref 65–140)
GRAM STN SPEC: ABNORMAL
HCT VFR BLD AUTO: 35 % (ref 36.5–49.3)
HGB BLD-MCNC: 11.1 G/DL (ref 12–17)
IMM GRANULOCYTES # BLD AUTO: 0.07 THOUSAND/UL (ref 0–0.2)
IMM GRANULOCYTES NFR BLD AUTO: 1 % (ref 0–2)
LYMPHOCYTES # BLD AUTO: 1.88 THOUSANDS/ΜL (ref 0.6–4.47)
LYMPHOCYTES NFR BLD AUTO: 25 % (ref 14–44)
MCH RBC QN AUTO: 28.5 PG (ref 26.8–34.3)
MCHC RBC AUTO-ENTMCNC: 31.7 G/DL (ref 31.4–37.4)
MCV RBC AUTO: 90 FL (ref 82–98)
MONOCYTES # BLD AUTO: 0.89 THOUSAND/ΜL (ref 0.17–1.22)
MONOCYTES NFR BLD AUTO: 12 % (ref 4–12)
NEUTROPHILS # BLD AUTO: 4.15 THOUSANDS/ΜL (ref 1.85–7.62)
NEUTS SEG NFR BLD AUTO: 56 % (ref 43–75)
NRBC BLD AUTO-RTO: 0 /100 WBCS
PLATELET # BLD AUTO: 399 THOUSANDS/UL (ref 149–390)
PMV BLD AUTO: 8.8 FL (ref 8.9–12.7)
POTASSIUM SERPL-SCNC: 3.5 MMOL/L (ref 3.5–5.3)
RBC # BLD AUTO: 3.89 MILLION/UL (ref 3.88–5.62)
SODIUM SERPL-SCNC: 140 MMOL/L (ref 136–145)
WBC # BLD AUTO: 7.41 THOUSAND/UL (ref 4.31–10.16)

## 2019-09-11 PROCEDURE — 85025 COMPLETE CBC W/AUTO DIFF WBC: CPT | Performed by: INTERNAL MEDICINE

## 2019-09-11 PROCEDURE — 99024 POSTOP FOLLOW-UP VISIT: CPT | Performed by: PODIATRIST

## 2019-09-11 PROCEDURE — 82948 REAGENT STRIP/BLOOD GLUCOSE: CPT

## 2019-09-11 PROCEDURE — 80048 BASIC METABOLIC PNL TOTAL CA: CPT | Performed by: INTERNAL MEDICINE

## 2019-09-11 PROCEDURE — 99232 SBSQ HOSP IP/OBS MODERATE 35: CPT | Performed by: INTERNAL MEDICINE

## 2019-09-11 PROCEDURE — 99232 SBSQ HOSP IP/OBS MODERATE 35: CPT | Performed by: PHYSICIAN ASSISTANT

## 2019-09-11 RX ORDER — CEPHALEXIN 500 MG/1
500 CAPSULE ORAL EVERY 6 HOURS SCHEDULED
Status: DISCONTINUED | OUTPATIENT
Start: 2019-09-11 | End: 2019-09-11 | Stop reason: HOSPADM

## 2019-09-11 RX ORDER — CEPHALEXIN 500 MG/1
500 CAPSULE ORAL EVERY 6 HOURS SCHEDULED
Qty: 16 CAPSULE | Refills: 0 | Status: SHIPPED | OUTPATIENT
Start: 2019-09-11 | End: 2019-09-15

## 2019-09-11 RX ADMIN — CEFAZOLIN SODIUM 2000 MG: 2 SOLUTION INTRAVENOUS at 04:04

## 2019-09-11 RX ADMIN — ENOXAPARIN SODIUM 40 MG: 40 INJECTION SUBCUTANEOUS at 08:55

## 2019-09-11 RX ADMIN — HYDROCHLOROTHIAZIDE: 25 TABLET ORAL at 08:53

## 2019-09-11 RX ADMIN — CEPHALEXIN 500 MG: 500 CAPSULE ORAL at 12:23

## 2019-09-11 RX ADMIN — INSULIN LISPRO 2 UNITS: 100 INJECTION, SOLUTION INTRAVENOUS; SUBCUTANEOUS at 12:23

## 2019-09-11 NOTE — CASE MANAGEMENT
CM delivered RW to patient prior to discharge and delivery ticket signed  Wife at bedside to transport patient and patient denied concerns with affording newly prescribed insulin  CM informed patient to contact PCP if became an issue  No other discharge needs identified at this time  CM department will continue to follow through discharge

## 2019-09-11 NOTE — CONSULTS
Pt and wife stated they had DM diet education years ago but nothing since  He was on Metformin and will now be on Metformin with a long acting insulin once per day, per chart  He sees his PCP for BG management  He eats three meals per day  B: usually eggs and fruit  L: sandwich, salad or a cooked meal at home  D: wife usually cooks  He denies eating sweets  If he snacks it is usually on fruit, pretzels or unsalted nuts  Drinks mostly water throughout the day and coffee in the morning  We discussed carb serving sizes, label reading, how much carb and protein to aim for per day  Discussed healthy protein foods and healthier carb choices  Provided MNT outpatient information and encouraged him to schedule an appointment after discharge for further support in blood glucose management

## 2019-09-11 NOTE — PROGRESS NOTES
Progress Note - Podiatry  Alexia Wadsworth 63 y o  male MRN: [id number]  Unit/Bed#: E5 -01 Encounter: [id number]    Assessment/Plan:  62yo male with DM2 with neuropathy and right foot DFU and gas gangrene s/p bedside decompression (9/7/19) and parital 5th ray amputation with toe fillet flap (9/8/19)     1) Right foot cellulitis with gas gangrene now s/p partial 5th ray amp  - incision site is well approximated with sutures intact, fillet flap cap refill is brisk, distal flap area with some duskiness  2) DM2 with peripheral neuropathy - last Hgb A1c 8 9%  3) HTN    Plan:  - will transition to oral Keflex per ID to end 9/15  - NWB to RLE, PT/OT recommend walker on discharge  - newly prescribed insulin, education will be provided by nursing staff, nutrition consult for proper nutrition needs  - patient is stable for discharge today, dispo planning    Subjective/Objective   Chief Complaint:   Chief Complaint   Patient presents with    Weakness - Generalized     patient reports dark yellow urine, diarrhea, foot and hand pain, right leg swelling, chronic wound on right leg  headache  Subjective: 63 y o  y/o male was seen and evaluated at bedside  No acute events overnight  Blood pressure 165/92, pulse 60, temperature 98 °F (36 7 °C), temperature source Temporal, resp  rate 20, height 5' 7" (1 702 m), weight 90 7 kg (199 lb 15 3 oz), SpO2 97 %  ,Body mass index is 31 32 kg/m²      Invasive Devices     Peripheral Intravenous Line            Peripheral IV 09/09/19 Left Antecubital 2 days                Physical Exam:   General: Alert, cooperative and no distress  Lungs: Non labored breathing  Heart: Regular rhythm and rate  Abdomen: non-tender, non-distended  Lower Extremities: right foot surgical site well approximated with sutures intact, filet flap cap refill time is brisk, distal flap is dusky, erythema improved yesterday                   Lab, Imaging and other studies:   CBC:   Lab Results   Component Value Date    WBC 7 41 09/11/2019    HGB 11 1 (L) 09/11/2019    HCT 35 0 (L) 09/11/2019    MCV 90 09/11/2019     (H) 09/11/2019    MCH 28 5 09/11/2019    MCHC 31 7 09/11/2019    RDW 12 6 09/11/2019    MPV 8 8 (L) 09/11/2019    NRBC 0 09/11/2019       Imaging: I have personally reviewed pertinent films in PACS  EKG, Pathology, and Other Studies: I have personally reviewed pertinent reports  VTE Pharmacologic Prophylaxis: Enoxaparin (Lovenox)    Portions of the record may have been created with voice recognition software  Occasional wrong word or "sound a like" substitutions may have occurred due to the inherent limitations of voice recognition software  Read the chart carefully and recognize, using context, where substitutions have occurred

## 2019-09-11 NOTE — PROGRESS NOTES
Progress Note Patric Sylvester 1960, 63 y o  male MRN: [id number]  Unit/Bed#: E5 -01 Encounter: [id number]  Primary Care Provider: Karli Gramajo DO   Date and time admitted to hospital: 9/7/2019  7:32 PM    * Gas gangrene [facility])  Assessment & Plan  · Podiatry primary   · POD #3 S/p I&D right extremity with right partial 5th ray amputation with toe fillet flap on 9/8   · Wound care and dressing changes per podiatry   · Monitoring flap viability   · Infectious Disease following for abx   · Transitioned to po Keflex   · hgb improving and stable   · Okay for discharge from IM standpoint will sign off please call with questions (see below for insulin plan, scripts in chart)       Type 2 diabetes mellitus with diabetic polyneuropathy, without long-term current use of insulin [facility])  Assessment & Plan  Lab Results   Component Value Date    HGBA1C 8 9 (H) 09/07/2019       Recent Labs     09/09/19  1120 09/09/19  1649 09/09/19  2035 09/10/19  0759   POCGLU 250* 175* 148* 178*       Blood Sugar Average: Last 72 hrs:  (P) 507 [id number]   · Maintained on metformin only, poorly controlled with A1c 8 9%   · Started on insulin regimen for better control and to promote wound healing   · Will send home on lantus 28 units qHS and continue metformin bid   · Educated patient at length today regarding insulin, how to inject insulin, checking his blood sugars 4 times a day before meals and at bedtime, symptoms of hypoglycemia and management of hypoglycemia, indications to return to ED and keeping blood sugar log  · Patient was understanding of all education provided   · Nursing will also be reinforcing insulin education today   · Patient states he feels comfortable with filling insulin vile and administration   · Scripts in chart for insulin (which called pharmacy and will be $30/month which patient is agreeable too), insulin syringes, glucometer and strips   · Added more information on insulin and hypoglycemia in chart for discharge to review  · Close follow up with PCP within 1 week to review blood sugar log and adjust insulin as needed     Essential hypertension  Assessment & Plan  · Maintained on losartan- HCTZ   · Continue home medication regimen     Anemia  Assessment & Plan  · hgb 11 6 on admission dropped to 9 6 post operatively , now stable at 11 1 today no active bleeding noted         VTE Pharmacologic Prophylaxis:   Pharmacologic: Enoxaparin (Lovenox)  Mechanical VTE Prophylaxis in Place: No    Patient Centered Rounds: I have performed bedside rounds with nursing staff today  Discussions with Specialists or Other Care Team Provider: nursing, Infectious Disease     Education and Discussions with Family / Patient: patient     Time Spent for Care: 20 minutes  More than 50% of total time spent on counseling and coordination of care as described above  Current Length of Stay: 4 day(s)    Current Patient Status: Inpatient   Certification Statement: The patient will continue to require additional inpatient hospital stay due to gas gangrene     Discharge Plan: per podiatry     Code Status: Level 1 - Full Code      Subjective:   Doing well today  No complaints  Answered all questions about insulin and patient feels comfortable  3    Objective:     Vitals:   Temp (24hrs), Av 7 °F (36 5 °C), Min:97 5 °F (36 4 °C), Max:98 °F (36 7 °C)    Temp:  [97 5 °F (36 4 °C)-98 °F (36 7 °C)] 98 °F (36 7 °C)  HR:  [60-70] 60  Resp:  [18-20] 20  BP: (162-176)/(80-92) 165/92  SpO2:  [96 %-97 %] 97 %  Body mass index is 31 32 kg/m²  Input and Output Summary (last 24 hours):     No intake or output data in the 24 hours ending 19 1225    Physical Exam:     Physical Exam   Constitutional: No distress  Very pleasant    HENT:   Head: Normocephalic and atraumatic  Eyes: Conjunctivae are normal    Neck: Neck supple  Cardiovascular: Normal rate and regular rhythm     Pulmonary/Chest: Effort normal and breath sounds normal  Abdominal: Soft  Musculoskeletal: He exhibits deformity (right LE dressing present)  Neurological: He is alert  Skin: Skin is warm  He is not diaphoretic  Psychiatric: He has a normal mood and affect  Nursing note and vitals reviewed  Additional Data:     Labs:    Results from last 7 days   Lab Units 09/11/19  0517   WBC Thousand/uL 7 41   HEMOGLOBIN g/dL 11 1*   HEMATOCRIT % 35 0*   PLATELETS Thousands/uL 399*   NEUTROS PCT % 56   LYMPHS PCT % 25   MONOS PCT % 12   EOS PCT % 5     Results from last 7 days   Lab Units 09/11/19  0517   SODIUM mmol/L 140   POTASSIUM mmol/L 3 5   CHLORIDE mmol/L 104   CO2 mmol/L 29   BUN mg/dL 13   CREATININE mg/dL 0 94   ANION GAP mmol/L 7   CALCIUM mg/dL 9 1   GLUCOSE RANDOM mg/dL 121         Results from last 7 days   Lab Units 09/11/19  1057 09/11/19  0727 09/10/19  2157 09/10/19  1559 09/10/19  1109 09/10/19  0759 09/09/19  2035 09/09/19  1649 09/09/19  1120 09/09/19  0834 09/08/19  2056 09/08/19  1632   POC GLUCOSE mg/dl 163* 133 160* 190* 168* 178* 148* 175* 250* 235* 252* 164*     Results from last 7 days   Lab Units 09/07/19  2045   HEMOGLOBIN A1C % 8 9*               * I Have Reviewed All Lab Data Listed Above  * Additional Pertinent Lab Tests Reviewed: All Labs Within Last 24 Hours Reviewed    Imaging:    Imaging Reports Reviewed Today Include: reviewed  Imaging Personally Reviewed by Myself Includes:  none    Recent Cultures (last 7 days):     Results from last 7 days   Lab Units 09/08/19  1256 09/07/19  2236 09/07/19  2228 09/07/19 1951   BLOOD CULTURE   --  No Growth at 72 hrs   --  No Growth at 72 hrs     GRAM STAIN RESULT  No polys seen*  2+ Gram positive cocci in pairs*  --  1+ Polys*  2+ Gram positive cocci in pairs*  --    WOUND CULTURE  3+ Growth of Beta Hemolytic Streptococcus Group B*  2+ Growth of Staphylococcus aureus*  Few Colonies of   --  4+ Growth of Beta Hemolytic Streptococcus Group B*  2+ Growth of Staphylococcus aureus*  Few Colonies of   --        Last 24 Hours Medication List:     Current Facility-Administered Medications:  acetaminophen 650 mg Oral Q6H PRN Angela Alford DPM   cephalexin 500 mg Oral Q6H [address] JEFF Woodruff   enoxaparin 40 mg Subcutaneous Daily Angela Alford DPM   hydrALAZINE 5 mg Intravenous Q6H PRN Tien Alvarez PA-C   ibuprofen 800 mg Oral Q6H PRN Angela Alford DPM   insulin glargine 28 Units Subcutaneous HS Day De Jesus PA-C   insulin lispro 1-5 Units Subcutaneous HS Elgin Shook, MONO   insulin lispro 2-12 Units Subcutaneous TID AC Elgin Shook DPM   losartan potassium-hydrochlorothiazide (HYZAAR 100/25) combo dose  Oral Daily Angela Alford, MONO   oxyCODONE-acetaminophen 1 tablet Oral Q6H PRN Angela Alford, MONO   oxyCODONE-acetaminophen 1 tablet Oral Q4H PRN Angela Alford, MONO   pravastatin 40 mg Oral Daily With Dinner Angela Alford DPM        Today, Patient Was Seen By: Tien Alvarez PA-C    ** Please Note: Dictation voice to text software may have been used in the creation of this document   **

## 2019-09-11 NOTE — PLAN OF CARE
Problem: Nutrition/Hydration-ADULT  Goal: Nutrient/Hydration intake appropriate for improving, restoring or maintaining nutritional needs  Description  Monitor and assess patient's nutrition/hydration status for malnutrition  Collaborate with interdisciplinary team and initiate plan and interventions as ordered  Monitor patient's weight and dietary intake as ordered or per policy  Utilize nutrition screening tool and intervene as necessary  Determine patient's food preferences and provide high-protein, high-caloric foods as appropriate  INTERVENTIONS:  - Monitor oral intake, urinary output, labs, and treatment plans  - Assess nutrition and hydration status and recommend course of action  - Evaluate amount of meals eaten  - Assist patient with eating if necessary   - Allow adequate time for meals  - Recommend/ encourage appropriate diets, oral nutritional supplements, and vitamin/mineral supplements  - Order, calculate, and assess calorie counts as needed  - Recommend, monitor, and adjust tube feedings and TPN/PPN based on assessed needs  - Assess need for intravenous fluids  - Provide specific nutrition/hydration education as appropriate  - Include patient/family/caregiver in decisions related to nutrition  Outcome: Progressing     Problem: Potential for Falls  Goal: Patient will remain free of falls  Description  INTERVENTIONS:  - Assess patient frequently for physical needs  -  Identify cognitive and physical deficits and behaviors that affect risk of falls    -  [place] fall precautions as indicated by assessment   - Educate patient/family on patient safety including physical limitations  - Instruct patient to call for assistance with activity based on assessment  - Modify environment to reduce risk of injury  - Consider OT/PT consult to assist with strengthening/mobility  Outcome: Progressing     Problem: METABOLIC, FLUID AND ELECTROLYTES - ADULT  Goal: Electrolytes maintained within normal limits  Description  INTERVENTIONS:  - Monitor labs and assess patient for signs and symptoms of electrolyte imbalances  - Administer electrolyte replacement as ordered  - Monitor response to electrolyte replacements, including repeat lab results as appropriate  - Instruct patient on fluid and nutrition as appropriate  Outcome: Progressing  Goal: Fluid balance maintained  Description  INTERVENTIONS:  - Monitor labs   - Monitor I/O and WT  - Instruct patient on fluid and nutrition as appropriate  - Assess for signs & symptoms of volume excess or deficit  Outcome: Progressing  Goal: Glucose maintained within target range  Description  INTERVENTIONS:  - Monitor Blood Glucose as ordered  - Assess for signs and symptoms of hyperglycemia and hypoglycemia  - Administer ordered medications to maintain glucose within target range  - Assess nutritional intake and initiate nutrition service referral as needed  Outcome: Progressing     Problem: SKIN/TISSUE INTEGRITY - ADULT  Goal: Skin integrity remains intact  Description  INTERVENTIONS  - Identify patients at risk for skin breakdown  - Assess and monitor skin integrity  - Assess and monitor nutrition and hydration status  - Monitor labs (i e  albumin)  - Assess for incontinence   - Turn and reposition patient  - Assist with mobility/ambulation  - Relieve pressure over bony prominences  - Avoid friction and shearing  - Provide appropriate hygiene as needed including keeping skin clean and dry  - Evaluate need for skin moisturizer/barrier cream  - Collaborate with interdisciplinary team (i e  Nutrition, Rehabilitation, etc )   - Patient/family teaching  Outcome: Progressing  Goal: Incision(s), wounds(s) or drain site(s) healing without S/S of infection  Description  INTERVENTIONS  - Assess and document risk factors for skin impairment   - Assess and document dressing, incision, wound bed, drain sites and surrounding tissue  - Consider nutrition services referral as needed  - Oral mucous membranes remain intact  - Provide patient/ family education  Outcome: Progressing     Problem: PAIN - ADULT  Goal: Verbalizes/displays adequate comfort level or baseline comfort level  Description  Interventions:  - Encourage patient to monitor pain and request assistance  - Assess pain using appropriate pain scale  - Administer analgesics based on type and severity of pain and evaluate response  - Implement non-pharmacological measures as appropriate and evaluate response  - Consider cultural and social influences on pain and pain management  - Notify physician/advanced practitioner if interventions unsuccessful or patient reports new pain  Outcome: Progressing     Problem: INFECTION - ADULT  Goal: Absence or prevention of progression during hospitalization  Description  INTERVENTIONS:  - Assess and monitor for signs and symptoms of infection  - Monitor lab/diagnostic results  - Monitor all insertion sites, i e  indwelling lines, tubes, and drains  - Monitor endotracheal if appropriate and nasal secretions for changes in amount and color  - [place] appropriate cooling/warming therapies per order  - Administer medications as ordered  - Instruct and encourage patient and family to use good hand hygiene technique  - Identify and instruct in appropriate isolation precautions for identified infection/condition  Outcome: Progressing     Problem: Prexisting or High Potential for Compromised Skin Integrity  Goal: Skin integrity is maintained or improved  Description  INTERVENTIONS:  - Identify patients at risk for skin breakdown  - Assess and monitor skin integrity  - Assess and monitor nutrition and hydration status  - Monitor labs   - Assess for incontinence   - Turn and reposition patient  - Assist with mobility/ambulation  - Relieve pressure over bony prominences  - Avoid friction and shearing  - Provide appropriate hygiene as needed including keeping skin clean and dry  - Evaluate need for skin moisturizer/barrier cream  - Collaborate with interdisciplinary team   - Patient/family teaching  - Consider wound care consult   Outcome: Progressing

## 2019-09-11 NOTE — PROGRESS NOTES
Progress Note - Infectious Disease   Shireen Mcmahan 63 y o  male MRN: [id number]  Unit/Bed#: E5 -01 Encounter: [id number]      Impression/Plan:  1  Sepsis   POA   Tachycardia and leukocytosis  Secondary to right foot gas gangrene and cellulitis   No other clear source appreciated   Blood cultures are negative after 72 hours   His chest x-ray was negative for acute infectious cardiopulmonary findings  Patient is now status post wound debridement as well as R 5th partial ray amp and toe fillet flap  All wound cultures showed beta-hemolytic strep group B  Fortunately the patient has remained clinically stable and nontoxic   Today he is afebrile and his WBC count remains  normalized  -antibiotic as below  -monitor CBC and BMP  -follow up blood cultures  -monitor vitals  -supportive care     2  Right foot cellulitis   With gas gangrene   Secondary to a chronic right plantar 5th toe diabetic ulcer   Patient is status post bedside debridement   He then went to the OR on 09/08/2019 for a right partial 5th ray amputation and toe fillet flap   Wound culture and intraoperative cultures  showed group B beta-hemolytic strep   Blood cultures are negative after 72 hours  Given his final culture results I recommend transitioning him from IV cefazolin to PO Keflex  He should complete a one week postop antibiotic course   -stop IV cefazolin  -start PO Keflex, 500 mg q6 hours, through 09/15/2019, for total of seven days of postop antibiotics  -monitor CBC and BMP  -monitor vitals  -serial right foot exams     3  Chronic right plantar 5th toe diabetic ulcer   Patient has been going for routine follow up at the outpatient [facility], he recently noticed blistering to the wound  [place] is now status post bedside debridement  He went for a right partial 5th ray amp and toe fillet flap on 09/08/2019   Cultures showed group B beta-hemolytic strep   Wound is surgically cured  Flap has remained stable on podiatry postop exams   -antibiotic as above  -serial right foot exams  -local wound care/flap management per Podiatry  -continue close follow-up with Podiatry     4  Uncontrolled type 2 diabetes mellitus with neuropathy   Patient's last hemoglobin A1c was 8 9% on 2019   Likely contributor to his infection above   Recommend patient committed to tighter glycemic control for overall health, especially in the setting of wound infection  Patient with multiple questions related to insulin use at home as he has not been previously doing this  He will meet with MARCO A RODRÍGUEZ today to discuss this further   -blood glucose management per primary service    Patient is stable for discharge from ID standpoint  Above plan was discussed in detail at the bedside with patient and his wife  Above plan was discussed in detail with Podiatry resident, Debra Pillai  Above plan was discussed with MARCO A RODRÍGUEZ, Conception Guiles  Antibiotics:  Cefazolin - stop  Keflex 1  Antibiotics 5  Postop 3    Subjective:  Patient reports he is feeling well today  He has no acute complaints  He denies fever, chills, sweats, shakes; no nausea, vomiting, abdominal pain, diarrhea, or dysuria; no cough, shortness of breath, or chest pain  No new symptoms  No concerns with his right foot dressing  Is eager to go home  Objective:  Vitals:  Temp:  [97 5 °F (36 4 °C)-98 °F (36 7 °C)] 98 °F (36 7 °C)  HR:  [60-70] 60  Resp:  [18-20] 20  BP: (162-176)/(80-92) 165/92  SpO2:  [96 %-97 %] 97 %  Temp (24hrs), Av 7 °F (36 5 °C), Min:97 5 °F (36 4 °C), Max:98 °F (36 7 °C)  Current: Temperature: 98 °F (36 7 °C)    Physical Exam:   General Appearance:  Alert, interactive, nontoxic, no acute distress  Patient is wearing his glasses  Throat: Oropharynx moist without lesions      Lungs:   Clear to auscultation bilaterally; no wheezes, rhonchi or rales; respirations unlabored   Heart:  RRR; no murmur, rub or gallop   Abdomen:   Soft, obese, non-tender, non-distended, positive bowel sounds  Extremities: No clubbing or cyanosis, no edema   Skin: No new rashes, lesions, or draining wounds noted on exposed skin  Right foot dressing remains intact, no breakthrough drainage     Labs, Imaging, & Other studies:   All pertinent labs and imaging studies were personally reviewed  Results from last 7 days   Lab Units 09/11/19  0517 09/10/19  0530 09/09/19  0530   WBC Thousand/uL 7 41 6 27 6 98   HEMOGLOBIN g/dL 11 1* 9 8* 9 6*   PLATELETS Thousands/uL 399* 308 267     Results from last 7 days   Lab Units 09/11/19  0517   POTASSIUM mmol/L 3 5   CHLORIDE mmol/L 104   CO2 mmol/L 29   BUN mg/dL 13   CREATININE mg/dL 0 94   EGFR ml/min/1 73sq m 89   CALCIUM mg/dL 9 1     Results from last 7 days   Lab Units 09/08/19  1256 09/07/19  2236 09/07/19  2228 09/07/19  1951   BLOOD CULTURE   --  No Growth at 72 hrs   --  No Growth at 48 hrs     GRAM STAIN RESULT  No polys seen*  2+ Gram positive cocci in pairs*  --  1+ Polys*  2+ Gram positive cocci in pairs*  --    WOUND CULTURE  3+ Growth of Beta Hemolytic Streptococcus Group B*  2+ Growth of Staphylococcus aureus*  Few [place] of   --  4+ Growth of Beta Hemolytic Streptococcus Group B*  2+ Growth of Staphylococcus aureus*  Few Col[place] of   --

## 2019-09-11 NOTE — OCCUPATIONAL THERAPY NOTE
Occupational Therapy Screen        Patient Name: Verner Mckusick FOWYH'R Date: 9/11/2019      OT consult received  Per PT pt is MOD I w/ ADLs and MOD I functional transfers and mobility w/ RW and able to maintain NWB on R LE  Pt w/ no inpt OT services warranted at this time  D/C OT      Jony Dozier MS, OTR/L

## 2019-09-11 NOTE — PROGRESS NOTES
Educated patient on insulin use  Patient demonstrated drawing up insulin and self administration   Will continue to follow

## 2019-09-11 NOTE — PLAN OF CARE
Problem: Nutrition/Hydration-ADULT  Goal: Nutrient/Hydration intake appropriate for improving, restoring or maintaining nutritional needs  Description  Monitor and assess patient's nutrition/hydration status for malnutrition  Collaborate with interdisciplinary team and initiate plan and interventions as ordered  Monitor patient's weight and dietary intake as ordered or per policy  Utilize nutrition screening tool and intervene as necessary  Determine patient's food preferences and provide high-protein, high-caloric foods as appropriate  INTERVENTIONS:  - Monitor oral intake, urinary output, labs, and treatment plans  - Assess nutrition and hydration status and recommend course of action  - Evaluate amount of meals eaten  - Assist patient with eating if necessary   - Allow adequate time for meals  - Recommend/ encourage appropriate diets, oral nutritional supplements, and vitamin/mineral supplements  - Order, calculate, and assess calorie counts as needed  - Recommend, monitor, and adjust tube feedings and TPN/PPN based on assessed needs  - Assess need for intravenous fluids  - Provide specific nutrition/hydration education as appropriate  - Include patient/family/caregiver in decisions related to nutrition  Outcome: Progressing     Problem: Potential for Falls  Goal: Patient will remain free of falls  Description  INTERVENTIONS:  - Assess patient frequently for physical needs  -  Identify cognitive and physical deficits and behaviors that affect risk of falls    -  [place] fall precautions as indicated by assessment   - Educate patient/family on patient safety including physical limitations  - Instruct patient to call for assistance with activity based on assessment  - Modify environment to reduce risk of injury  - Consider OT/PT consult to assist with strengthening/mobility  Outcome: Progressing     Problem: METABOLIC, FLUID AND ELECTROLYTES - ADULT  Goal: Electrolytes maintained within normal limits  Description  INTERVENTIONS:  - Monitor labs and assess patient for signs and symptoms of electrolyte imbalances  - Administer electrolyte replacement as ordered  - Monitor response to electrolyte replacements, including repeat lab results as appropriate  - Instruct patient on fluid and nutrition as appropriate  Outcome: Progressing  Goal: Fluid balance maintained  Description  INTERVENTIONS:  - Monitor labs   - Monitor I/O and WT  - Instruct patient on fluid and nutrition as appropriate  - Assess for signs & symptoms of volume excess or deficit  Outcome: Progressing  Goal: Glucose maintained within target range  Description  INTERVENTIONS:  - Monitor Blood Glucose as ordered  - Assess for signs and symptoms of hyperglycemia and hypoglycemia  - Administer ordered medications to maintain glucose within target range  - Assess nutritional intake and initiate nutrition service referral as needed  Outcome: Progressing     Problem: SKIN/TISSUE INTEGRITY - ADULT  Goal: Skin integrity remains intact  Description  INTERVENTIONS  - Identify patients at risk for skin breakdown  - Assess and monitor skin integrity  - Assess and monitor nutrition and hydration status  - Monitor labs (i e  albumin)  - Assess for incontinence   - Turn and reposition patient  - Assist with mobility/ambulation  - Relieve pressure over bony prominences  - Avoid friction and shearing  - Provide appropriate hygiene as needed including keeping skin clean and dry  - Evaluate need for skin moisturizer/barrier cream  - Collaborate with interdisciplinary team (i e  Nutrition, Rehabilitation, etc )   - Patient/family teaching  Outcome: Progressing  Goal: Incision(s), wounds(s) or drain site(s) healing without S/S of infection  Description  INTERVENTIONS  - Assess and document risk factors for skin impairment   - Assess and document dressing, incision, wound bed, drain sites and surrounding tissue  - Consider nutrition services referral as needed  - Oral mucous membranes remain intact  - Provide patient/ family education  Outcome: Progressing     Problem: PAIN - ADULT  Goal: Verbalizes/displays adequate comfort level or baseline comfort level  Description  Interventions:  - Encourage patient to monitor pain and request assistance  - Assess pain using appropriate pain scale  - Administer analgesics based on type and severity of pain and evaluate response  - Implement non-pharmacological measures as appropriate and evaluate response  - Consider cultural and social influences on pain and pain management  - Notify physician/advanced practitioner if interventions unsuccessful or patient reports new pain  Outcome: Progressing     Problem: INFECTION - ADULT  Goal: Absence or prevention of progression during hospitalization  Description  INTERVENTIONS:  - Assess and monitor for signs and symptoms of infection  - Monitor lab/diagnostic results  - Monitor all insertion sites, i e  indwelling lines, tubes, and drains  - Monitor endotracheal if appropriate and nasal secretions for changes in amount and color  - [place] appropriate cooling/warming therapies per order  - Administer medications as ordered  - Instruct and encourage patient and family to use good hand hygiene technique  - Identify and instruct in appropriate isolation precautions for identified infection/condition  Outcome: Progressing     Problem: Prexisting or High Potential for Compromised Skin Integrity  Goal: Skin integrity is maintained or improved  Description  INTERVENTIONS:  - Identify patients at risk for skin breakdown  - Assess and monitor skin integrity  - Assess and monitor nutrition and hydration status  - Monitor labs   - Assess for incontinence   - Turn and reposition patient  - Assist with mobility/ambulation  - Relieve pressure over bony prominences  - Avoid friction and shearing  - Provide appropriate hygiene as needed including keeping skin clean and dry  - Evaluate need for skin moisturizer/barrier cream  - Collaborate with interdisciplinary team   - Patient/family teaching  - Consider wound care consult   Outcome: Progressing

## 2019-09-12 NOTE — PROGRESS NOTES
Upon chart review, patient was sepsis on admission with elevated white count of 15 24, tachycardic with right foot infection gas gangrene

## 2019-09-13 LAB
BACTERIA BLD CULT: NORMAL
BACTERIA BLD CULT: NORMAL

## 2019-09-17 ENCOUNTER — APPOINTMENT (OUTPATIENT)
Dept: WOUND CARE | Facility: HOSPITAL | Age: 59
End: 2019-09-17
Payer: COMMERCIAL

## 2019-09-17 PROCEDURE — G0463 HOSPITAL OUTPT CLINIC VISIT: HCPCS

## 2019-09-17 PROCEDURE — 99213 OFFICE O/P EST LOW 20 MIN: CPT

## 2019-09-24 ENCOUNTER — APPOINTMENT (OUTPATIENT)
Dept: WOUND CARE | Facility: HOSPITAL | Age: 59
End: 2019-09-24
Payer: COMMERCIAL

## 2019-09-24 DIAGNOSIS — E11.42 TYPE 2 DIABETES MELLITUS WITH DIABETIC POLYNEUROPATHY, WITHOUT LONG-TERM CURRENT USE OF INSULIN (HCC): ICD-10-CM

## 2019-09-24 PROCEDURE — 99213 OFFICE O/P EST LOW 20 MIN: CPT

## 2019-09-24 PROCEDURE — G0463 HOSPITAL OUTPT CLINIC VISIT: HCPCS

## 2019-09-27 RX ORDER — INSULIN GLARGINE 100 [IU]/ML
INJECTION, SOLUTION SUBCUTANEOUS
Qty: 10 ML | Refills: 0 | OUTPATIENT
Start: 2019-09-27

## 2019-10-01 ENCOUNTER — APPOINTMENT (OUTPATIENT)
Dept: WOUND CARE | Facility: HOSPITAL | Age: 59
End: 2019-10-01
Payer: COMMERCIAL

## 2019-10-01 PROCEDURE — 11042 DBRDMT SUBQ TIS 1ST 20SQCM/<: CPT

## 2019-10-05 DIAGNOSIS — E11.42 TYPE 2 DIABETES MELLITUS WITH DIABETIC POLYNEUROPATHY, WITHOUT LONG-TERM CURRENT USE OF INSULIN (HCC): ICD-10-CM

## 2019-10-07 RX ORDER — INSULIN GLARGINE 100 [IU]/ML
INJECTION, SOLUTION SUBCUTANEOUS
Qty: 10 ML | Refills: 0 | OUTPATIENT
Start: 2019-10-07

## 2019-10-08 ENCOUNTER — APPOINTMENT (OUTPATIENT)
Dept: WOUND CARE | Facility: HOSPITAL | Age: 59
End: 2019-10-08
Payer: COMMERCIAL

## 2019-10-08 PROCEDURE — 11042 DBRDMT SUBQ TIS 1ST 20SQCM/<: CPT

## 2019-10-22 ENCOUNTER — APPOINTMENT (OUTPATIENT)
Dept: WOUND CARE | Facility: HOSPITAL | Age: 59
End: 2019-10-22
Payer: COMMERCIAL

## 2019-10-22 PROCEDURE — 11042 DBRDMT SUBQ TIS 1ST 20SQCM/<: CPT

## 2019-10-25 RX ORDER — SYRING-NEEDL,DISP,INSUL,0.3 ML 31GX15/64"
SYRINGE, EMPTY DISPOSABLE MISCELLANEOUS
Refills: 0 | OUTPATIENT
Start: 2019-10-25

## 2019-10-29 ENCOUNTER — APPOINTMENT (OUTPATIENT)
Dept: WOUND CARE | Facility: HOSPITAL | Age: 59
End: 2019-10-29
Payer: COMMERCIAL

## 2019-10-29 PROCEDURE — 11042 DBRDMT SUBQ TIS 1ST 20SQCM/<: CPT

## 2019-11-05 ENCOUNTER — APPOINTMENT (OUTPATIENT)
Dept: WOUND CARE | Facility: HOSPITAL | Age: 59
End: 2019-11-05
Payer: COMMERCIAL

## 2019-11-05 PROCEDURE — 11042 DBRDMT SUBQ TIS 1ST 20SQCM/<: CPT

## 2020-04-08 ENCOUNTER — OFFICE VISIT (OUTPATIENT)
Dept: URGENT CARE | Facility: MEDICAL CENTER | Age: 60
End: 2020-04-08
Payer: COMMERCIAL

## 2020-04-08 VITALS
WEIGHT: 199 LBS | OXYGEN SATURATION: 97 % | BODY MASS INDEX: 31.23 KG/M2 | DIASTOLIC BLOOD PRESSURE: 78 MMHG | TEMPERATURE: 99.3 F | SYSTOLIC BLOOD PRESSURE: 122 MMHG | HEIGHT: 67 IN | RESPIRATION RATE: 18 BRPM | HEART RATE: 98 BPM

## 2020-04-08 DIAGNOSIS — R05.9 COUGH: Primary | ICD-10-CM

## 2020-04-08 PROCEDURE — 87635 SARS-COV-2 COVID-19 AMP PRB: CPT | Performed by: PHYSICIAN ASSISTANT

## 2020-04-08 PROCEDURE — 99213 OFFICE O/P EST LOW 20 MIN: CPT | Performed by: PHYSICIAN ASSISTANT

## 2020-04-08 RX ORDER — INSULIN GLARGINE 100 [IU]/ML
36 INJECTION, SOLUTION SUBCUTANEOUS
COMMUNITY
Start: 2020-03-07

## 2020-04-10 LAB — SARS-COV-2 RNA SPEC QL NAA+PROBE: NOT DETECTED

## 2020-04-13 ENCOUNTER — TELEPHONE (OUTPATIENT)
Dept: URGENT CARE | Facility: MEDICAL CENTER | Age: 60
End: 2020-04-13

## 2021-09-22 ENCOUNTER — APPOINTMENT (EMERGENCY)
Dept: RADIOLOGY | Facility: HOSPITAL | Age: 61
DRG: 281 | End: 2021-09-22
Payer: COMMERCIAL

## 2021-09-22 ENCOUNTER — HOSPITAL ENCOUNTER (INPATIENT)
Facility: HOSPITAL | Age: 61
LOS: 1 days | DRG: 281 | End: 2021-09-23
Attending: EMERGENCY MEDICINE | Admitting: INTERNAL MEDICINE
Payer: COMMERCIAL

## 2021-09-22 DIAGNOSIS — R77.8 ELEVATED TROPONIN: ICD-10-CM

## 2021-09-22 DIAGNOSIS — R06.00 DYSPNEA ON EXERTION: Primary | ICD-10-CM

## 2021-09-22 PROBLEM — I24.9 ACS (ACUTE CORONARY SYNDROME) (HCC): Status: ACTIVE | Noted: 2021-09-22

## 2021-09-22 PROBLEM — E78.5 HYPERLIPIDEMIA: Status: ACTIVE | Noted: 2021-09-22

## 2021-09-22 LAB
ALBUMIN SERPL BCP-MCNC: 4 G/DL (ref 3.5–5)
ALP SERPL-CCNC: 84 U/L (ref 46–116)
ALT SERPL W P-5'-P-CCNC: 30 U/L (ref 12–78)
ANION GAP SERPL CALCULATED.3IONS-SCNC: 10 MMOL/L (ref 4–13)
APTT PPP: 31 SECONDS (ref 23–37)
AST SERPL W P-5'-P-CCNC: 25 U/L (ref 5–45)
ATRIAL RATE: 84 BPM
BASOPHILS # BLD AUTO: 0.07 THOUSANDS/ΜL (ref 0–0.1)
BASOPHILS NFR BLD AUTO: 1 % (ref 0–1)
BILIRUB SERPL-MCNC: 0.67 MG/DL (ref 0.2–1)
BUN SERPL-MCNC: 20 MG/DL (ref 5–25)
CALCIUM SERPL-MCNC: 9 MG/DL (ref 8.3–10.1)
CHLORIDE SERPL-SCNC: 105 MMOL/L (ref 100–108)
CO2 SERPL-SCNC: 25 MMOL/L (ref 21–32)
CREAT SERPL-MCNC: 1 MG/DL (ref 0.6–1.3)
EOSINOPHIL # BLD AUTO: 0.15 THOUSAND/ΜL (ref 0–0.61)
EOSINOPHIL NFR BLD AUTO: 2 % (ref 0–6)
ERYTHROCYTE [DISTWIDTH] IN BLOOD BY AUTOMATED COUNT: 12.8 % (ref 11.6–15.1)
GFR SERPL CREATININE-BSD FRML MDRD: 81 ML/MIN/1.73SQ M
GLUCOSE SERPL-MCNC: 99 MG/DL (ref 65–140)
GLUCOSE SERPL-MCNC: 99 MG/DL (ref 65–140)
HCT VFR BLD AUTO: 39.4 % (ref 36.5–49.3)
HGB BLD-MCNC: 13.1 G/DL (ref 12–17)
IMM GRANULOCYTES # BLD AUTO: 0.03 THOUSAND/UL (ref 0–0.2)
IMM GRANULOCYTES NFR BLD AUTO: 0 % (ref 0–2)
LYMPHOCYTES # BLD AUTO: 1.57 THOUSANDS/ΜL (ref 0.6–4.47)
LYMPHOCYTES NFR BLD AUTO: 21 % (ref 14–44)
MCH RBC QN AUTO: 29.4 PG (ref 26.8–34.3)
MCHC RBC AUTO-ENTMCNC: 33.2 G/DL (ref 31.4–37.4)
MCV RBC AUTO: 89 FL (ref 82–98)
MONOCYTES # BLD AUTO: 0.72 THOUSAND/ΜL (ref 0.17–1.22)
MONOCYTES NFR BLD AUTO: 10 % (ref 4–12)
NEUTROPHILS # BLD AUTO: 4.93 THOUSANDS/ΜL (ref 1.85–7.62)
NEUTS SEG NFR BLD AUTO: 66 % (ref 43–75)
NRBC BLD AUTO-RTO: 0 /100 WBCS
NT-PROBNP SERPL-MCNC: 2025 PG/ML
P AXIS: 56 DEGREES
PLATELET # BLD AUTO: 287 THOUSANDS/UL (ref 149–390)
PMV BLD AUTO: 9.7 FL (ref 8.9–12.7)
POTASSIUM SERPL-SCNC: 4.3 MMOL/L (ref 3.5–5.3)
PR INTERVAL: 158 MS
PROT SERPL-MCNC: 7.5 G/DL (ref 6.4–8.2)
QRS AXIS: -16 DEGREES
QRSD INTERVAL: 84 MS
QT INTERVAL: 368 MS
QTC INTERVAL: 434 MS
RBC # BLD AUTO: 4.45 MILLION/UL (ref 3.88–5.62)
SARS-COV-2 RNA RESP QL NAA+PROBE: NEGATIVE
SODIUM SERPL-SCNC: 140 MMOL/L (ref 136–145)
T WAVE AXIS: 98 DEGREES
TROPONIN I SERPL-MCNC: 1.66 NG/ML
TROPONIN I SERPL-MCNC: 1.7 NG/ML
VENTRICULAR RATE: 84 BPM
WBC # BLD AUTO: 7.47 THOUSAND/UL (ref 4.31–10.16)

## 2021-09-22 PROCEDURE — 93005 ELECTROCARDIOGRAM TRACING: CPT

## 2021-09-22 PROCEDURE — 85730 THROMBOPLASTIN TIME PARTIAL: CPT | Performed by: INTERNAL MEDICINE

## 2021-09-22 PROCEDURE — 85025 COMPLETE CBC W/AUTO DIFF WBC: CPT | Performed by: EMERGENCY MEDICINE

## 2021-09-22 PROCEDURE — 36415 COLL VENOUS BLD VENIPUNCTURE: CPT | Performed by: EMERGENCY MEDICINE

## 2021-09-22 PROCEDURE — 99223 1ST HOSP IP/OBS HIGH 75: CPT | Performed by: INTERNAL MEDICINE

## 2021-09-22 PROCEDURE — 84484 ASSAY OF TROPONIN QUANT: CPT | Performed by: EMERGENCY MEDICINE

## 2021-09-22 PROCEDURE — 82948 REAGENT STRIP/BLOOD GLUCOSE: CPT

## 2021-09-22 PROCEDURE — U0005 INFEC AGEN DETEC AMPLI PROBE: HCPCS | Performed by: EMERGENCY MEDICINE

## 2021-09-22 PROCEDURE — 83880 ASSAY OF NATRIURETIC PEPTIDE: CPT | Performed by: INTERNAL MEDICINE

## 2021-09-22 PROCEDURE — 93010 ELECTROCARDIOGRAM REPORT: CPT | Performed by: INTERNAL MEDICINE

## 2021-09-22 PROCEDURE — 80053 COMPREHEN METABOLIC PANEL: CPT | Performed by: EMERGENCY MEDICINE

## 2021-09-22 PROCEDURE — 99285 EMERGENCY DEPT VISIT HI MDM: CPT | Performed by: EMERGENCY MEDICINE

## 2021-09-22 PROCEDURE — U0003 INFECTIOUS AGENT DETECTION BY NUCLEIC ACID (DNA OR RNA); SEVERE ACUTE RESPIRATORY SYNDROME CORONAVIRUS 2 (SARS-COV-2) (CORONAVIRUS DISEASE [COVID-19]), AMPLIFIED PROBE TECHNIQUE, MAKING USE OF HIGH THROUGHPUT TECHNOLOGIES AS DESCRIBED BY CMS-2020-01-R: HCPCS | Performed by: EMERGENCY MEDICINE

## 2021-09-22 PROCEDURE — 99285 EMERGENCY DEPT VISIT HI MDM: CPT

## 2021-09-22 PROCEDURE — 71045 X-RAY EXAM CHEST 1 VIEW: CPT

## 2021-09-22 RX ORDER — HEPARIN SODIUM 10000 [USP'U]/100ML
3-20 INJECTION, SOLUTION INTRAVENOUS
Status: DISCONTINUED | OUTPATIENT
Start: 2021-09-22 | End: 2021-09-23 | Stop reason: HOSPADM

## 2021-09-22 RX ORDER — HEPARIN SODIUM 1000 [USP'U]/ML
4000 INJECTION, SOLUTION INTRAVENOUS; SUBCUTANEOUS ONCE
Status: COMPLETED | OUTPATIENT
Start: 2021-09-22 | End: 2021-09-22

## 2021-09-22 RX ORDER — ONDANSETRON 2 MG/ML
4 INJECTION INTRAMUSCULAR; INTRAVENOUS EVERY 6 HOURS PRN
Status: DISCONTINUED | OUTPATIENT
Start: 2021-09-22 | End: 2021-09-23 | Stop reason: HOSPADM

## 2021-09-22 RX ORDER — ASPIRIN 81 MG/1
81 TABLET ORAL DAILY
Status: DISCONTINUED | OUTPATIENT
Start: 2021-09-23 | End: 2021-09-23 | Stop reason: HOSPADM

## 2021-09-22 RX ORDER — LOSARTAN POTASSIUM 50 MG/1
100 TABLET ORAL DAILY
Status: DISCONTINUED | OUTPATIENT
Start: 2021-09-23 | End: 2021-09-23 | Stop reason: HOSPADM

## 2021-09-22 RX ORDER — OXYCODONE HYDROCHLORIDE 5 MG/1
5 TABLET ORAL EVERY 4 HOURS PRN
Status: DISCONTINUED | OUTPATIENT
Start: 2021-09-22 | End: 2021-09-23 | Stop reason: HOSPADM

## 2021-09-22 RX ORDER — HEPARIN SODIUM 1000 [USP'U]/ML
4000 INJECTION, SOLUTION INTRAVENOUS; SUBCUTANEOUS
Status: DISCONTINUED | OUTPATIENT
Start: 2021-09-22 | End: 2021-09-23 | Stop reason: HOSPADM

## 2021-09-22 RX ORDER — ATORVASTATIN CALCIUM 40 MG/1
40 TABLET, FILM COATED ORAL EVERY EVENING
Status: DISCONTINUED | OUTPATIENT
Start: 2021-09-22 | End: 2021-09-23

## 2021-09-22 RX ORDER — NITROGLYCERIN 0.4 MG/1
0.4 TABLET SUBLINGUAL
Status: DISCONTINUED | OUTPATIENT
Start: 2021-09-22 | End: 2021-09-23 | Stop reason: HOSPADM

## 2021-09-22 RX ORDER — INSULIN GLARGINE 100 [IU]/ML
22 INJECTION, SOLUTION SUBCUTANEOUS
Status: DISCONTINUED | OUTPATIENT
Start: 2021-09-22 | End: 2021-09-23 | Stop reason: HOSPADM

## 2021-09-22 RX ORDER — ACETAMINOPHEN 325 MG/1
650 TABLET ORAL EVERY 6 HOURS PRN
Status: DISCONTINUED | OUTPATIENT
Start: 2021-09-22 | End: 2021-09-23 | Stop reason: HOSPADM

## 2021-09-22 RX ORDER — HEPARIN SODIUM 1000 [USP'U]/ML
2000 INJECTION, SOLUTION INTRAVENOUS; SUBCUTANEOUS
Status: DISCONTINUED | OUTPATIENT
Start: 2021-09-22 | End: 2021-09-23 | Stop reason: HOSPADM

## 2021-09-22 RX ADMIN — HEPARIN SODIUM 11.1 UNITS/KG/HR: 10000 INJECTION, SOLUTION INTRAVENOUS at 20:18

## 2021-09-22 RX ADMIN — HEPARIN SODIUM 4000 UNITS: 1000 INJECTION INTRAVENOUS; SUBCUTANEOUS at 20:16

## 2021-09-22 RX ADMIN — TICAGRELOR 180 MG: 90 TABLET ORAL at 20:15

## 2021-09-22 NOTE — ED PROVIDER NOTES
History  Chief Complaint   Patient presents with    Chest Pain     patient c/o having a "chest cold" for a few weeks and now c/o increased CP and SOB with exertion  denies COVID exposure  80-year-old male history of hypertension diabetes, presenting due to dyspnea with exertion  Patient states for the past 3 weeks he has had cold-like symptoms"  Says he had some nasal congestion intermittent coughing the last weeks  Says over the past 2 days he has had significant dyspnea with exertion  No known sick contacts  Denies any current fever chills, chest pain, dyspnea rest, nausea or vomiting, abdominal pain, urinary bowel symptoms, swelling in extremities  Prior to Admission Medications   Prescriptions Last Dose Informant Patient Reported? Taking? LANTUS SOLOSTAR 100 units/mL injection pen 2021 at Unknown time Self Yes Yes   Si Units daily at bedtime    cholecalciferol (VITAMIN D3) 1,000 units tablet 2021 at Unknown time  Yes Yes   Sig: Take 5,000 Units by mouth daily   losartan-hydrochlorothiazide (HYZAAR) 100-25 MG per tablet 2021 at Unknown time  Yes Yes   Sig: Take 1 tablet by mouth daily    metFORMIN (GLUCOPHAGE) 1000 MG tablet 2021 at Unknown time  Yes Yes   Sig: Take 1,000 mg by mouth 2 (two) times a day with meals   rosuvastatin (CRESTOR) 5 mg tablet 2021 at Unknown time Self Yes Yes   Sig: Take 5 mg by mouth MWF three times a week      Facility-Administered Medications: None       Past Medical History:   Diagnosis Date    Diabetes mellitus ([address] )     Hypertension        Past Surgical History:   Procedure Laterality Date    CARDIAC CATHETERIZATION  2021    INCISION AND DRAINAGE OF WOUND Right 2019    Procedure: INCISION AND DRAINAGE (I&D) EXTREMITY;  Surgeon: Rene Pastor DPM;  Location: [facility];  Service: Podiatry       History reviewed  No pertinent family history    I have reviewed and agree with the history as documented  E-Cigarette/Vaping     E-Cigarette/Vaping Substances     Social History     Tobacco Use    Smoking status: Never Smoker    Smokeless tobacco: Never Used   Substance Use Topics    Alcohol use: Yes     Alcohol/week: 30 0 standard drinks     Types: 30 Cans of beer per week     Comment: 5-6 beers 4-5 x per week     Drug use: Never        Review of Systems   Constitutional: Negative for chills and fever  HENT: Positive for rhinorrhea  Negative for ear pain and sore throat  Eyes: Negative for visual disturbance  Respiratory: Positive for shortness of breath  Negative for cough and chest tightness  Cardiovascular: Negative for chest pain and palpitations  Gastrointestinal: Negative for abdominal pain and vomiting  Genitourinary: Negative for dysuria  Musculoskeletal: Negative for arthralgias and back pain  Skin: Negative for color change and rash  Neurological: Positive for weakness  Negative for syncope  All other systems reviewed and are negative  Physical Exam  ED Triage Vitals   Temperature Pulse Respirations Blood Pressure SpO2   09/22/21 1524 09/22/21 1522 09/22/21 1522 09/22/21 1522 09/22/21 1522   98 3 °F (36 8 °C) 84 18 (!) 176/88 97 %      Temp Source Heart Rate Source Patient Position - Orthostatic VS BP Location FiO2 (%)   09/22/21 1524 09/22/21 1737 09/22/21 1522 09/22/21 1522 --   Oral Monitor Sitting Right arm       Pain Score       09/22/21 1522       2             Orthostatic Vital Signs  Vitals:    09/23/21 1300 09/23/21 1400 09/23/21 1506 09/23/21 1600   BP: 122/60 139/70 154/70 (!) 181/80   Pulse: 65 65 61 72   Patient Position - Orthostatic VS: Lying Sitting Lying Lying       Physical Exam  Vitals and nursing note reviewed  Constitutional:       Appearance: He is well-developed  HENT:      Head: Normocephalic and atraumatic  Eyes:      Conjunctiva/sclera: Conjunctivae normal    Cardiovascular:      Rate and Rhythm: Normal rate and regular rhythm  Heart sounds: No murmur heard  Pulmonary:      Effort: Pulmonary effort is normal  No respiratory distress  Breath sounds: Normal breath sounds  Abdominal:      Palpations: Abdomen is soft  Tenderness: There is no abdominal tenderness  Musculoskeletal:      Cervical back: Neck supple  Skin:     General: Skin is warm and dry  Neurological:      General: No focal deficit present  Mental Status: He is alert and oriented to person, place, and time     Psychiatric:         Mood and Affect: Mood normal          Behavior: Behavior normal          ED Medications  Medications   ticagrelor (BRILINTA) tablet 180 mg (180 mg Oral Given 9/22/21 2015)   heparin (porcine) injection 4,000 Units (4,000 Units Intravenous Given 9/22/21 2016)   fentanyl citrate (PF) 100 MCG/2ML (50 mcg Intravenous Given 9/23/21 0939)   midazolam (VERSED) injection (1 mg Intravenous Given 9/23/21 0939)   lidocaine (PF) (XYLOCAINE-MPF) 2 % injection (2 mL Infiltration Given 9/23/21 0924)   heparin (porcine) injection (4,000 Units Intravenous Given 9/23/21 0927)   nitroGLYcerin (TRIDIL) 50 mg in 250 mL infusion (premix) (200 mcg Intra-arterial Given 9/23/21 0927)   verapamil (ISOPTIN) injection (2 5 mg  Given 9/23/21 0927)   iohexol (OMNIPAQUE) 350 MG/ML injection (SINGLE-DOSE) (86 mL Intravenous Given 9/23/21 0949)       Diagnostic Studies  Results Reviewed     Procedure Component Value Units Date/Time    NT-BNP PRO [[id number]]  (Abnormal) Collected: 09/22/21 1716    Lab Status: Final result Specimen: Blood from Arm, Right Updated: 09/22/21 2039     NT-proBNP 2,025 pg/mL     Troponin I [[id number]]  (Abnormal) Collected: 09/22/21 2012    Lab Status: Final result Specimen: Blood from Arm, Right Updated: 09/22/21 2035     Troponin I 1 70 ng/mL     APTT six (6) hours after Heparin bolus/drip initiation or dosing change [[id number]]  (Normal) Collected: 09/22/21 2012    Lab Status: Final result Specimen: Blood from Arm, Right Updated: 09/22/21 2035     PTT 31 seconds     Novel Coronavirus (Covid-19),PCR [facility] [[id number]]  (Normal) Collected: 09/22/21 1707    Lab Status: Final result Specimen: Nares from Nasopharyngeal Swab Updated: 09/22/21 1804     SARS-CoV-2 Negative    Narrative:      FOR PEDIATRIC PATIENTS - copy/paste COVID Guidelines URL to browser: [email or web address]    The specimen collection materials, transport medium, and/or testing methodology utilized in the production of these test results have been proven to be reliable in a limited validation with an abbreviated program under the Emergency Utilization Authorization provided by the FDA  Testing reported as "Presumptive positive" will be confirmed with secondary testing to ensure result accuracy  Clinical caution and judgement should be used with the interpretation of these results with consideration of the clinical impression and other laboratory testing  Testing reported as "Positive" or "Negative" has been proven to be accurate according to standard laboratory validation requirements  All testing is performed with control materials showing appropriate reactivity at standard intervals      Troponin I [[id number]]  (Abnormal) Collected: 09/22/21 1716    Lab Status: Final result Specimen: Blood from Arm, Right Updated: 09/22/21 1739     Troponin I 1 66 ng/mL     Comprehensive metabolic panel [[id number]] Collected: 09/22/21 1716    Lab Status: Final result Specimen: Blood from Arm, Right Updated: 09/22/21 1737     Sodium 140 mmol/L      Potassium 4 3 mmol/L      Chloride 105 mmol/L      CO2 25 mmol/L      ANION GAP 10 mmol/L      BUN 20 mg/dL      Creatinine 1 00 mg/dL      Glucose 99 mg/dL      Calcium 9 0 mg/dL      AST 25 U/L      ALT 30 U/L      Alkaline Phosphatase 84 U/L      Total Protein 7 5 g/dL      Albumin 4 0 g/dL      Total Bilirubin 0 67 mg/dL      eGFR 81 ml/min/1 73sq m     Narrative:      National Kidney Disease Foundation guidelines for Chronic Kidney Disease (CKD):     Stage 1 with normal or high GFR (GFR > 90 mL/min/1 73 square meters)    Stage 2 Mild CKD (GFR = 60-89 mL/min/1 73 square meters)    Stage 3A Moderate CKD (GFR = 45-59 mL/min/1 73 square meters)    Stage 3B Moderate CKD (GFR = 30-44 mL/min/1 73 square meters)    Stage 4 Severe CKD (GFR = 15-29 mL/min/1 73 square meters)    Stage 5 End Stage CKD (GFR <15 mL/min/1 73 square meters)  Note: GFR calculation is accurate only with a steady state creatinine    CBC and differential [[id number]] Collected: 09/22/21 1716    Lab Status: Final result Specimen: Blood from Arm, Right Updated: 09/22/21 1722     WBC 7 47 Thousand/uL      RBC 4 45 Million/uL      Hemoglobin 13 1 g/dL      Hematocrit 39 4 %      MCV 89 fL      MCH 29 4 pg      MCHC 33 2 g/dL      RDW 12 8 %      MPV 9 7 fL      Platelets 368 Thousands/uL      nRBC 0 /100 WBCs      Neutrophils Relative 66 %      Immat GRANS % 0 %      Lymphocytes Relative 21 %      Monocytes Relative 10 %      Eosinophils Relative 2 %      Basophils Relative 1 %      Neutrophils Absolute 4 93 Thousands/µL      Immature Grans Absolute 0 03 Thousand/uL      Lymphocytes Absolute 1 57 Thousands/µL      Monocytes Absolute 0 72 Thousand/µL      Eosinophils Absolute 0 15 Thousand/µL      Basophils Absolute 0 07 Thousands/µL                  XR chest 1 view portable   Final Result by Sandra Mcmullen DO (09/23 0565)      No acute cardiopulmonary disease                    Workstation performed: [id number]               Procedures  Procedures      ED Course  ED Course as of Sep 24 0727   Wed Sep 22, 2021   1756 Troponin I(!): 1 66   1857 Procedure Note: EKG  Date/Time: 09/22/21 6:57 PM   Interpreted by: Cathryn Lundberg DO  Indications / Diagnosis: Dyspnea  ECG reviewed by me, the ED Provider: yes   The EKG demonstrates:  Rhythm: normal sinus rhythm BPM  Intervals: Normal FL and QT intervals  Axis: Left axis deviation  QRS/Blocks: Normal QRS  ST Changes: Nonspecific ST segments changes, TWI in I and aVL  Comparison: Compared to prior EKG performed on 9/8/2019                                   SBIRT 22yo+      Most Recent Value   SBIRT (23 yo +)   In order to provide better care to our patients, we are screening all of our patients for alcohol and drug use  Would it be okay to ask you these screening questions? No Filed at: 09/22/2021 0373                [facility]  Number of Diagnoses or Management Options  Dyspnea on exertion  Elevated troponin  Diagnosis management comments: Troponin elevated  Concerned for myocarditis versus NSTEMI on ED presentation  Discussed with hospitalist who will be initiating heparin  Patient admitted to Medicine with cardiac consultation  Stable condition throughout emergency department stay      Disposition  Final diagnoses:   Dyspnea on exertion   Elevated troponin     Time reflects when diagnosis was documented in both MDM as applicable and the Disposition within this note     Time User Action Codes Description Comment    9/22/2021  6:32 PM Vivi Hayes [R06 00] Dyspnea on exertion     9/22/2021  6:32 PM Freddy, [address] [place] [address] [R77 [id number]] Elevated troponin       ED Disposition     ED Disposition Condition Date/Time Comment    Admit Stable Wed Sep 22, 2021  6:32 PM Case was discussed with MARCO A and the patient's admission status was agreed to be Admission Status: inpatient status to the service of Dr Yakelin Carrillo           Follow-up Information    None         Discharge Medication List as of 9/23/2021  4:54 PM      CONTINUE these medications which have NOT CHANGED    Details   cholecalciferol (VITAMIN D3) 1,000 units tablet Take 5,000 Units by mouth daily, Historical Med      LANTUS SOLOSTAR 100 units/mL injection pen 36 Units daily at bedtime , Starting Sat 3/7/2020, Historical Med      losartan-hydrochlorothiazide (HYZAAR) 100-25 MG per tablet Take 1 tablet by mouth daily , Historical Med metFORMIN (GLUCOPHAGE) 1000 MG tablet Take 1,000 mg by mouth 2 (two) times a day with meals, Historical Med      rosuvastatin (CRESTOR) 5 mg tablet Take 5 mg by mouth MWF three times a week, Historical Med           No discharge procedures on file  PDMP Review     None           ED Provider  Attending physically available and evaluated Phani Davis I managed the patient along with the ED Attending      Electronically Signed by         Trevon Vickers DO  09/24/21 4077

## 2021-09-22 NOTE — APP STUDENT NOTE
[facility] STUDENT  Inpatient Progress Note for TRAINING ONLY  Not Part of Legal Medical Record       Progress Note - Jerri Kahn 62 y o  male MRN: [id number]    Unit/Bed#: ED 03 Encounter: [id number]      Assessment/Plan:  1) Elevated troponin with ST segment depression  Continue telemetry 48 hours   Heparin drip  Asprin 81 mg  Brilinta   Oxygen if symptomatic or if saturation <88%  Echocardiogram tomorrow  CBC in AM  Nitroglycerin and morphine PRN chest pain  Propanolol   Consult cardiology  2) T2DM  Insulin sliding scale   Diabetic diet  CMP in AM  3) Hyperlipidemia   Increase Crestor dose to 40 mg   4) Hypertension  Losartan HCTZ   5) Peripheral neuropathy  Noted  Subjective:   Patient is a 62year old male who presents for SOB and BOLDEN x2 days  Patient states that he is recently recovering from a 3 week long URI with symptoms of fatigue, rhinorrhea, headache, maxillary sinus pressure, BOLDEN and cough  Patient denies rash on hands, feet, or mouth or abdominal symptoms when during Patient states that his HEENT symptoms resolved five days ago but his cough and BOLDEN have not resolved and have worsened over the last 2 days  Patient states his typical routine is extremely active and includes cutting wood for his house and working as a   Patient lives in a one story home with his wife  Review of Systems   Constitutional: Positive for malaise/fatigue  Negative for chills, decreased appetite, diaphoresis, fever, night sweats, weight gain and weight loss  HENT: Negative for congestion, ear discharge, ear pain, hoarse voice and sore throat  Denies tooth pain   Eyes: Negative for discharge  Cardiovascular: Positive for dyspnea on exertion  Negative for leg swelling, near-syncope, orthopnea, palpitations, paroxysmal nocturnal dyspnea and syncope  Respiratory: Positive for cough  Negative for hemoptysis and sputum production  Endocrine: Negative for polydipsia and polyuria     Hematologic/Lymphatic: Negative for adenopathy  Skin: Negative for color change, flushing, rash and suspicious lesions  Musculoskeletal: Negative for back pain, joint pain, muscle cramps, muscle weakness and myalgias  Denies left arm pain   Gastrointestinal: Negative for bloating, abdominal pain, change in bowel habit, constipation, diarrhea, nausea and vomiting  Genitourinary: Negative for dysuria and nocturia  Neurological: Positive for numbness  Hx of diabetic neuropathy     Objective:     Vitals: Blood pressure 157/94, pulse 76, temperature 98 3 °F (36 8 °C), temperature source Oral, resp  rate 18, weight 95 kg (209 lb 7 oz), SpO2 93 %  ,Body mass index is 32 8 kg/m²  Physical Exam  Constitutional:       Appearance: Normal appearance  HENT:      Head: Normocephalic and atraumatic  Right Ear: Tympanic membrane, ear canal and external ear normal  There is no impacted cerumen  Left Ear: Tympanic membrane, ear canal and external ear normal  There is no impacted cerumen  Nose: Nose normal  No congestion or rhinorrhea  Mouth/Throat:      Mouth: Mucous membranes are moist       Pharynx: No oropharyngeal exudate or posterior oropharyngeal erythema  Eyes:      Conjunctiva/sclera: Conjunctivae normal    Cardiovascular:      Rate and Rhythm: Normal rate and regular rhythm  Pulses: Normal pulses  Heart sounds: Normal heart sounds  No murmur heard  No friction rub  No gallop  Pulmonary:      Effort: Pulmonary effort is normal  No respiratory distress  Breath sounds: Normal breath sounds  No wheezing or rhonchi  Chest:      Chest wall: No tenderness  Abdominal:      General: Bowel sounds are normal    Musculoskeletal:         General: Normal range of motion  Cervical back: Normal range of motion  No rigidity        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)  Lymphadenopathy:      Head:      Right side of head: No submandibular, preauricular or posterior auricular adenopathy  Left side of head: No submandibular, preauricular or posterior auricular adenopathy  Cervical: No cervical adenopathy  Skin:     General: Skin is warm  Neurological:      Mental Status: He is alert  Sensory: No sensory deficit        Comments: Hx of diabetic neuropathy   Psychiatric:         Mood and Affect: Mood normal          No intake or output data in the 24 hours ending 09/22/21 1939      Invasive Devices     Peripheral Intravenous Line            Peripheral IV 09/22/21 Right Antecubital <1 day

## 2021-09-22 NOTE — CONSULTS
ENCOUNTER DATE: 09/22/21 7:24 PM  PATIENT NAME: Faye Friday 1960    [id number]  Age: 62 y o  Sex: male  [address]: Claudia Oneal MD  INPATIENT ATTENDING PHYSICIAN: Ramadneep Kiran DO; PRIMARYCARE PHYSICIAN: Gabrielle Preston DO  DATE OF ADMISSION: 9/22/2021  4:39 PM; LENGTH OF STAY: 0 days  *-*-*-*-*-*-*-*-*-*-*-*-*-*-*-*-*-*-*-*-*-*-*-*-*-*-*-*-*-*-*-*-*-*-*-*-*-*-*-*-*-*-*-*-*-*-*-*-*-*-*-*-*-*-   REASON FOR CONSULTATION:    chest pain and shortness of breath, elevated troponin  *-*-*-*-*-*-*-*-*-*-*-*-*-*-*-*-*-*-*-*-*-*-*-*-*-*-*-*-*-*-*-*-*-*-*-*-*-*-*-*-*-*-*-*-*-*-*-*-*-*-*-*-*-*-  CARDIAC ASSESSMENT:     1  Suspected type 1 acute coronary syndrome-  Non ST elevation MI  2  Diabetes mellitus, on insulin therapy, last A1c 6 9 in July 2021  3  Essential hypertension  4  Dyslipidemia  5  History of anemia     Patient's symptoms are suggestive of  Unstable angina  And possible left ventricular dysfunction  He does have multiple risk factors for coronary artery disease including hypertension his diabetes and dyslipidemia  Blood pressure is noted to be elevated  On clinical examination there is no evidence of heart failure at this time  ECG does not suggest conduction disease  Patient Active Problem List   Diagnosis    Type 2 diabetes mellitus with diabetic polyneuropathy, without long-term current use of insulin ([facility] [address] )    Essential hypertension    Anemia        CARDIAC PLAN:     Type of Acute Coronary Syndrome: NSTE-ACS (RAFAEL-ACS vs  NSTE-ACS)    Relevant comorbidities:  Hypertension, diabetes and dyslipidemia  (hypertension, Heart failure, diabetes mellitus, CKD, Anemia)    LV EF:   Not known    Creatinine:  1 0, Hb:  39 4;  Troponin:  1 66     [place] class: 1    Treatment strategy:  Early invasive ( Immediate invasive- <2H; Early Invasive- within 24 H, Delayed Invasive- within 25-72 H; Ischemia Guided)    --  Patient should be loaded with Brilinta 180 mg x 1 and 90 mg p o  twice daily from tomorrow  --  Initiate heparin therapy  As per ACS protocol  --  Continue aspirin 81 mg daily,  add metoprolol  Tartrate 25 mg twice daily  --   Change losartan - HCTZ to losartan 100 mg daily  --  Increase rosuvastatin to 40 mg daily  --  If typical angina-like chest pain ECG should be repeated and anti anginal therapy with nitroglycerin and morphine should be considered  --  Cardiac catheterization tomorrow to evaluate for  Occlusive CAD  I have discussed this procedure with patient including benefits risks and alternatives  He was little hesitant in the beginning but is agreeable  --   NPO after midnight except for medications with sips of water  --   Repeat cardiac enzymes around 10:00 p m  tonight and with morning labs in a m   --   Echocardiogram in a m  to assess cardiac structure and function  --   Added NT proBNP to the blood work from earlier today  -- Continuous rhythm monitoring and monitoring for signs of heart failure   Is advised  -- Repeat ECG and inform MD if patient has chest pain  -- Patient education about diagnosis and counselling about smoking cessation and medical compliance  *-*-*-*-*-*-*-*-*-*-*-*-*-*-*-*-*-*-*-*-*-*-*-*-*-*-*-*-*-*-*-*-*-*-*-*-*-*-*-*-*-*-*-*-*-*-*-*-*-*-*-*-*-*-  HISTORY OF PRESENT ILLNESS     Patient is a   28-year-old gentleman with prior medical history significant for diabetes mellitus with long-term use of insulin, essential hypertension, dyslipidemia and  Significant alcohol use  He has presented to emergency room with  Increasing shortness of breath and Some uneasiness in the chest      he states the symptoms started with URI like symptoms about 6 weeks back  However in the last 3 weeks he started noticing decrease in his exercise tolerance and increasing shortness of breath with activity  He denies typical chest pain but reports he feels some  Uneasiness which is unable to describe    He denies exertional diaphoresis or nausea or vomiting  Denies palpitations or presyncope/syncope  He mentioned his symptoms to his wife and has had a virtual visit with his primary care physician today who advised him to come to the emergency room  In the ER he is noted to have elevated blood pressure of 176/88%  His heart rate and oxygenation is normal   His ECG shows some T-wave inversions in lead 1 and aVL which are new compared to his previous ECG  His laboratory evaluation is significant for elevated troponin of 1 66      he denies recent orthopnea, PND or worsening pedal edema  He has had no presyncope /syncope  He mentions that he was diagnosed with diabetes mellitus 3 years back  Since then he has been on insulin along with metformin  He reports that he has modified his lifestyle and has been compliant with medications with good control of his diabetes  He has never been a smoker  States that he drinks mostly on weekends but significantly  Denies illicit drug use  His family history is negative for premature CAD or sudden cardiac death  He retired as  and currently works part-time as a  for [employer]  *-*-*-*-*-*-*-*-*-*-*-*-*-*-*-*-*-*-*-*-*-*-*-*-*-*-*-*-*-*-*-*-*-*-*-*-*-*-*-*-*-*-*-*-*-*-*-*-*-*-*-*-*-*  PAST MEDICAL HISTORY:     Past Medical History:   Diagnosis Date    Diabetes mellitus ([facility] [address] )     Hypertension     PAST SURGICAL HISTORY     Past Surgical History:   Procedure Laterality Date    INCISION AND DRAINAGE OF WOUND Right 9/8/2019    Procedure: INCISION AND DRAINAGE (I&D) EXTREMITY;  Surgeon: Charlotte Olson DPM;  Location: [facility];  Service: Podiatry          FAMILY HISTORY     History reviewed  No pertinent family history    SOCIAL HISTORY     Social History     Tobacco Use   Smoking Status Never Smoker   Smokeless Tobacco Never Used      Social History     Substance and Sexual Activity   Alcohol Use Yes    Alcohol/week: 30 0 standard drinks    Types: 30 Cans of beer per week    Comment: 5-6 beers 4-5 x per week      Social History     Substance and Sexual Activity   Drug Use Never    [id number]     *-*-*-*-*-*-*-*-*-*-*-*-*-*-*-*-*-*-*-*-*-*-*-*-*-*-*-*-*-*-*-*-*-*-*-*-*-*-*-*-*-*-*-*-*-*-*-*-*-*-*-*-*-*  ALLERGIES     No Known Allergies  CURRENT SCHEDULED MEDICATIONS     No current facility-administered medications for this encounter  Current Outpatient Medications:     cholecalciferol (VITAMIN D3) 1,000 units tablet, Take 5,000 Units by mouth daily, Disp: , Rfl:     LANTUS SOLOSTAR 100 units/mL injection pen, 36 Units daily at bedtime , Disp: , Rfl:     losartan-hydrochlorothiazide (HYZAAR) 100-25 MG per tablet, Take 1 tablet by mouth daily , Disp: , Rfl:     metFORMIN (GLUCOPHAGE) 1000 MG tablet, Take 1,000 mg by mouth 2 (two) times a day with meals, Disp: , Rfl:     rosuvastatin (CRESTOR) 5 mg tablet, Take 5 mg by mouth MWF three times a week, Disp: , Rfl:      *-*-*-*-*-*-*-*-*-*-*-*-*-*-*-*-*-*-*-*-*-*-*-*-*-*-*-*-*-*-*-*-*-*-*-*-*-*-*-*-*-*-*-*-*-*-*-*-*-*-*-*-*-*   REVIEW OF SYSTEMS     Positive for:    As noted above in HPI  Negative for: All remaining as reviewed below and in HPI   SYSTEM SYMPTOMS REVIEWED:  General--weight change, fever, night sweats  Respiratoryl-- Wheezing, shortness of breath, cough, URI symptoms, sputum, blood  Cardiovascular--chest pain, syncope, dyspnea on exertion, edema, decline in exercise tolerance, claudication   Gastrointestinal--persistent vomiting, diarrhea, abdominal distention, blood in stool   Muscular or skeletal--joint pain or swelling   Neurologic--headaches, syncope, abnormal movement  Hematologic--history of easy bruising and bleeding   Endocrine--thyroid enlargement, heat or cold intolerance, polyuria   Psychiatric--anxiety, depression      *-*-*-*-*-*-*-*-*-*-*-*-*-*-*-*-*-*-*-*-*-*-*-*-*-*-*-*-*-*-*-*-*-*-*-*-*-*-*-*-*-*-*-*-*-*-*-*-*-*-*-*-*-*-   VITAL SIGNS     Vitals:    09/22/21 1522 09/22/21 1524 09/22/21 1737   BP: (!) 176/88  157/94   BP Location: Right arm  Right arm   Pulse: 84  76   Resp: 18  18   Temp:  98 3 °F (36 8 °C)    TempSrc:  Oral    SpO2: 97%  93%   Weight: 95 kg (209 lb 7 oz)         TEMPERATURE HISTORY 24H: Temp (24hrs), Av 3 °F (36 8 °C), Min:98 3 °F (36 8 °C), Max:98 3 °F (36 8 °C)     BLOOD PRESSURE HISTORY: Systolic (32JDG), YML:280 , Min:157 , SNQ:674    Diastolic (30NEC), XWW:16, Min:88, Max:94      WEIGHTS:   Wt Readings from Last 25 Encounters:   21 95 kg (209 lb 7 oz)   20 90 3 kg (199 lb)   19 90 7 kg (199 lb 15 3 oz)   19 89 6 kg (197 lb 8 5 oz)        BMI: [unfilled]     I&O: No intake or output data in the 24 hours ending 21 1924   *-*-*-*-*-*-*-*-*-*-*-*-*-*-*-*-*-*-*-*-*-*-*-*-*-*-*-*-*-*-*-*-*-*-*-*-*-*-*-*-*-*-*-*-*-*-*-*-*-*-*-*-*-*-   PHYSICAL EXAMINATION:     General Appearance:    Alert, cooperative, no distress, appears stated age , slightly overweight   Head, Eyes, ENT:    No obvious abnormality, moist mucous mebranes  Neck:   Supple, no carotid bruit or JVD   Back:     Symmetric, no curvature  Lungs:     Respirations unlabored  Clear to auscultation bilaterally,    Chest wall:    No tenderness or deformity   Heart:    Regular rate and rhythm, S1 and S2 normal, no murmur, rub  or gallop  Abdomen:     Soft, non-tender, No obvious masses, or organomegaly   Extremities:   Extremities warm, no cyanosis or edema    Skin:   Skin color, texture, turgor normal, no rashes or lesions     *-*-*-*-*-*-*-*-*-*-*-*-*-*-*-*-*-*-*-*-*-*-*-*-*-*-*-*-*-*-*-*-*-*-*-*-*-*-*-*-*-*-*-*-*-*-*-*-*-*-*-*-*-*-   LABORATORY DATA:    I have personally reviewed the available laboratory data      CMP:  Results from last 7 days   Lab Units 21  1716   SODIUM mmol/L 140   POTASSIUM mmol/L 4 3   CHLORIDE mmol/L 105   CO2 mmol/L 25   BUN mg/dL 20   CREATININE mg/dL 1 00   CALCIUM mg/dL 9 0   ALK PHOS U/L 84   ALT U/L 30   AST U/L 25               Cardiac Profile: Results from last 7 days   Lab Units 09/22/21  1716   TROPONIN I ng/mL 1 66*                CBC:   Results from last 7 days   Lab Units 09/22/21  1716   WBC Thousand/uL 7 47   HEMOGLOBIN g/dL 13 1   HEMATOCRIT % 39 4   PLATELETS Thousands/uL 287     PT/INR: No results found for: PT, INR, Microbiology:          *-*-*-*-*-*-*-*-*-*-*-*-*-*-*-*-*-*-*-*-*-*-*-*-*-*-*-*-*-*-*-*-*-*-*-*-*-*-*-*-*-*-*-*-*-*-*-*-*-*-*-*-*-*-   RADIOLOGY RESULTS:     No results found  *-*-*-*-*-*-*-*-*-*-*-*-*-*-*-*-*-*-*-*-*-*-*-*-*-*-*-*-*-*-*-*-*-*-*-*-*-*-*-*-*-*-*-*-*-*-*-*-*-*-*-*-*-*-    LAST ECG, ECHOCARDIOGRAM AND OTHER CARDIOLOGY TEST RESULTS:     Results for orders placed or performed during the hospital encounter of 09/22/21   ECG 12 lead   Result Value    Ventricular Rate 84    Atrial Rate 84    ME Interval 158    QRSD Interval 84    QT Interval 368    QTC Interval 434    P Axis 56    QRS Axis -16    T Wave Axis 98    Narrative    Normal sinus rhythm  Minimal voltage criteria for LVH, may be normal variant  Cannot rule out Septal infarct , age undetermined  Nonspecific ST and T wave abnormality  Abnormal ECG  When compared with ECG of 08-SEP-2019 06:19,  Possible Septal infarct is now Present  ST now depressed in Lateral leads  Nonspecific T wave abnormality no longer evident in Inferior leads  T wave inversion now evident in Lateral leads  Confirmed by Efrain Coughlin ([id number]) on 9/22/2021 3:36:21 PM    No results found for this or any previous visit  No results found for this or any previous visit  No results found for this or any previous visit  No results found for this or any previous visit  *-*-*-*-*-*-*-*-*-*-*-*-*-*-*-*-*-*-*-*-*-*-*-*-*-*-*-*-*-*-*-*-*-*-*-*-*-*-*-*-*-*-*-*-*-*-*-*-*-*-*-*-*-*-  SIGNATURES:   @PWB@   Eartha Kocher, MD     CC:   Jayme Mccallum DO   No ref   provider found

## 2021-09-23 ENCOUNTER — HOSPITAL ENCOUNTER (INPATIENT)
Facility: HOSPITAL | Age: 61
LOS: 7 days | Discharge: HOME HEALTH | DRG: 235 | End: 2021-09-30
Attending: FAMILY MEDICINE | Admitting: THORACIC SURGERY (CARDIOTHORACIC VASCULAR SURGERY)
Payer: COMMERCIAL

## 2021-09-23 ENCOUNTER — APPOINTMENT (INPATIENT)
Dept: NON INVASIVE DIAGNOSTICS | Facility: HOSPITAL | Age: 61
DRG: 281 | End: 2021-09-23
Payer: COMMERCIAL

## 2021-09-23 ENCOUNTER — APPOINTMENT (INPATIENT)
Dept: NON INVASIVE DIAGNOSTICS | Facility: HOSPITAL | Age: 61
DRG: 281 | End: 2021-09-23
Attending: INTERNAL MEDICINE
Payer: COMMERCIAL

## 2021-09-23 VITALS
HEIGHT: 67 IN | WEIGHT: 204.81 LBS | HEART RATE: 72 BPM | DIASTOLIC BLOOD PRESSURE: 80 MMHG | SYSTOLIC BLOOD PRESSURE: 181 MMHG | BODY MASS INDEX: 32.15 KG/M2 | OXYGEN SATURATION: 97 % | TEMPERATURE: 97.5 F | RESPIRATION RATE: 18 BRPM

## 2021-09-23 DIAGNOSIS — Z95.1 S/P CABG X 2: ICD-10-CM

## 2021-09-23 DIAGNOSIS — I24.9 ACS (ACUTE CORONARY SYNDROME) (HCC): ICD-10-CM

## 2021-09-23 DIAGNOSIS — E11.42 TYPE 2 DIABETES MELLITUS WITH DIABETIC POLYNEUROPATHY, WITHOUT LONG-TERM CURRENT USE OF INSULIN (HCC): Primary | ICD-10-CM

## 2021-09-23 LAB
ALBUMIN SERPL BCP-MCNC: 3.5 G/DL (ref 3.5–5)
ALP SERPL-CCNC: 74 U/L (ref 46–116)
ALT SERPL W P-5'-P-CCNC: 27 U/L (ref 12–78)
ANION GAP SERPL CALCULATED.3IONS-SCNC: 11 MMOL/L (ref 4–13)
APTT PPP: 46 SECONDS (ref 23–37)
AST SERPL W P-5'-P-CCNC: 20 U/L (ref 5–45)
BILIRUB SERPL-MCNC: 0.57 MG/DL (ref 0.2–1)
BUN SERPL-MCNC: 21 MG/DL (ref 5–25)
CALCIUM SERPL-MCNC: 8.8 MG/DL (ref 8.3–10.1)
CHLORIDE SERPL-SCNC: 107 MMOL/L (ref 100–108)
CHOLEST SERPL-MCNC: 119 MG/DL (ref 50–200)
CO2 SERPL-SCNC: 25 MMOL/L (ref 21–32)
CREAT SERPL-MCNC: 1.09 MG/DL (ref 0.6–1.3)
ERYTHROCYTE [DISTWIDTH] IN BLOOD BY AUTOMATED COUNT: 12.9 % (ref 11.6–15.1)
GFR SERPL CREATININE-BSD FRML MDRD: 73 ML/MIN/1.73SQ M
GLUCOSE SERPL-MCNC: 103 MG/DL (ref 65–140)
GLUCOSE SERPL-MCNC: 151 MG/DL (ref 65–140)
GLUCOSE SERPL-MCNC: 91 MG/DL (ref 65–140)
GLUCOSE SERPL-MCNC: 98 MG/DL (ref 65–140)
HCT VFR BLD AUTO: 35.9 % (ref 36.5–49.3)
HDLC SERPL-MCNC: 39 MG/DL
HGB BLD-MCNC: 11.9 G/DL (ref 12–17)
LDLC SERPL CALC-MCNC: 64 MG/DL (ref 0–100)
MCH RBC QN AUTO: 29.8 PG (ref 26.8–34.3)
MCHC RBC AUTO-ENTMCNC: 33.1 G/DL (ref 31.4–37.4)
MCV RBC AUTO: 90 FL (ref 82–98)
NONHDLC SERPL-MCNC: 80 MG/DL
PLATELET # BLD AUTO: 250 THOUSANDS/UL (ref 149–390)
PMV BLD AUTO: 9.9 FL (ref 8.9–12.7)
POTASSIUM SERPL-SCNC: 3.6 MMOL/L (ref 3.5–5.3)
PROT SERPL-MCNC: 6.5 G/DL (ref 6.4–8.2)
RBC # BLD AUTO: 4 MILLION/UL (ref 3.88–5.62)
SODIUM SERPL-SCNC: 143 MMOL/L (ref 136–145)
TRIGL SERPL-MCNC: 78 MG/DL
TROPONIN I SERPL-MCNC: 1.87 NG/ML
WBC # BLD AUTO: 7.03 THOUSAND/UL (ref 4.31–10.16)

## 2021-09-23 PROCEDURE — 82948 REAGENT STRIP/BLOOD GLUCOSE: CPT

## 2021-09-23 PROCEDURE — C1887 CATHETER, GUIDING: HCPCS | Performed by: INTERNAL MEDICINE

## 2021-09-23 PROCEDURE — C1769 GUIDE WIRE: HCPCS | Performed by: INTERNAL MEDICINE

## 2021-09-23 PROCEDURE — C1894 INTRO/SHEATH, NON-LASER: HCPCS | Performed by: INTERNAL MEDICINE

## 2021-09-23 PROCEDURE — 99153 MOD SED SAME PHYS/QHP EA: CPT | Performed by: INTERNAL MEDICINE

## 2021-09-23 PROCEDURE — 80061 LIPID PANEL: CPT | Performed by: INTERNAL MEDICINE

## 2021-09-23 PROCEDURE — B216YZZ FLUOROSCOPY OF RIGHT AND LEFT HEART USING OTHER CONTRAST: ICD-10-PCS | Performed by: INTERNAL MEDICINE

## 2021-09-23 PROCEDURE — 93458 L HRT ARTERY/VENTRICLE ANGIO: CPT | Performed by: INTERNAL MEDICINE

## 2021-09-23 PROCEDURE — 85027 COMPLETE CBC AUTOMATED: CPT | Performed by: INTERNAL MEDICINE

## 2021-09-23 PROCEDURE — 80053 COMPREHEN METABOLIC PANEL: CPT | Performed by: INTERNAL MEDICINE

## 2021-09-23 PROCEDURE — 85730 THROMBOPLASTIN TIME PARTIAL: CPT | Performed by: INTERNAL MEDICINE

## 2021-09-23 PROCEDURE — 93306 TTE W/DOPPLER COMPLETE: CPT

## 2021-09-23 PROCEDURE — 84484 ASSAY OF TROPONIN QUANT: CPT | Performed by: INTERNAL MEDICINE

## 2021-09-23 PROCEDURE — 99239 HOSP IP/OBS DSCHRG MGMT >30: CPT | Performed by: INTERNAL MEDICINE

## 2021-09-23 PROCEDURE — 99152 MOD SED SAME PHYS/QHP 5/>YRS: CPT | Performed by: INTERNAL MEDICINE

## 2021-09-23 PROCEDURE — 93306 TTE W/DOPPLER COMPLETE: CPT | Performed by: INTERNAL MEDICINE

## 2021-09-23 PROCEDURE — 99232 SBSQ HOSP IP/OBS MODERATE 35: CPT | Performed by: INTERNAL MEDICINE

## 2021-09-23 PROCEDURE — 4A023N7 MEASUREMENT OF CARDIAC SAMPLING AND PRESSURE, LEFT HEART, PERCUTANEOUS APPROACH: ICD-10-PCS | Performed by: INTERNAL MEDICINE

## 2021-09-23 RX ORDER — ACETAMINOPHEN 325 MG/1
650 TABLET ORAL EVERY 6 HOURS PRN
Status: CANCELLED | OUTPATIENT
Start: 2021-09-23

## 2021-09-23 RX ORDER — OXYCODONE HYDROCHLORIDE 5 MG/1
5 TABLET ORAL EVERY 4 HOURS PRN
Status: DISCONTINUED | OUTPATIENT
Start: 2021-09-23 | End: 2021-09-27 | Stop reason: HOSPADM

## 2021-09-23 RX ORDER — HEPARIN SODIUM 10000 [USP'U]/100ML
3-20 INJECTION, SOLUTION INTRAVENOUS
Status: DISCONTINUED | OUTPATIENT
Start: 2021-09-23 | End: 2021-09-27 | Stop reason: HOSPADM

## 2021-09-23 RX ORDER — ATORVASTATIN CALCIUM 80 MG/1
80 TABLET, FILM COATED ORAL EVERY EVENING
Status: CANCELLED | OUTPATIENT
Start: 2021-09-23

## 2021-09-23 RX ORDER — INSULIN GLARGINE 100 [IU]/ML
22 INJECTION, SOLUTION SUBCUTANEOUS
Status: DISCONTINUED | OUTPATIENT
Start: 2021-09-23 | End: 2021-09-24

## 2021-09-23 RX ORDER — HEPARIN SODIUM 10000 [USP'U]/100ML
3-20 INJECTION, SOLUTION INTRAVENOUS
Status: CANCELLED | OUTPATIENT
Start: 2021-09-23

## 2021-09-23 RX ORDER — NITROGLYCERIN 0.4 MG/1
0.4 TABLET SUBLINGUAL
Status: DISCONTINUED | OUTPATIENT
Start: 2021-09-23 | End: 2021-09-27 | Stop reason: HOSPADM

## 2021-09-23 RX ORDER — LIDOCAINE HYDROCHLORIDE 20 MG/ML
INJECTION, SOLUTION EPIDURAL; INFILTRATION; INTRACAUDAL; PERINEURAL CODE/TRAUMA/SEDATION MEDICATION
Status: COMPLETED | OUTPATIENT
Start: 2021-09-23 | End: 2021-09-23

## 2021-09-23 RX ORDER — ONDANSETRON 2 MG/ML
4 INJECTION INTRAMUSCULAR; INTRAVENOUS EVERY 6 HOURS PRN
Status: CANCELLED | OUTPATIENT
Start: 2021-09-23

## 2021-09-23 RX ORDER — FENTANYL CITRATE 50 UG/ML
INJECTION, SOLUTION INTRAMUSCULAR; INTRAVENOUS CODE/TRAUMA/SEDATION MEDICATION
Status: COMPLETED | OUTPATIENT
Start: 2021-09-23 | End: 2021-09-23

## 2021-09-23 RX ORDER — OXYCODONE HYDROCHLORIDE 5 MG/1
5 TABLET ORAL EVERY 4 HOURS PRN
Status: CANCELLED | OUTPATIENT
Start: 2021-09-23

## 2021-09-23 RX ORDER — HEPARIN SODIUM 10000 [USP'U]/100ML
3-20 INJECTION, SOLUTION INTRAVENOUS
Status: DISCONTINUED | OUTPATIENT
Start: 2021-09-23 | End: 2021-09-23

## 2021-09-23 RX ORDER — ASPIRIN 81 MG/1
81 TABLET ORAL DAILY
Status: DISCONTINUED | OUTPATIENT
Start: 2021-09-24 | End: 2021-09-27 | Stop reason: HOSPADM

## 2021-09-23 RX ORDER — ASPIRIN 81 MG/1
81 TABLET ORAL DAILY
Status: CANCELLED | OUTPATIENT
Start: 2021-09-24

## 2021-09-23 RX ORDER — HEPARIN SODIUM 1000 [USP'U]/ML
4000 INJECTION, SOLUTION INTRAVENOUS; SUBCUTANEOUS
Status: CANCELLED | OUTPATIENT
Start: 2021-09-23

## 2021-09-23 RX ORDER — LOSARTAN POTASSIUM 50 MG/1
100 TABLET ORAL DAILY
Status: DISCONTINUED | OUTPATIENT
Start: 2021-09-24 | End: 2021-09-24

## 2021-09-23 RX ORDER — HEPARIN SODIUM 1000 [USP'U]/ML
2000 INJECTION, SOLUTION INTRAVENOUS; SUBCUTANEOUS
Status: DISCONTINUED | OUTPATIENT
Start: 2021-09-23 | End: 2021-09-27 | Stop reason: HOSPADM

## 2021-09-23 RX ORDER — HEPARIN SODIUM 1000 [USP'U]/ML
INJECTION, SOLUTION INTRAVENOUS; SUBCUTANEOUS CODE/TRAUMA/SEDATION MEDICATION
Status: COMPLETED | OUTPATIENT
Start: 2021-09-23 | End: 2021-09-23

## 2021-09-23 RX ORDER — LOSARTAN POTASSIUM 50 MG/1
100 TABLET ORAL DAILY
Status: CANCELLED | OUTPATIENT
Start: 2021-09-24

## 2021-09-23 RX ORDER — ONDANSETRON 2 MG/ML
4 INJECTION INTRAMUSCULAR; INTRAVENOUS EVERY 6 HOURS PRN
Status: DISCONTINUED | OUTPATIENT
Start: 2021-09-23 | End: 2021-09-27 | Stop reason: HOSPADM

## 2021-09-23 RX ORDER — NITROGLYCERIN 0.4 MG/1
0.4 TABLET SUBLINGUAL
Status: CANCELLED | OUTPATIENT
Start: 2021-09-23

## 2021-09-23 RX ORDER — VERAPAMIL HYDROCHLORIDE 2.5 MG/ML
INJECTION, SOLUTION INTRAVENOUS CODE/TRAUMA/SEDATION MEDICATION
Status: COMPLETED | OUTPATIENT
Start: 2021-09-23 | End: 2021-09-23

## 2021-09-23 RX ORDER — NITROGLYCERIN 20 MG/100ML
INJECTION INTRAVENOUS CODE/TRAUMA/SEDATION MEDICATION
Status: COMPLETED | OUTPATIENT
Start: 2021-09-23 | End: 2021-09-23

## 2021-09-23 RX ORDER — SODIUM CHLORIDE 9 MG/ML
50 INJECTION, SOLUTION INTRAVENOUS CONTINUOUS
Status: DISPENSED | OUTPATIENT
Start: 2021-09-23 | End: 2021-09-23

## 2021-09-23 RX ORDER — ATORVASTATIN CALCIUM 80 MG/1
80 TABLET, FILM COATED ORAL EVERY EVENING
Status: DISCONTINUED | OUTPATIENT
Start: 2021-09-23 | End: 2021-09-27 | Stop reason: HOSPADM

## 2021-09-23 RX ORDER — ACETAMINOPHEN 325 MG/1
650 TABLET ORAL EVERY 6 HOURS PRN
Status: DISCONTINUED | OUTPATIENT
Start: 2021-09-23 | End: 2021-09-27 | Stop reason: HOSPADM

## 2021-09-23 RX ORDER — SODIUM CHLORIDE 9 MG/ML
50 INJECTION, SOLUTION INTRAVENOUS CONTINUOUS
Status: DISCONTINUED | OUTPATIENT
Start: 2021-09-23 | End: 2021-09-23 | Stop reason: HOSPADM

## 2021-09-23 RX ORDER — HEPARIN SODIUM 1000 [USP'U]/ML
2000 INJECTION, SOLUTION INTRAVENOUS; SUBCUTANEOUS
Status: CANCELLED | OUTPATIENT
Start: 2021-09-23

## 2021-09-23 RX ORDER — MIDAZOLAM HYDROCHLORIDE 2 MG/2ML
INJECTION, SOLUTION INTRAMUSCULAR; INTRAVENOUS CODE/TRAUMA/SEDATION MEDICATION
Status: COMPLETED | OUTPATIENT
Start: 2021-09-23 | End: 2021-09-23

## 2021-09-23 RX ORDER — ATORVASTATIN CALCIUM 80 MG/1
80 TABLET, FILM COATED ORAL EVERY EVENING
Status: DISCONTINUED | OUTPATIENT
Start: 2021-09-23 | End: 2021-09-23 | Stop reason: HOSPADM

## 2021-09-23 RX ORDER — HEPARIN SODIUM 1000 [USP'U]/ML
4000 INJECTION, SOLUTION INTRAVENOUS; SUBCUTANEOUS
Status: DISCONTINUED | OUTPATIENT
Start: 2021-09-23 | End: 2021-09-27 | Stop reason: HOSPADM

## 2021-09-23 RX ORDER — INSULIN GLARGINE 100 [IU]/ML
22 INJECTION, SOLUTION SUBCUTANEOUS
Status: CANCELLED | OUTPATIENT
Start: 2021-09-23

## 2021-09-23 RX ORDER — SODIUM CHLORIDE 9 MG/ML
50 INJECTION, SOLUTION INTRAVENOUS CONTINUOUS
Status: CANCELLED | OUTPATIENT
Start: 2021-09-23 | End: 2021-09-23

## 2021-09-23 RX ADMIN — VERAPAMIL HYDROCHLORIDE 2.5 MG: 2.5 INJECTION INTRAVENOUS at 09:27

## 2021-09-23 RX ADMIN — TICAGRELOR 90 MG: 90 TABLET ORAL at 08:11

## 2021-09-23 RX ADMIN — FENTANYL CITRATE 50 MCG: 50 INJECTION INTRAMUSCULAR; INTRAVENOUS at 09:17

## 2021-09-23 RX ADMIN — FENTANYL CITRATE 50 MCG: 50 INJECTION INTRAMUSCULAR; INTRAVENOUS at 09:39

## 2021-09-23 RX ADMIN — LOSARTAN POTASSIUM 100 MG: 50 TABLET, FILM COATED ORAL at 08:11

## 2021-09-23 RX ADMIN — METOPROLOL TARTRATE 25 MG: 25 TABLET, FILM COATED ORAL at 00:18

## 2021-09-23 RX ADMIN — INSULIN LISPRO 1 UNITS: 100 INJECTION, SOLUTION INTRAVENOUS; SUBCUTANEOUS at 21:30

## 2021-09-23 RX ADMIN — MIDAZOLAM 1 MG: 1 INJECTION INTRAMUSCULAR; INTRAVENOUS at 09:25

## 2021-09-23 RX ADMIN — IOHEXOL 86 ML: 350 INJECTION, SOLUTION INTRAVENOUS at 09:49

## 2021-09-23 RX ADMIN — NITROGLYCERIN 200 MCG: 20 INJECTION INTRAVENOUS at 09:27

## 2021-09-23 RX ADMIN — LIDOCAINE HYDROCHLORIDE 2 ML: 20 INJECTION, SOLUTION EPIDURAL; INFILTRATION; INTRACAUDAL; PERINEURAL at 09:24

## 2021-09-23 RX ADMIN — METOPROLOL TARTRATE 25 MG: 25 TABLET, FILM COATED ORAL at 20:15

## 2021-09-23 RX ADMIN — MIDAZOLAM 2 MG: 1 INJECTION INTRAMUSCULAR; INTRAVENOUS at 09:17

## 2021-09-23 RX ADMIN — FENTANYL CITRATE 50 MCG: 50 INJECTION INTRAMUSCULAR; INTRAVENOUS at 09:25

## 2021-09-23 RX ADMIN — HEPARIN SODIUM 13.1 UNITS/KG/HR: 10000 INJECTION, SOLUTION INTRAVENOUS at 20:04

## 2021-09-23 RX ADMIN — INSULIN GLARGINE 22 UNITS: 100 INJECTION, SOLUTION SUBCUTANEOUS at 00:18

## 2021-09-23 RX ADMIN — ATORVASTATIN CALCIUM 80 MG: 80 TABLET, FILM COATED ORAL at 20:15

## 2021-09-23 RX ADMIN — MIDAZOLAM 1 MG: 1 INJECTION INTRAMUSCULAR; INTRAVENOUS at 09:39

## 2021-09-23 RX ADMIN — METOPROLOL TARTRATE 25 MG: 25 TABLET, FILM COATED ORAL at 08:11

## 2021-09-23 RX ADMIN — ACETAMINOPHEN 650 MG: 325 TABLET ORAL at 20:15

## 2021-09-23 RX ADMIN — INSULIN GLARGINE 22 UNITS: 100 INJECTION, SOLUTION SUBCUTANEOUS at 21:30

## 2021-09-23 RX ADMIN — SODIUM CHLORIDE 50 ML/HR: 0.9 INJECTION, SOLUTION INTRAVENOUS at 11:00

## 2021-09-23 RX ADMIN — HEPARIN SODIUM 4000 UNITS: 1000 INJECTION INTRAVENOUS; SUBCUTANEOUS at 09:27

## 2021-09-23 RX ADMIN — HEPARIN SODIUM 2000 UNITS: 1000 INJECTION INTRAVENOUS; SUBCUTANEOUS at 03:56

## 2021-09-23 RX ADMIN — ASPIRIN 81 MG: 81 TABLET, COATED ORAL at 08:11

## 2021-09-23 NOTE — H&P
[facility]  H&P- Leela Pop 1960, 62 y o  male MRN: [id number]  Unit/Bed#: ED 03 Encounter: [id number]  Primary Care Provider: Karli Gramajo DO   Date and time admitted to hospital: 9/22/2021  4:39 PM    Assessment and Plan  * ACS (acute coronary syndrome) [facility])  Assessment & Plan  · Dyspnea on exertion with elevated troponin concerning for NSTEMI  · Has had URI symptoms for last 2-3 weeks concerning for viral syndrome  · Differentials include:  · NSTEMI  Cardiology recommending heparin infusion, adding aspirin and statin, and loading with Brilinta with plans for cardiac catheterization tomorrow  · Rayray/pericarditis  Echocardiogram ordered    Results from last 7 days   Lab Units 09/22/21 2012 09/22/21  1716   TROPONIN I ng/mL 1 70* 1 66*       Hyperlipidemia  Assessment & Plan  · Increase intensity of statin dosing to atorvastatin 40 mg daily  · Check lipid profile in a m  Essential hypertension  Assessment & Plan  · Continue losartan but hold HCTZ in anticipation of cardiac catheterization    Type 2 diabetes mellitus with diabetic polyneuropathy, without long-term current use of insulin (HCC)  Assessment & Plan  Lab Results   Component Value Date    HGBA1C 6 9 (H) 07/20/2021       No results for input(s): POCGLU in the last 72 hours  · Will decrease glargine from 36 units to 20 units as patient will be NPO after midnight  Sliding scale insulin added  VTE Prophylaxis: Heparin Drip  Code Status: Level 1 - Full Code  Anticipated Length of Stay:  Patient will be admitted on an Inpatient basis with an anticipated length of stay of  greater than 2 midnights  Justification for Hospital Stay: ACS (acute coronary syndrome) ([facility] [address] )  Total Time for Visit, including Counseling / Coordination of Care: xx mins  Greater than 50% of this total time spent on direct patient counseling and coordination of care      Chief Complaint:     Chief Complaint   Patient presents with  Chest Pain     patient c/o having a "chest cold" for a few weeks and now c/o increased CP and SOB with exertion  denies COVID exposure  History of Present Illness:    Sarita Fall is a 62 y o  male with a past medical history of diabetes hypertension and hyperlipidemia who presents with three week duration of URI symptoms including rhinorrhea headache fatigue and cough  He denies any fevers chills or rash  Due to the last two days his distant exertion has worsened she came here to the emergency department where he was noted to have elevated cardiac enzymes  He is very active as he works as a  and a   He denies any active chest pain during examination    Review of Systems:  Review of Systems   Constitutional: Negative for chills, diaphoresis and fever  HENT: Negative for dental problem and facial swelling  Eyes: Negative for photophobia, pain and visual disturbance  Respiratory: Positive for shortness of breath  Negative for wheezing and stridor  Cardiovascular: Negative for chest pain and palpitations  Gastrointestinal: Negative for abdominal distention, abdominal pain, diarrhea, nausea and vomiting  Genitourinary: Negative for dysuria, hematuria and urgency  Musculoskeletal: Negative for back pain and myalgias  Skin: Negative for rash  Neurological: Negative for dizziness, seizures, speech difficulty, numbness and headaches  Psychiatric/Behavioral: Negative for agitation  The patient is not nervous/anxious  All other systems reviewed and are negative  Past Medical and Surgical History:   Past Medical History:   Diagnosis Date    Diabetes mellitus ([address] )     Hypertension      Past Surgical History:   Procedure Laterality Date    INCISION AND DRAINAGE OF WOUND Right 9/8/2019    Procedure: INCISION AND DRAINAGE (I&D) EXTREMITY;  Surgeon: Odette Jade DPM;  Location: [facility];  Service: Podiatry     Meds/Allergies:   Allergies: No Known Allergies  Prior to Admission Medications   Prescriptions Last Dose Informant Patient Reported? Taking? LANTUS SOLOSTAR 100 units/mL injection pen 2021 at Unknown time Self Yes Yes   Si Units daily at bedtime    cholecalciferol (VITAMIN D3) 1,000 units tablet 2021 at Unknown time  Yes Yes   Sig: Take 5,000 Units by mouth daily   losartan-hydrochlorothiazide (HYZAAR) 100-25 MG per tablet 2021 at Unknown time  Yes Yes   Sig: Take 1 tablet by mouth daily    metFORMIN (GLUCOPHAGE) 1000 MG tablet 2021 at Unknown time  Yes Yes   Sig: Take 1,000 mg by mouth 2 (two) times a day with meals   rosuvastatin (CRESTOR) 5 mg tablet 2021 at Unknown time Self Yes Yes   Sig: Take 5 mg by mouth MWF three times a week      Facility-Administered Medications: None     Social History:     Social History     Socioeconomic History    Marital status: /Civil Union     Spouse name: Not on file    Number of children: Not on file    Years of education: Not on file    Highest education level: Not on file   Occupational History    Occupation: [employer]      Employer: [employer]   Tobacco Use    Smoking status: Never Smoker    Smokeless tobacco: Never Used   Substance and Sexual Activity    Alcohol use: Yes     Alcohol/week: 30 0 standard drinks     Types: 30 Cans of beer per week     Comment: 5-6 beers 4-5 x per week     Drug use: Never    Sexual activity: Not on file   Other Topics Concern    Not on file   Social History Narrative    Not on file     Social Determinants of Health     Financial Resource Strain:     Difficulty of Paying Living Expenses:    Food Insecurity:     Worried About Running Out of Food in the Last Year:     [address] in the Last Year:    Transportation Needs:     Lack of Transportation (Medical):      Lack of Transportation (Non-Medical):    Physical Activity:     Days of Exercise per Week:     Minutes of Exercise per Session:    Stress:     Feeling of Stress :    Social Connections:     Frequency of Communication with Friends and Family:     Frequency of Social Gatherings with Friends and Family:     Attends Muslim Services:     Active Member of Clubs or Organizations:     Attends Club or Organization Meetings:     Marital Status:    Intimate Partner Violence:     Fear of Current or Ex-Partner:     Emotionally Abused:     Physically Abused:     Sexually Abused:      Patient Pre-hospital Living Situation:  Lives with spouse  Patient Pre-hospital Level of Mobility:   Patient Pre-hospital Diet Restrictions:     Family History:  History reviewed  No pertinent family history  Physical Exam:   Vitals:   Blood Pressure: 146/75 (09/22/21 1943)  Pulse: 81 (09/22/21 1943)  Temperature: 98 3 °F (36 8 °C) (09/22/21 1524)  Temp Source: Oral (09/22/21 1524)  Respirations: 17 (09/22/21 1943)  Weight - Scale: 95 kg (209 lb 7 oz) (09/22/21 1522)  SpO2: 95 % (09/22/21 1943)    Physical Exam  Vitals reviewed  Constitutional:       General: He is not in acute distress  HENT:      Head: Normocephalic and atraumatic  Mouth/Throat:      Mouth: Mucous membranes are moist    Eyes:      Extraocular Movements: Extraocular movements intact  Pupils: Pupils are equal, round, and reactive to light  Cardiovascular:      Rate and Rhythm: Normal rate and regular rhythm  Heart sounds: Normal heart sounds  Pulmonary:      Effort: Pulmonary effort is normal       Breath sounds: Decreased breath sounds present  No wheezing  Abdominal:      General: Bowel sounds are normal       Palpations: Abdomen is soft  Tenderness: There is no abdominal tenderness  There is no rebound  Musculoskeletal:         General: No swelling or tenderness  Cervical back: Normal range of motion  Skin:     General: Skin is warm and dry  Neurological:      General: No focal deficit present  Mental Status: He is alert and oriented to person, place, and time        Cranial Nerves: No cranial nerve deficit  Psychiatric:         Mood and Affect: Mood normal        Lab Results: I have personally reviewed pertinent reports  Results from last 7 days   Lab Units 09/22/21  1716   WBC Thousand/uL 7 47   HEMOGLOBIN g/dL 13 1   HEMATOCRIT % 39 4   PLATELETS Thousands/uL 287   NEUTROS PCT % 66   LYMPHS PCT % 21   MONOS PCT % 10   EOS PCT % 2     Results from last 7 days   Lab Units 09/22/21  1716   SODIUM mmol/L 140   POTASSIUM mmol/L 4 3   CHLORIDE mmol/L 105   CO2 mmol/L 25   ANION GAP mmol/L 10   BUN mg/dL 20   CREATININE mg/dL 1 00   CALCIUM mg/dL 9 0   ALBUMIN g/dL 4 0   TOTAL BILIRUBIN mg/dL 0 67   ALK PHOS U/L 84   ALT U/L 30   AST U/L 25   EGFR ml/min/1 73sq m 81   GLUCOSE RANDOM mg/dL 99         Results from last 7 days   Lab Units 09/22/21 2012 09/22/21  1716   TROPONIN I ng/mL 1 70* 1 66*             Results from last 7 days   Lab Units 09/22/21  1716   NT-PRO BNP pg/mL 2,025*            Imaging: I have personally reviewed pertinent films in PACS  XR chest portable  Date:  9/22/2021  Personal impression:  No infiltrates    EKG, Pathology, and Other Studies Reviewed on Admission:   EKG  Result Date: 09/22/21  Personally reviewed strips with impression of:  Normal sinus rhythm 84 bpm    Allscripts/ Epic Records Reviewed: Yes    ** Please Note: This note has been constructed using a voice recognition system   **

## 2021-09-23 NOTE — ASSESSMENT & PLAN NOTE
Lab Results   Component Value Date    HGBA1C 6 9 (H) 07/20/2021       Recent Labs     09/22/21  2348 09/23/21  0711   POCGLU 99 98       · (P) 98 5Will decrease glargine from 36 units to 20 units as patient will be NPO after midnight  Sliding scale insulin added

## 2021-09-23 NOTE — ASSESSMENT & PLAN NOTE
· Dyspnea on exertion with elevated troponin concerning for NSTEMI  · Has had URI symptoms for last 2-3 weeks concerning for viral syndrome  · Differentials include:  · NSTEMI  Cardiology recommending heparin infusion, adding aspirin and statin, and loading with Brilinta with plans for cardiac catheterization tomorrow  · Rayray/pericarditis    Echocardiogram ordered    Results from last 7 days   Lab Units 09/23/21  0549 09/22/21 2012 09/22/21  1716   TROPONIN I ng/mL 1 87* 1 70* 1 66*

## 2021-09-23 NOTE — UTILIZATION REVIEW
Initial Clinical Review    Admission: Date/Time/Statement:   Admission Orders (From admission, onward)     Ordered        09/22/21 1833  INPATIENT ADMISSION  Once                   Orders Placed This Encounter   Procedures    INPATIENT ADMISSION     Standing Status:   Standing     Number of Occurrences:   1     Order Specific Question:   Level of Care     Answer:   Med Surg [16]     Order Specific Question:   Estimated length of stay     Answer:   More than 2 Midnights     Order Specific Question:   Certification     Answer:   I certify that inpatient services are medically necessary for this patient for a duration of greater than two midnights  See H&P and MD Progress Notes for additional information about the patient's course of treatment  ED Arrival Information     Expected Arrival Acuity    - 9/22/2021 15:16 Urgent         Means of arrival Escorted by Service Admission type    OTILIA SHARP  [facility] Hospitalist Urgent         Arrival complaint    chest pain        Chief Complaint   Patient presents with    Chest Pain     patient c/o having a "chest cold" for a few weeks and now c/o increased CP and SOB with exertion  denies COVID exposure  Initial Presentation:  61 y/o male with PMhx of DM, HTN, HLD who presents to ED as a walk-in with 3 wk duration of URI symptoms including rhinorrhea, headache, fatigue, and cough with increasing dyspnea on exertion past 2 days  In ED trop 1 66-->1 70  Admit inpatient to M/S/Tele unit with possible NSTEMI -- Cardiology consulted Hep gtt, add asa, statin with increase dose 40 mg, loading dose with Brilanta with plans for cardiac cath tomorrow  Echo ordered  Continue losartan but hold HCTZ  Decrease glargine from 36 units to 20 units as pt will be npo after MN  Sliding scale insulin added  Date: 9/23  Day 2: Cardiac dong today showed severe left main disease along with 90% ostial LAD, 90% ostial circumflex, 90% diagonal stenosis, 2 obtuse marginal vessels    Left ventricular EF was decreased at a around 37%  There was significant anterior regional wall motion abnormality  Continue asa, losartan and metoprolol  Atorvastatin increased to 80 mg daily  Tiana Cage d/c'd  Cardiac diet  I/O's  OOB as rimma  ED Triage Vitals   Temperature Pulse Respirations Blood Pressure SpO2   09/22/21 1524 09/22/21 1522 09/22/21 1522 09/22/21 1522 09/22/21 1522   98 3 °F (36 8 °C) 84 18 (!) 176/88 97 %      Temp Source Heart Rate Source Patient Position - Orthostatic VS BP Location FiO2 (%)   09/22/21 1524 09/22/21 1737 09/22/21 1522 09/22/21 1522 --   Oral Monitor Sitting Right arm       Pain Score       09/22/21 1522       2          Wt Readings from Last 1 Encounters:   09/22/21 92 9 kg (204 lb 12 9 oz)     Additional Vital Signs:   Date/Time  Temp  Pulse  Resp  BP  MAP (mmHg)  SpO2  O2 Device   09/23/21 1100  97 °F (36 1 °C)Abnormal   61  18  149/75  104  96 %  None (Room air)   09/23/21 1045  --  60  18  130/76  97  --  --   09/23/21 1030  --  60  18  124/68  90  --  --   09/23/21 1015  --  65  18  137/77  102  --  --   09/23/21 0712  97 2 °F (36 2 °C)Abnormal   67  16  119/56  81  97 %  None (Room air)   09/23/21 0309  97 5 °F (36 4 °C)  61  18  133/79  --  95 %  None (Room air)   09/22/21 2347  97 5 °F (36 4 °C)  72  18  154/95  119  99 %  None (Room air)   09/22/21 1943  --  81  17  146/75  --  95 %  None (Room air)   09/22/21 1737  --  76  18  157/94  --  93 %  None (Room air)   09/22/21 1524  98 3 °F (36 8 °C)  --  --  --  --  --  --   09/22/21 1522  --  84  18  176/88Abnormal   --  97 %  None (Room air)       Pertinent Labs/Diagnostic Test Results:   EKG 9/22 --  NSR 84 bpm  CXR 9/23 -- No acute cardiopulmonary disease       Results from last 7 days   Lab Units 09/22/21  1707   SARS-COV-2  Negative     Results from last 7 days   Lab Units 09/23/21  0219 09/22/21  1716   WBC Thousand/uL 7 03 7 47   HEMOGLOBIN g/dL 11 9* 13 1   HEMATOCRIT % 35 9* 39 4   PLATELETS Thousands/uL 250 287 NEUTROS ABS Thousands/µL  --  4 93     Results from last 7 days   Lab Units 09/23/21  0219 09/22/21  1716   SODIUM mmol/L 143 140   POTASSIUM mmol/L 3 6 4 3   CHLORIDE mmol/L 107 105   CO2 mmol/L 25 25   ANION GAP mmol/L 11 10   BUN mg/dL 21 20   CREATININE mg/dL 1 09 1 00   EGFR ml/min/1 73sq m 73 81   CALCIUM mg/dL 8 8 9 0     Results from last 7 days   Lab Units 09/23/21  0219 09/22/21  1716   AST U/L 20 25   ALT U/L 27 30   ALK PHOS U/L 74 84   TOTAL PROTEIN g/dL 6 5 7 5   ALBUMIN g/dL 3 5 4 0   TOTAL BILIRUBIN mg/dL 0 57 0 67     Results from last 7 days   Lab Units 09/23/21  1104 09/23/21  0711 09/22/21  2348   POC GLUCOSE mg/dl 103 98 99     Results from last 7 days   Lab Units 09/23/21  0219 09/22/21  1716   GLUCOSE RANDOM mg/dL 91 99     Results from last 7 days   Lab Units 09/23/21  0549 09/22/21 2012 09/22/21  1716   TROPONIN I ng/mL 1 87* 1 70* 1 66*     Results from last 7 days   Lab Units 09/23/21  0219 09/22/21 2012   PTT seconds 46* 31     Results from last 7 days   Lab Units 09/22/21  1716   NT-PRO BNP pg/mL 2,025*       ED Treatment:   Medication Administration from 09/22/2021 1515 to 09/22/2021 2312       Date/Time Order Dose Route Action     09/22/2021 2015 ticagrelor (BRILINTA) tablet 180 mg 180 mg Oral Given     09/22/2021 2016 heparin (porcine) injection 4,000 Units 4,000 Units Intravenous Given     09/22/2021 2018 heparin (porcine) 25,000 units in 0 45% NaCl 250 mL infusion (premix) 11 1 Units/kg/hr Intravenous New Bag     Past Medical History:   Diagnosis Date    Diabetes mellitus ([facility] )     Hypertension      Present on Admission:   Essential hypertension   Type 2 diabetes mellitus with diabetic polyneuropathy, without long-term current use of insulin ([facility] )   ACS (acute coronary syndrome) ([facility] )   Hyperlipidemia      Admitting Diagnosis: Chest pain [R07 9]  Dyspnea on exertion [R06 00]  Elevated troponin [R77 8]  Age/Sex: 62 y o  male  Admission Orders:  Scheduled Medications:  aspirin, 81 mg, Oral, Daily  atorvastatin, 80 mg, Oral, QPM  insulin glargine, 22 Units, Subcutaneous, HS  insulin lispro, 1-6 Units, Subcutaneous, 4x Daily (AC & HS)  losartan, 100 mg, Oral, Daily  metoprolol tartrate, 25 mg, Oral, Q12H SOLEDAD    Continuous IV Infusions:  heparin (porcine), 3-20 Units/kg/hr (Order-Specific), Intravenous, Titrated  sodium chloride, 50 mL/hr, Intravenous, Continuous    PRN Meds:  acetaminophen, 650 mg, Oral, Q6H PRN  heparin (porcine), 2,000 Units, Intravenous, Q1H PRN  heparin (porcine), 4,000 Units, Intravenous, Q1H PRN  nitroglycerin, 0 4 mg, Sublingual, Q5 Min PRN  ondansetron, 4 mg, Intravenous, Q6H PRN  oxyCODONE, 5 mg, Oral, Q4H PRN          Network Utilization Review Department  ATTENTION: Please call with any questions or concerns to [phone] and carefully listen to the prompts so that you are directed to the right person  All voicemails are confidential   Thomas Porter all requests for admission clinical reviews, approved or denied determinations and any other requests to dedicated fax number below belonging to the campus where the patient is receiving treatment   List of dedicated fax numbers for the Facilities:  [address] DENIALS (Administrative/Medical Necessity) [phone]   [address] (Maternity/NICU/Pediatrics) [phone]   [address] [place] [address] [id number] [place] [address] [phone] [facility] [phone]

## 2021-09-23 NOTE — ED NOTES
Pt rang asking if he will go upstairs to an inpatient room; RN called supervisor and was informed room was assigned but there is no inpatient bed in that room  Pt was informed that he will be moved as soon as bed is placed in assigned room, pt is agreeable  Pt also provided with blanket at this time        Katrin Dumont RN  09/22/21 6566

## 2021-09-23 NOTE — PLAN OF CARE
Problem: Potential for Falls  Goal: Patient will remain free of falls  Description: INTERVENTIONS:  - Educate patient/family on patient safety including physical limitations  - Instruct patient to call for assistance with activity   - Consult OT/PT to assist with strengthening/mobility   - Keep Call bell within reach  - Keep bed low and locked with side rails adjusted as appropriate  - Keep care items and personal belongings within reach  - Initiate and maintain comfort rounds  - Make Fall Risk Sign visible to staff  - Offer Toileting every  Hours, in advance of need  - Initiate/Maintain alarm  - Obtain necessary fall risk management equipment  - Apply yellow socks and bracelet for high fall risk patients  - Consider moving patient to room near nurses station  Outcome: Progressing     Problem: PAIN - ADULT  Goal: Verbalizes/displays adequate comfort level or baseline comfort level  Description: Interventions:  - Encourage patient to monitor pain and request assistance  - Assess pain using appropriate pain scale  - Administer analgesics based on type and severity of pain and evaluate response  - Implement non-pharmacological measures as appropriate and evaluate response  - Consider cultural and social influences on pain and pain management  - Notify physician/advanced practitioner if interventions unsuccessful or patient reports new pain  Outcome: Progressing     Problem: DISCHARGE PLANNING  Goal: Discharge to home or other facility with appropriate resources  Description: INTERVENTIONS:  - Identify barriers to discharge w/patient and caregiver  - Arrange for needed discharge resources and transportation as appropriate  - Identify discharge learning needs (meds, wound care, etc )  - Arrange for interpretive services to assist at discharge as needed  - Refer to Case Management Department for coordinating discharge planning if the patient needs post-hospital services based on physician/advanced practitioner order or complex needs related to functional status, cognitive ability, or social support system  Outcome: Progressing     Problem: Knowledge Deficit  Goal: Patient/family/caregiver demonstrates understanding of disease process, treatment plan, medications, and discharge instructions  Description: Complete learning assessment and assess knowledge base    Interventions:  - Provide teaching at level of understanding  - Provide teaching via preferred learning methods  Outcome: Progressing     Problem: CARDIOVASCULAR - ADULT  Goal: Maintains optimal cardiac output and hemodynamic stability  Description: INTERVENTIONS:  - Monitor I/O, vital signs and rhythm  - Monitor for S/S and trends of decreased cardiac output  - Administer and titrate ordered vasoactive medications to optimize hemodynamic stability  - Assess quality of pulses, skin color and temperature  - Assess for signs of decreased coronary artery perfusion  - Instruct patient to report change in severity of symptoms  Outcome: Progressing  Goal: Absence of cardiac dysrhythmias or at baseline rhythm  Description: INTERVENTIONS:  - Continuous cardiac monitoring, vital signs, obtain 12 lead EKG if ordered  - Administer antiarrhythmic and heart rate control medications as ordered  - Monitor electrolytes and administer replacement therapy as ordered  Outcome: Progressing     Problem: METABOLIC, FLUID AND ELECTROLYTES - ADULT  Goal: Glucose maintained within target range  Description: INTERVENTIONS:  - Monitor Blood Glucose as ordered  - Assess for signs and symptoms of hyperglycemia and hypoglycemia  - Administer ordered medications to maintain glucose within target range  - Assess nutritional intake and initiate nutrition service referral as needed  Outcome: Progressing

## 2021-09-23 NOTE — ASSESSMENT & PLAN NOTE
Acute coronary syndrome  Presented with dyspnea on exertion with elevated troponins concerning for NSTEMI  Patient underwent cardiac catheterization revealing severe left main disease, 90% ostial LAD, 90% ostial circumflex, 90% diagonal stenosis, 2 obtuse marginal vessels, left ventricular ejection fraction of 37%  Discuss with Cardiology patient needs [facility] for CT surgery evaluation  Plan [place] [facility]  Telemetry monitoring  Resume heparin drip for hours after TR band is completely removed    Continue with aspirin, losartan, metoprolol, statin  Patient has anxiety about surgery, would like wife and family present before making decision   CT surgery consulted

## 2021-09-23 NOTE — DISCHARGE SUMMARY
[address]  Discharge- Leela Pop 1960, 62 y o  male MRN: [id number]  Unit/Bed#: E4 -01 Encounter: [id number]  Primary Care Provider: Karli Gramajo DO   Date and time admitted to hospital: 9/22/2021  4:39 PM    * ACS (acute coronary syndrome) [facility])  Assessment & Plan  · Dyspnea on exertion with elevated troponin concerning for NSTEMI  · Type 1 NSTEMI  · Patient underwent cardiac catheterization revealed severe left main disease, 90% ostial LAD, 90% ostial circumflex, 90% diagonal stenosis, 2 obtuse marginal vessels, left ventricular ejection fraction 37%  · Discussed with Cardiology patient will need to go to [facility] for CT surgery evaluation  · Patient in agreement  · Resume heparin drip 4 hours after TR band is completely removed  · Continue aspirin, losartan, metoprolol, statin    Hyperlipidemia  Assessment & Plan  · Continue high-dose statin  Essential hypertension  Assessment & Plan  · Continue losartan   · HCTZ on hold    Type 2 diabetes mellitus with diabetic polyneuropathy, without long-term current use of insulin [facility])  Assessment & Plan  Lab Results   Component Value Date    HGBA1C 6 9 (H) 07/20/2021       Recent Labs     09/22/21  2348 09/23/21  0711   POCGLU 99 98       · Continue decreased dose of glargine 20 units  · Monitor Accu-Cheks, sliding scale for coverage 5Will decrease glargine from 36 units to 20 units as patient will be NPO after midnight  Sliding scale insulin added      Transition of Care Discharge Summary - [facility]  Internal Medicine    Patient Information: Leela Lim 62 y o  male MRN: [id number]  Unit/Bed#: E4 -01 Encounter: [id number]    Discharging Physician / Practitioner: Krishna Carrero MD  PCP: Karli Gramajo DO  Admission Date: 9/22/2021  Discharge Date: 09/23/21    Disposition:      Inpatient [address] at [facility]      Reason for Admission:  Chest    Discharge Diagnoses: Principal Problem:    ACS (acute coronary syndrome) ([facility])  Active Problems:    Type 2 diabetes mellitus with diabetic polyneuropathy, without long-term current use of insulin ([address] )    Essential hypertension    Hyperlipidemia  Resolved Problems:    * No resolved hospital problems  *      Consultations During Hospital Stay:  · IP CONSULT TO CARDIOLOGY      Procedures Performed:     · Cardiac catheterization    Medication Adjustments and Discharge Medications:  · Medication Dosing Tapers - Please refer to Discharge Medication List for details on any medication dosing tapers (if applicable to patient)  · Discharge Medication List: See after visit summary for reconciled discharge medications  Wound Care Recommendations:  When applicable, please see wound care section of After Visit Summary  Diet Recommendations at Discharge:  Diet -        Diet Orders   (From admission, onward)             Start     Ordered    09/23/21 0952  Diet Cardiac  Diet effective now     Question Answer Comment   Diet Type Cardiac    RD to adjust diet per protocol? Yes        09/23/21 0951              Fluid Restriction - No Fluid Restriction at Discharge  Significant Findings / Test Results:       Current chest x-ray negative for acute cardiopulmonary disease    Hospital Course:     Julian Meng is a 62 y o  male patient who originally presented to the hospital on 9/22/2021 due to chest pain  Patient has history of diabetes mellitus, hypertension, hyperlipidemia  Patient underwent cardiac catheterization found to have multi-vessel disease will need to be transferred to [facility] for CT surgery evaluation  Discussed with accepting physician Dr Dorena Oppenheim  Please see above problem list for further details        Condition at Discharge: good     Discharge Day Visit / Exam:     Subjective:  Patient seen examined at bedside, denies any chest pain    Vitals: Blood Pressure: 154/70 (09/23/21 1506)  Pulse: 61 (09/23/21 1506)  Temperature: 97 5 °F (36 4 °C) (09/23/21 1506)  Temp Source: Temporal (09/23/21 1506)  Respirations: 18 (09/23/21 1506)  Height: 5' 7" (170 2 cm) (09/22/21 2347)  Weight - Scale: 92 9 kg (204 lb 12 9 oz) (09/22/21 2347)  SpO2: 97 % (09/23/21 1506)    Physical Exam:    Constitutional: Patient is oriented to person, place and time, no acute distress  HEENT:  Normocephalic, atraumatic  Cardiovascular: Normal S1S2, RRR, No murmurs/rubs/gallops appreciated  Pulmonary:  Bilateral air entry, No rhonchi/rales/wheezing appreciated  Abdominal: Soft, Bowel sounds present, Non-tender, Non-distended  Extremities:  No cyanosis, clubbing or edema  Neurological: Cranial nerves II-XII grossly intact, sensation intact, otherwise no focal neurological symptoms  Discharge instructions/Information to patient and family:   See after visit summary section titled Discharge Instructions for information provided to patient and family  Planned Readmission: no     Discharge Statement:  I spent 35 minutes discharging the patient  This time was spent on the day of discharge  I had direct contact with the patient on the day of discharge  Greater than 50% of the total time was spent examining patient, answering all patient questions, arranging and discussing plan of care with patient as well as directly providing post-discharge instructions  Additional time then spent on discharge activities      ** Please Note: This note has been constructed using a voice recognition system **

## 2021-09-23 NOTE — ASSESSMENT & PLAN NOTE
· Dyspnea on exertion with elevated troponin concerning for NSTEMI  · Has had URI symptoms for last 2-3 weeks concerning for viral syndrome  · Differentials include:  · NSTEMI  Cardiology recommending heparin infusion, adding aspirin and statin, and loading with Brilinta with plans for cardiac catheterization tomorrow  · Rayray/pericarditis    Echocardiogram ordered    Results from last 7 days   Lab Units 09/22/21 2012 09/22/21  1716   TROPONIN I ng/mL 1 70* 1 66*

## 2021-09-23 NOTE — ASSESSMENT & PLAN NOTE
Chief Complaint   Patient presents with   • Coronary Artery Disease     follow up       Subjective:   Kaleb Yanes is a 67 y.o. 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  They are all healthy and happy.  No limitations.  Lots of snow.  Wearing shorts today and has a positive attitude    Has a treadmill no setbacks does it all the time compliant on his statin and aspirin    Past Medical History:   Diagnosis Date   • CAD (coronary artery disease)    • Hyperlipidemia    • Hypertension      Past Surgical History:   Procedure Laterality Date   • ZZZ CARDIAC CATH  8/16/07    BMS to Circ   • OTHER CARDIAC SURGERY      stent x 1 aug 07     Family History   Problem Relation Age of Onset   • Heart Failure Neg Hx    • Heart Disease Neg Hx      Social History     Social History   • Marital status:      Spouse name: N/A   • Number of children: N/A   • Years of education: N/A     Occupational History   • Not on file.     Social History Main Topics   • Smoking status: Former Smoker     Years: 30.00     Quit date: 8/16/2007   • Smokeless tobacco: Never Used      Comment: quit aug 15 07   • Alcohol use Yes      Comment: occasional   • Drug use: No   • Sexual activity: Not on file     Other Topics Concern   • Not on file     Social History Narrative   • No narrative on file     Allergies   Allergen Reactions   • Nkda [No Known Drug Allergy]      Outpatient Encounter Prescriptions as of 3/28/2019   Medication Sig Dispense Refill   • rosuvastatin (CRESTOR) 40 MG tablet Take 1 Tab by mouth every day. 90 Tab 3   • simvastatin (ZOCOR) 40 MG Tab Take 1 Tab by mouth every evening. Needs to be seen for further refills. Thank you 90 Tab 0   • ASPIRIN 81 MG PO TABS 81 mg by Oral route QDAILY (RT).        No  Lab Results   Component Value Date    HGBA1C 6 9 (H) 07/20/2021       No results for input(s): POCGLU in the last 72 hours  · Will decrease glargine from 36 units to 20 units as patient will be NPO after midnight  Sliding scale insulin added  "facility-administered encounter medications on file as of 3/28/2019.      Review of Systems   Constitutional: Negative for chills and fever.   HENT: Negative for congestion.    Eyes: Negative for blurred vision, pain and discharge.   Respiratory: Negative for cough, hemoptysis and wheezing.    Cardiovascular: Negative for chest pain and palpitations.   Gastrointestinal: Negative for abdominal pain, nausea and vomiting.   Musculoskeletal: Negative for joint pain and myalgias.   Skin: Negative for itching and rash.   Neurological: Negative for speech change and loss of consciousness.   Endo/Heme/Allergies: Negative for environmental allergies. Does not bruise/bleed easily.   Psychiatric/Behavioral: Negative for depression. The patient is not nervous/anxious.    All other systems reviewed and are negative.       Objective:   /80 (BP Location: Left arm, Patient Position: Sitting, BP Cuff Size: Adult)   Pulse 82   Ht 1.702 m (5' 7\")   Wt 68 kg (150 lb)   SpO2 98%   BMI 23.49 kg/m²     Physical Exam   Constitutional: He is oriented to person, place, and time. He appears well-developed and well-nourished.   HENT:   Head: Normocephalic and atraumatic.   Eyes: Pupils are equal, round, and reactive to light. EOM are normal.   Neck: No JVD present. No thyromegaly present.   Cardiovascular: Normal rate, regular rhythm and intact distal pulses.    No murmur heard.  Abdominal: Bowel sounds are normal. He exhibits no distension.   Musculoskeletal: He exhibits no edema or tenderness.   Lymphadenopathy:     He has no cervical adenopathy.   Neurological: He is alert and oriented to person, place, and time. He exhibits normal muscle tone. Coordination normal.   Skin: Skin is warm and dry.   Psychiatric: He has a normal mood and affect. His behavior is normal.       Assessment:     1. Dyslipidemia     2. S/P coronary artery stent placement     3. Coronary artery disease involving native coronary artery of native heart " without angina pectoris         Medical Decision Making:  Today's Assessment / Status / Plan:       Known coronary disease  No history of surgery but did have intervention  Looked at images of his stress test from 2016 talked about asymptomatic stress testing and people in his situation every 3-5 years per national guidelines  Looked at lab work excellent every year we will repeat it no evidence of hyperlipidemia  Intolerant of beta-blockade due to fatigue and low blood pressure no indication at this point so many years after his MI    Watchful waiting talked about symptom driven testing as well  Follow-up in 1 year sooner with concerns  As always we talked about surveillance diet and exercise and sleep apnea

## 2021-09-23 NOTE — H&P
[address] [facility]  H&P- Ray Chad 1960, 62 y o  male MRN: [id number]  Unit/Bed#: [facility] 404-01 Encounter: [id number]  Primary Care Provider: Bhupendra Hernandez DO   Date and time admitted to hospital: 9/23/2021  5:17 PM    * ACS (acute coronary syndrome) [facility])  Assessment & Plan  Acute coronary syndrome  Presented with dyspnea on exertion with elevated troponins concerning for NSTEMI  Patient underwent cardiac catheterization revealing severe left main disease, 90% ostial LAD, 90% ostial circumflex, 90% diagonal stenosis, 2 obtuse marginal vessels, left ventricular ejection fraction of 37%  Discuss with Cardiology patient needs [facility] for CT surgery evaluation  Plan to admit to [facility]  Telemetry monitoring  Resume heparin drip for hours after TR band is completely removed  Continue with aspirin, losartan, metoprolol, statin  Patient has anxiety about surgery, would like wife and family present before making decision   CT surgery consulted    Hyperlipidemia  Assessment & Plan  Continue with high-dose statin    Anemia  Assessment & Plan  Patient has anemia, now 11 9 today  Continue to monitor closely     Essential hypertension  Assessment & Plan  Continue with losartan  Hydrochlorothiazide on hold       Type 2 diabetes mellitus with diabetic polyneuropathy, without long-term current use of insulin [facility])  Assessment & Plan  Lab Results   Component Value Date    HGBA1C 6 9 (H) 07/20/2021       Recent Labs     09/22/21  2348 09/23/21  0711 09/23/21  1104   POCGLU 99 98 103       Blood Sugar Average: Last 72 hrs:   currently on 20 units    Restart his home dose of 36 units of glargine at discharge  Sliding scale  Avoid hypoglycemia          VTE Prophylaxis: Heparin Drip  / sequential compression device   Code Status: full code   POLST: POLST is not applicable to this patient  Discussion with family:      Anticipated Length of Stay:  Patient will be admitted on an Inpatient basis with an anticipated length of stay of  More than 2 midnights  Justification for Hospital Stay: needs CT surgery evaluation for CABG  Total Time for Visit, including Counseling / Coordination of Care: 60 minutes  Greater than 50% of this total time spent on direct patient counseling and coordination of care  Chief Complaint:   BOLDEN    History of Present Illness:    Leslee Barksdale is a 81-year-old male with a history of diabetes hypertension hyperlipidemia, who originally presented to the hospital on 09/22 due to [address]  During the work up he was found to have EMPERATRIZ score of 4 and was noted to have elevated cardiac enzymes  He underwent catheterization [place], which revealed left main disease, 90% ostial LAD, 90% ostial circumflex, 90% diagonal stenosis, 2 obtuse marginal vessels, left ventricular ejection fraction of 37%  Cardiology and [facility] discussed with CT surgery and patient was transferred for evaluation and possible CABG  Review of Systems:    Review of Systems   Constitutional: Positive for fatigue  HENT: Negative  Eyes: Negative  Respiratory: Negative  Cardiovascular: Negative  Gastrointestinal: Negative  Endocrine: Negative  Genitourinary: Negative  Musculoskeletal: Negative  Skin: Negative  Neurological: Negative  Hematological: Negative  Psychiatric/Behavioral: Negative  Past Medical and Surgical History:     Past Medical History:   Diagnosis Date   • Diabetes mellitus ([address] )    • Hypertension        Past Surgical History:   Procedure Laterality Date   • CARDIAC CATHETERIZATION  9/23/2021   • INCISION AND DRAINAGE OF WOUND Right 9/8/2019    Procedure: INCISION AND DRAINAGE (I&D) EXTREMITY;  Surgeon: Mario Chinchilla DPM;  Location: [facility];  Service: Podiatry       Meds/Allergies:    Prior to Admission medications    Medication Sig Start Date End Date Taking?  Authorizing Provider   cholecalciferol (VITAMIN D3) 1,000 units "tablet Take 5,000 Units by mouth daily    Historical Provider, MD CRISTINA SOLOSTAR 100 units/mL injection pen 36 Units daily at bedtime  3/7/20   Historical Provider, MD   losartan-hydrochlorothiazide (HYZAAR) 100-25 MG per tablet Take 1 tablet by mouth daily     Historical Provider, MD   metFORMIN (GLUCOPHAGE) 1000 MG tablet Take 1,000 mg by mouth 2 (two) times a day with meals    Historical Provider, MD   rosuvastatin (CRESTOR) 5 mg tablet Take 5 mg by mouth MWF three times a week    Historical Provider, MD     I have reviewed home medications with patient personally  Allergies: No Known Allergies    Social History:     Marital Status: /Civil Union   Occupation:    Patient Pre-hospital Living Situation: lives at home   Patient Pre-hospital Level of Mobility: able to ambulate   Patient Pre-hospital Diet Restrictions: diabetic   Substance Use History:   Social History     Substance and Sexual Activity   Alcohol Use Yes   • Alcohol/week: 30 0 standard drinks   • Types: 30 Cans of beer per week    Comment: 5-6 beers 4-5 x per week      Social History     Tobacco Use   Smoking Status Never Smoker   Smokeless Tobacco Never Used     Social History     Substance and Sexual Activity   Drug Use Never       Family History:    No family history on file  Physical Exam:     Vitals:   Blood Pressure: 151/86 (09/23/21 1720)  Pulse: 68 (09/23/21 1720)  Temperature: 98 6 °F (37 °C) (09/23/21 1720)  Temp Source: Oral (09/23/21 1720)  Respirations: 18 (09/23/21 1720)  Height: 5' 7\" (170 2 cm) (09/23/21 1720)  Weight - Scale: 93 3 kg (205 lb 11 oz) (09/23/21 1720)  SpO2: 96 % (09/23/21 1720)    Physical Exam  Vitals and nursing note reviewed  Constitutional:       General: He is not in acute distress  Appearance: Normal appearance  He is not ill-appearing, toxic-appearing or diaphoretic  HENT:      Head: Normocephalic and atraumatic  Eyes:      General: No scleral icterus  Right eye: No discharge          " Left eye: No discharge  Extraocular Movements: Extraocular movements intact  Conjunctiva/sclera: Conjunctivae normal       Pupils: Pupils are equal, round, and reactive to light  Cardiovascular:      Rate and Rhythm: Normal rate  Pulses: Normal pulses  Heart sounds: No murmur heard  No friction rub  No gallop  Pulmonary:      Effort: Pulmonary effort is normal       Breath sounds: Normal breath sounds  Abdominal:      General: Abdomen is flat  Bowel sounds are normal       Palpations: Abdomen is soft  Musculoskeletal:         General: No swelling, tenderness, deformity or signs of injury  Normal range of motion  Cervical back: Normal range of motion and neck supple  No rigidity  Right lower leg: No edema  Left lower leg: No edema  Skin:     General: Skin is warm and dry  Coloration: Skin is not jaundiced or pale  Findings: No bruising or erythema  Neurological:      General: No focal deficit present  Mental Status: He is alert and oriented to person, place, and time  Mental status is at baseline  Cranial Nerves: No cranial nerve deficit  Sensory: No sensory deficit  Motor: No weakness  Coordination: Coordination normal    Psychiatric:         Mood and Affect: Mood normal          Behavior: Behavior normal          Thought Content: Thought content normal          Judgment: Judgment normal               Additional Data:     Lab Results: I have personally reviewed pertinent reports        Results from last 7 days   Lab Units 09/23/21  0219 09/22/21  1716   WBC Thousand/uL 7 03 7 47   HEMOGLOBIN g/dL 11 9* 13 1   HEMATOCRIT % 35 9* 39 4   PLATELETS Thousands/uL 250 287   NEUTROS PCT %  --  66   LYMPHS PCT %  --  21   MONOS PCT %  --  10   EOS PCT %  --  2     Results from last 7 days   Lab Units 09/23/21  0219   SODIUM mmol/L 143   POTASSIUM mmol/L 3 6   CHLORIDE mmol/L 107   CO2 mmol/L 25   BUN mg/dL 21   CREATININE mg/dL 1 09   ANION GAP mmol/L 11   CALCIUM mg/dL 8 8   ALBUMIN g/dL 3 5   TOTAL BILIRUBIN mg/dL 0 57   ALK PHOS U/L 74   ALT U/L 27   AST U/L 20   GLUCOSE RANDOM mg/dL 91         Results from last 7 days   Lab Units 09/23/21  1104 09/23/21  0711 09/22/21  2348   POC GLUCOSE mg/dl 103 98 99               Imaging: I have personally reviewed pertinent reports  No orders to display       EKG, Pathology, and Other Studies Reviewed on Admission:   · EKG:  ST depression in lateral leads, TWI in lateral leads, regular, rate of 84 bpm  ECHO: EF of 38%, aortic valve sclerosis with leaflet thickening and some focal calcification  Allscripts / Epic Records Reviewed: Yes     ** Please Note: This note has been constructed using a voice recognition system   **

## 2021-09-23 NOTE — ASSESSMENT & PLAN NOTE
Lab Results   Component Value Date    HGBA1C 6 9 (H) 07/20/2021       Recent Labs     09/22/21  2348 09/23/21  0711 09/23/21  1104   POCGLU 99 98 103       Blood Sugar Average: Last 72 hrs:   currently on 20 units    Restart his home dose of 36 units of glargine at discharge  Sliding scale  Avoid hypoglycemia

## 2021-09-23 NOTE — PROGRESS NOTES
CARDIOLOGY INPATIENT FOLLOW-UP PROGRESS NOTE  *-*-*-*-*-*-*-*-*-*-*-*-*-*-*-*-*-*-*-*-*-*-*-*-*-*-*-*-*-*-*-*-*-*-*-*-*-*-*-*-*-*-*-*-*-*-*-*-*-*-*-*-*-*-  [id number] DATE: 09/23/21 10:55 AM   AUTHOR: Eartha Kocher, MD  PATIENT: Justino Robles   1960    [id number]   62 y o    male  INPATIENT HOSPITALIST PHYSICIAN: Kacie Jain DO  DATE OF ADMISSION: 9/22/2021  4:39 PM; LENGTH OF STAY: 1 days  *-*-*-*-*-*-*-*-*-*-*-*-*-*-*-*-*-*-*-*-*-*-*-*-*-*-*-*-*-*-*-*-*-*-*-*-*-*-*-*-*-*-*-*-*-*-*-*-*-*-*-*-*-*-    Patient has newly diagnosed cardiomyopathy with reduced ejection fraction but without signs of congestive heart failure at the time of my evaluation  CARDIOLOGY ASSESSMENT  ACS- Type 1 MI  Severe double-vessel coronary artery disease plus left main disease  Ischemic cardiomyopathy with probably moderately reduced left ventricular systolic function  Diabetes mellitus  Essential hypertension  Anemia  Dyslipidemia  Significant alcohol history    Patient clinically stable  Cardiac catheterization today showed severe left main disease along with 90% ostial LAD, 90% ostial circumflex, 90% diagonal stenosis, 2 obtuse marginal vessels  Left ventricular ejection fraction was decreased at a around 37%  There was significant anterior regional wall motion abnormality  On clinical examination there is no evidence of pulmonary vascular congestion  Reviewed findings of cardiac catheterization with patient and his wife  Discussed recommendations for evaluation for coronary artery bypass grafting and transferred to [facility] for that  Is agreeable  Informed cardiac surgery team at [facility]  Discussed with me medicine attending and transfer to [facility] has been initiated     Patient Active Problem List   Diagnosis    Type 2 diabetes mellitus with diabetic polyneuropathy, without long-term current use of insulin ([facility] )    Essential hypertension    Anemia    ACS (acute coronary syndrome) ([facility] ) Hyperlipidemia        PLAN:  -- continue aspirin, losartan and metoprolol  Increased atorvastatin to 80 mg daily  Brilinta has been discontinued  Brilinta has been discontinued  -- TR band removal as per protocol  -- recommend restarting heparin drip 4 hours after TR band this completely removed and patient has no bleeding complications  -- transferred to [facility] when bed available  -- CHF diet and monitoring   -- patient needs workup for anemia  Should have iron studies and occult blood loss testing   -- optimization of diabetes regimen  -- will need cardiology follow-up also when at [facility]  *-*-*-*-*-*-*-*-*-*-*-*-*-*-*-*-*-*-*-*-*-*-*-*-*-*-*-*-*-*-*-*-*-*-*-*-*-*-*-*-*-*-*-*-*-*-*-*-*-*-*-*-*-*-  INTERVAL CHANGES / HISTORY OF PRESENT ILLNESS:  No acute events overnight  Patient has been chest pain-free since presentation  Denies any shortness of breath or dizziness or lightheadedness  Cardiac catheterization performed result summarized below  *-*-*-*-*-*-*-*-*-*-*-*-*-*-*-*-*-*-*-*-*-*-*-*-*-*-*-*-*-*-*-*-*-*-*-*-*-*-*-*-*-*-*-*-*-*-*-*-*-*-*-*-*-*  REVIEW OF SYMPTOMS:    Positive for:  As noted above in HPI  Negative for: All remaining as reviewed below and in HPI   SYSTEM SYMPTOMS REVIEWED:  General--weight change, fever, night sweats  Respiratoryl-- Wheezing, shortness of breath, cough, URI symptoms, sputum, blood  Cardiovascular--chest pain, syncope, dyspnea on exertion, edema, decline in exercise tolerance, claudication   Gastrointestinal--persistent vomiting, diarrhea, abdominal distention, blood in stool   Muscular or skeletal--joint pain or swelling   Neurologic--headaches, syncope, abnormal movement  Hematologic--history of easy bruising and bleeding   Endocrine--thyroid enlargement, heat or cold intolerance, polyuria   Psychiatric--anxiety, depression *-*-*-*-*-*-*-*-*-*-*-*-*-*-*-*-*-*-*-*-*-*-*-*-*-*-*-*-*-*-*-*-*-*-*-*-*-*-*-*-*-*-*-*-*-*-*-*-*-*-*-*-*-*-  VITAL SIGNS:  Vitals:    21 0712 21 1015 21 1030 21 1045   BP: 119/56 137/77 124/68 130/76   BP Location: Left arm Left arm Left arm Left arm   Pulse: 67 65 60 60   Resp: 16 18 18 18   Temp: (!) 97 2 °F (36 2 °C)      TempSrc: Temporal      SpO2: 97%      Weight:       Height:          Temp (24hrs), Av 6 °F (36 4 °C), Min:97 2 °F (36 2 °C), Max:98 3 °F (36 8 °C)  Current: Temperature: (!) 97 2 °F (36 2 °C)    Weight (last 2 days)       Date/Time   Weight    21 2347   92 9 (204 81)    21 1522   95 (209 44)              Body mass index is 32 08 kg/m²  Wt Readings from Last 3 Encounters:   21 92 9 kg (204 lb 12 9 oz)   20 90 3 kg (199 lb)   19 90 7 kg (199 lb 15 3 oz)    No intake or output data in the 24 hours ending 21 1055     *-*-*-*-*-*-*-*-*-*-*-*-*-*-*-*-*-*-*-*-*-*-*-*-*-*-*-*-*-*-*-*-*-*-*-*-*-*-*-*-*-*-*-*-*-*-*-*-*-*-*-*-*-*-  PHYSICAL EXAM:  General Appearance:    Alert, cooperative, no distress, appears stated age   Head, Eyes, ENT:    No obvious abnormality, moist mucous mebranes  Neck:   Supple, no carotid bruit or JVD   Back:     Symmetric, no curvature  Lungs:     Respirations unlabored  Clear to auscultation bilaterally,    Chest wall:    No tenderness or deformity   Heart:    Regular rate and rhythm, S1 and S2 normal, no murmur, rub  or gallop  Abdomen:     Soft, non-tender, No obvious masses, or organomegaly   Extremities:   Extremities warm, no cyanosis or edema    Skin:   Skin color, texture, turgor normal, no rashes or lesions     *-*-*-*-*-*-*-*-*-*-*-*-*-*-*-*-*-*-*-*-*-*-*-*-*-*-*-*-*-*-*-*-*-*-*-*-*-*-*-*-*-*-*-*-*-*-*-*-*-*-*-*-*-*-  TELEMETRY, LAST ECG:  Telemetry reviewed    Sinus rhythm with sinus bradycardia overnight with heart rate in high 40s  no significant ectopy or tachyarrhythmia   Results for orders placed or performed during the hospital encounter of 09/22/21   ECG 12 lead   Result Value    Ventricular Rate 84    Atrial Rate 84    MO Interval 158    QRSD Interval 84    QT Interval 368    QTC Interval 434    P Axis 56    QRS Axis -16    T Wave Axis 98    Narrative    Normal sinus rhythm  Minimal voltage criteria for LVH, may be normal variant  Cannot rule out Septal infarct , age undetermined  Nonspecific ST and T wave abnormality  Abnormal ECG  When compared with ECG of 08-SEP-2019 06:19,  Possible Septal infarct is now Present  ST now depressed in Lateral leads  Nonspecific T wave abnormality no longer evident in Inferior leads  T wave inversion now evident in Lateral leads  Confirmed by Sergio Barros ([id number]) on 9/22/2021 3:36:21 PM      *-*-*-*-*-*-*-*-*-*-*-*-*-*-*-*-*-*-*-*-*-*-*-*-*-*-*-*-*-*-*-*-*-*-*-*-*-*-*-*-*-*-*-*-*-*-*-*-*-*-*-*-*-*-    CURRENT SCHEDULED MEDICATIONS:    Current Facility-Administered Medications:     acetaminophen (TYLENOL) tablet 650 mg, 650 mg, Oral, Q6H PRN, Juliana Niec, DO    aspirin (ECOTRIN LOW STRENGTH) EC tablet 81 mg, 81 mg, Oral, Daily, Uri Gomez DO, 81 mg at 09/23/21 0811    atorvastatin (LIPITOR) tablet 40 mg, 40 mg, Oral, QPM, Uri Gomez DO    heparin (porcine) 25,000 units in 0 45% NaCl 250 mL infusion (premix), 3-20 Units/kg/hr (Order-Specific), Intravenous, Titrated, Uri Gomez DO, Last Rate: 11 8 mL/hr at 09/23/21 0358, 13 1 Units/kg/hr at 09/23/21 0358    heparin (porcine) injection 2,000 Units, 2,000 Units, Intravenous, Q1H PRN, Juliana Arlet, DO, 2,000 Units at 09/23/21 0356    heparin (porcine) injection 4,000 Units, 4,000 Units, Intravenous, Q1H PRN, Uri Jason, DO    insulin glargine (LANTUS) subcutaneous injection 22 Units 0 22 mL, 22 Units, Subcutaneous, HS, Uri Gomez, , 22 Units at 09/23/21 0018    insulin lispro (HumaLOG) 100 units/mL subcutaneous injection 1-6 Units, 1-6 Units, Subcutaneous, 4x Daily (AC & HS) **AND** Fingerstick Glucose (POCT), , , 4x Daily AC and at bedtime, Mik Collier DO    losartan (COZAAR) tablet 100 mg, 100 mg, Oral, Daily, Uri Gomez DO, 100 mg at 09/23/21 0811    metoprolol tartrate (LOPRESSOR) tablet 25 mg, 25 mg, Oral, Q12H [facility], Uri Gomez DO, 25 mg at 09/23/21 4634    nitroglycerin (NITROSTAT) SL tablet 0 4 mg, 0 4 mg, Sublingual, Q5 Min PRN, Uri Gomez DO    ondansetron (ZOFRAN) injection 4 mg, 4 mg, Intravenous, Q6H PRN, Uri Gomez DO    oxyCODONE (ROXICODONE) IR tablet 5 mg, 5 mg, Oral, Q4H PRN, Uri Gomez DO    sodium chloride 0 9 % infusion, 50 mL/hr, Intravenous, Continuous, Jacques Oshea MD ALLERGIES:  No Known Allergies     *-*-*-*-*-*-*-*-*-*-*-*-*-*-*-*-*-*-*-*-*-*-*-*-*-*-*-*-*-*-*-*-*-*-*-*-*-*-*-*-*-*-*-*-*-*-*-*-*-*-*-*-*-*  LABORATORY DATA:  I have personally reviewed pertinent labs  CMP:  Results from last 7 days   Lab Units 09/23/21  0219 09/22/21  1716   SODIUM mmol/L 143 140   POTASSIUM mmol/L 3 6 4 3   CHLORIDE mmol/L 107 105   CO2 mmol/L 25 25   BUN mg/dL 21 20   CREATININE mg/dL 1 09 1 00   CALCIUM mg/dL 8 8 9 0   ALK PHOS U/L 74 84   ALT U/L 27 30   AST U/L 20 25               Cardiac Profile:   Results from last 7 days   Lab Units 09/23/21  0549 09/22/21 2012 09/22/21  1716   TROPONIN I ng/mL 1 87* 1 70* 1 66*   NT-PRO BNP pg/mL  --   --  2,025*                CBC:   Results from last 7 days   Lab Units 09/23/21  0219 09/22/21  1716   WBC Thousand/uL 7 03 7 47   HEMOGLOBIN g/dL 11 9* 13 1   HEMATOCRIT % 35 9* 39 4   PLATELETS Thousands/uL 250 287     PT/INR: No results found for: PT, INR, Microbiology:          *-*-*-*-*-*-*-*-*-*-*-*-*-*-*-*-*-*-*-*-*-*-*-*-*-*-*-*-*-*-*-*-*-*-*-*-*-*-*-*-*-*-*-*-*-*-*-*-*-*-*-*-*-*-  IMAGING STUDIES REPORTS: Imaging studies results reviewed    No valid procedures specified    No Chest XR results available for this patient  *-*-*-*-*-*-*-*-*-*-*-*-*-*-*-*-*-*-*-*-*-*-*-*-*-*-*-*-*-*-*-*-*-*-*-*-*-*-*-*-*-*-*-*-*-*-*-*-*-*-*-*-*-*-  ECHOCARDIOGRAM AND OTHER CARDIAC TESTS RESULTS:  Cardiac catheterization September 23, 2021 showed severe left main disease along with 90% ostial LAD, 90% ostial circumflex, 90% diagonal stenosis, 2 obtuse marginal vessels         *-*-*-*-*-*-*-*-*-*-*-*-*-*-*-*-*-*-*-*-*-*-*-*-*-*-*-*-*-*-*-*-*-*-*-*-*-*-*-*-*-*-*-*-*-*-*-*-*-*-*-*-*-*-  SIGNATURES:   @ABS@   Marshal Villegas MD

## 2021-09-24 ENCOUNTER — APPOINTMENT (INPATIENT)
Dept: NON INVASIVE DIAGNOSTICS | Facility: HOSPITAL | Age: 61
DRG: 235 | End: 2021-09-24
Payer: COMMERCIAL

## 2021-09-24 PROBLEM — I25.10 CAD (CORONARY ARTERY DISEASE): Status: ACTIVE | Noted: 2021-09-24

## 2021-09-24 LAB
ANION GAP SERPL CALCULATED.3IONS-SCNC: 3 MMOL/L (ref 4–13)
APTT PPP: 45 SECONDS (ref 23–37)
APTT PPP: 54 SECONDS (ref 23–37)
APTT PPP: 65 SECONDS (ref 23–37)
BUN SERPL-MCNC: 19 MG/DL (ref 5–25)
CALCIUM SERPL-MCNC: 8.6 MG/DL (ref 8.3–10.1)
CHLORIDE SERPL-SCNC: 108 MMOL/L (ref 100–108)
CO2 SERPL-SCNC: 30 MMOL/L (ref 21–32)
CREAT SERPL-MCNC: 0.99 MG/DL (ref 0.6–1.3)
ERYTHROCYTE [DISTWIDTH] IN BLOOD BY AUTOMATED COUNT: 13.1 % (ref 11.6–15.1)
GFR SERPL CREATININE-BSD FRML MDRD: 82 ML/MIN/1.73SQ M
GLUCOSE SERPL-MCNC: 100 MG/DL (ref 65–140)
GLUCOSE SERPL-MCNC: 109 MG/DL (ref 65–140)
GLUCOSE SERPL-MCNC: 158 MG/DL (ref 65–140)
GLUCOSE SERPL-MCNC: 95 MG/DL (ref 65–140)
GLUCOSE SERPL-MCNC: 95 MG/DL (ref 65–140)
HCT VFR BLD AUTO: 36.5 % (ref 36.5–49.3)
HGB BLD-MCNC: 11.9 G/DL (ref 12–17)
MAGNESIUM SERPL-MCNC: 2.4 MG/DL (ref 1.6–2.6)
MCH RBC QN AUTO: 29.3 PG (ref 26.8–34.3)
MCHC RBC AUTO-ENTMCNC: 32.6 G/DL (ref 31.4–37.4)
MCV RBC AUTO: 90 FL (ref 82–98)
PLATELET # BLD AUTO: 262 THOUSANDS/UL (ref 149–390)
PMV BLD AUTO: 9.7 FL (ref 8.9–12.7)
POTASSIUM SERPL-SCNC: 3.9 MMOL/L (ref 3.5–5.3)
RBC # BLD AUTO: 4.06 MILLION/UL (ref 3.88–5.62)
SODIUM SERPL-SCNC: 141 MMOL/L (ref 136–145)
WBC # BLD AUTO: 7.41 THOUSAND/UL (ref 4.31–10.16)

## 2021-09-24 PROCEDURE — 99232 SBSQ HOSP IP/OBS MODERATE 35: CPT | Performed by: INTERNAL MEDICINE

## 2021-09-24 PROCEDURE — 80048 BASIC METABOLIC PNL TOTAL CA: CPT | Performed by: PHYSICIAN ASSISTANT

## 2021-09-24 PROCEDURE — 82948 REAGENT STRIP/BLOOD GLUCOSE: CPT

## 2021-09-24 PROCEDURE — 93971 EXTREMITY STUDY: CPT

## 2021-09-24 PROCEDURE — 85730 THROMBOPLASTIN TIME PARTIAL: CPT | Performed by: PHYSICIAN ASSISTANT

## 2021-09-24 PROCEDURE — 83735 ASSAY OF MAGNESIUM: CPT | Performed by: PHYSICIAN ASSISTANT

## 2021-09-24 PROCEDURE — 93971 EXTREMITY STUDY: CPT | Performed by: SURGERY

## 2021-09-24 PROCEDURE — 93880 EXTRACRANIAL BILAT STUDY: CPT | Performed by: SURGERY

## 2021-09-24 PROCEDURE — 99223 1ST HOSP IP/OBS HIGH 75: CPT | Performed by: THORACIC SURGERY (CARDIOTHORACIC VASCULAR SURGERY)

## 2021-09-24 PROCEDURE — 85027 COMPLETE CBC AUTOMATED: CPT | Performed by: PHYSICIAN ASSISTANT

## 2021-09-24 PROCEDURE — 93880 EXTRACRANIAL BILAT STUDY: CPT

## 2021-09-24 PROCEDURE — 87081 CULTURE SCREEN ONLY: CPT | Performed by: PHYSICIAN ASSISTANT

## 2021-09-24 PROCEDURE — 85730 THROMBOPLASTIN TIME PARTIAL: CPT | Performed by: INTERNAL MEDICINE

## 2021-09-24 RX ORDER — INSULIN GLARGINE 100 [IU]/ML
18 INJECTION, SOLUTION SUBCUTANEOUS
Status: DISCONTINUED | OUTPATIENT
Start: 2021-09-24 | End: 2021-09-27 | Stop reason: HOSPADM

## 2021-09-24 RX ORDER — CHLORHEXIDINE GLUCONATE 0.12 MG/ML
15 RINSE ORAL EVERY 12 HOURS SCHEDULED
Status: DISCONTINUED | OUTPATIENT
Start: 2021-09-24 | End: 2021-09-27 | Stop reason: HOSPADM

## 2021-09-24 RX ADMIN — INSULIN GLARGINE 18 UNITS: 100 INJECTION, SOLUTION SUBCUTANEOUS at 21:09

## 2021-09-24 RX ADMIN — MUPIROCIN 1 APPLICATION.: 20 OINTMENT TOPICAL at 10:17

## 2021-09-24 RX ADMIN — INSULIN LISPRO 1 UNITS: 100 INJECTION, SOLUTION INTRAVENOUS; SUBCUTANEOUS at 12:34

## 2021-09-24 RX ADMIN — ATORVASTATIN CALCIUM 80 MG: 80 TABLET, FILM COATED ORAL at 17:30

## 2021-09-24 RX ADMIN — HEPARIN SODIUM 15.1 UNITS/KG/HR: 10000 INJECTION, SOLUTION INTRAVENOUS at 12:32

## 2021-09-24 RX ADMIN — MUPIROCIN 1 APPLICATION.: 20 OINTMENT TOPICAL at 21:09

## 2021-09-24 RX ADMIN — CHLORHEXIDINE GLUCONATE 15 ML: 1.2 SOLUTION ORAL at 21:09

## 2021-09-24 RX ADMIN — METOPROLOL TARTRATE 25 MG: 25 TABLET, FILM COATED ORAL at 21:09

## 2021-09-24 RX ADMIN — CHLORHEXIDINE GLUCONATE 15 ML: 1.2 SOLUTION ORAL at 10:17

## 2021-09-24 RX ADMIN — HEPARIN SODIUM 2000 UNITS: 1000 INJECTION INTRAVENOUS; SUBCUTANEOUS at 03:39

## 2021-09-24 RX ADMIN — METOPROLOL TARTRATE 25 MG: 25 TABLET, FILM COATED ORAL at 10:17

## 2021-09-24 RX ADMIN — ASPIRIN 81 MG: 81 TABLET, COATED ORAL at 10:17

## 2021-09-24 RX ADMIN — HEPARIN SODIUM 2000 UNITS: 1000 INJECTION INTRAVENOUS; SUBCUTANEOUS at 12:35

## 2021-09-24 NOTE — PROGRESS NOTES
[address] [facility]  Progress Note - Feliciano Arizmendi 1960, 62 y o  male MRN: [id number]  Unit/Bed#: [facility] 404-01 Encounter: [id number]  Primary Care Provider: Deniz Avila DO   Date and time admitted to hospital: 9/23/2021  5:17 PM    * ACS (acute coronary syndrome) [facility])  Assessment & Plan  Acute coronary syndrome  Presented with dyspnea on exertion with elevated troponins concerning for NSTEMI  Patient underwent cardiac catheterization revealing severe left main disease, 90% ostial LAD, 90% ostial circumflex, 90% diagonal stenosis, 2 obtuse marginal vessels, left ventricular ejection fraction of 37%  Discuss with Cardiology patient needs [facility] for CT surgery evaluation  Plan to admit to [facility]  Telemetry monitoring  Resume heparin drip for hours after TR band is completely removed    Continue with aspirin, losartan, metoprolol, statin  Patient has anxiety about surgery, would like wife and family present before making decision   CT surgery consulted    9/24/21: plan is for cabg early next week likely Tuesday, preoperative evaluation pending  On my exam patient has no complaints of chest pain or shortness of breath, he has ambulated to the bathroom and back without any exertional symptoms    -continue heparin drip  -hold brillinta  -continue aspirin, metoprolol and statin    Hyperlipidemia  Assessment & Plan  Continue with high-dose statin    Anemia  Assessment & Plan  Patient has anemia, now 11 9 today, stable from yesterday  Continue to monitor closely     Essential hypertension  Assessment & Plan  Continue with losartan  Continue to hold hctz      Type 2 diabetes mellitus with diabetic polyneuropathy, without long-term current use of insulin [facility])  Assessment & Plan  Lab Results   Component Value Date    HGBA1C 6 9 (H) 07/20/2021       Recent Labs     09/23/21  2117 09/24/21  0545 09/24/21  1034 09/24/21  1603   POCGLU 151* 95 158* 109       Blood Sugar Average: Last 72 hrs:  (P) 128 25 currently on 20 units  Restart his home dose of 36 units of glargine at discharge  Sliding scale  Avoid hypoglycemia              VTE Pharmacologic Prophylaxis: VTE Score: 5 High Risk (Score >/= 5) - Pharmacological DVT Prophylaxis Ordered: heparin drip  Sequential Compression Devices Ordered  Patient Centered Rounds: I performed bedside rounds with nursing staff today  Discussions with Specialists or Other Care Team Provider: n/a    Education and Discussions with Family / Patient: Updated  (wife) via phone  Time Spent for Care: 30 minutes  More than 50% of total time spent on counseling and coordination of care as described above  Current Length of Stay: 1 day(s)  Current Patient Status: Inpatient   Certification Statement: The patient will continue to require additional inpatient hospital stay due to cabg eval  Discharge Plan: Anticipate discharge in >72 hrs to home  Code Status: Level 1 - Full Code    Subjective:   Patient denies any complaints    Objective:     Vitals:   Temp (24hrs), Av °F (36 7 °C), Min:97 5 °F (36 4 °C), Max:98 5 °F (36 9 °C)    Temp:  [97 5 °F (36 4 °C)-98 5 °F (36 9 °C)] 98 4 °F (36 9 °C)  HR:  [59-70] 62  Resp:  [15-18] 16  BP: (132-156)/(71-90) 156/81  SpO2:  [90 %-98 %] 97 %  Body mass index is 32 22 kg/m²  Input and Output Summary (last 24 hours): Intake/Output Summary (Last 24 hours) at 2021 1739  Last data filed at 2021 1148  Gross per 24 hour   Intake 100 ml   Output 225 ml   Net -125 ml       Physical Exam:   Physical Exam  Vitals and nursing note reviewed  Constitutional:       General: He is not in acute distress  Appearance: Normal appearance  He is well-developed  He is not ill-appearing, toxic-appearing or diaphoretic  HENT:      Head: Normocephalic and atraumatic  Eyes:      General: No scleral icterus       Conjunctiva/sclera: Conjunctivae normal    Cardiovascular:      Rate and Rhythm: Normal rate and regular rhythm  Heart sounds: No murmur heard  No friction rub  Pulmonary:      Effort: Pulmonary effort is normal  No respiratory distress  Breath sounds: Normal breath sounds  No stridor  No wheezing, rhonchi or rales  Chest:      Chest wall: No tenderness  Abdominal:      General: There is no distension  Palpations: Abdomen is soft  There is no mass  Tenderness: There is no abdominal tenderness  There is no right CVA tenderness, left CVA tenderness, guarding or rebound  Hernia: No hernia is present  Musculoskeletal:         General: No swelling, tenderness, deformity or signs of injury  Cervical back: Neck supple  Right lower leg: Edema present  Left lower leg: Edema present  Skin:     General: Skin is warm and dry  Coloration: Skin is not jaundiced or pale  Findings: No bruising, erythema, lesion or rash  Neurological:      Mental Status: He is alert and oriented to person, place, and time            Additional Data:     Labs:  Results from last 7 days   Lab Units 09/24/21  0303 09/22/21  1716   WBC Thousand/uL 7 41 7 47   HEMOGLOBIN g/dL 11 9* 13 1   HEMATOCRIT % 36 5 39 4   PLATELETS Thousands/uL 262 287   NEUTROS PCT %  --  66   LYMPHS PCT %  --  21   MONOS PCT %  --  10   EOS PCT %  --  2     Results from last 7 days   Lab Units 09/24/21  0303 09/23/21  0219   SODIUM mmol/L 141 143   POTASSIUM mmol/L 3 9 3 6   CHLORIDE mmol/L 108 107   CO2 mmol/L 30 25   BUN mg/dL 19 21   CREATININE mg/dL 0 99 1 09   ANION GAP mmol/L 3* 11   CALCIUM mg/dL 8 6 8 8   ALBUMIN g/dL  --  3 5   TOTAL BILIRUBIN mg/dL  --  0 57   ALK PHOS U/L  --  74   ALT U/L  --  27   AST U/L  --  20   GLUCOSE RANDOM mg/dL 95 91         Results from last 7 days   Lab Units 09/24/21  1603 09/24/21  1034 09/24/21  0545 09/23/21  2117 09/23/21  1104 09/23/21  0711 09/22/21  2348   POC GLUCOSE mg/dl 109 158* 95 151* 103 98 99               Lines/Drains:  Invasive Devices     Peripheral Intravenous Line            Peripheral IV 09/24/21 <1 day                  Telemetry:  Telemetry Orders (From admission, onward)             48 Hour Telemetry Monitoring  Continuous x 48 hours     Question:  Reason for 48 Hour Telemetry  Answer:  Arrhythmias Requiring Medical Therapy (eg  SVT, Vtach/fib, Bradycardia, Uncontrolled A-fib)                 Telemetry Reviewed: Normal Sinus Rhythm  Indication for Continued Telemetry Use: Arrthymias requiring medical therapy           Imaging: Reviewed radiology reports from this admission including: chest xray    Recent Cultures (last 7 days):         Last 24 Hours Medication List:   Current Facility-Administered Medications   Medication Dose Route Frequency Provider Last Rate   • acetaminophen  650 mg Oral Q6H PRN Kamaljit Medrano MD     • aspirin  81 mg Oral Daily Kamaljit Medrano MD     • atorvastatin  80 mg Oral QPM Kamaljit Medrano MD     • chlorhexidine  15 mL Mouth/Throat Q12H [address] Ingris Santo PA-C     • heparin (porcine)  3-20 Units/kg/hr (Order-Specific) Intravenous Titrated Kamaljit Medrano MD 17 1 Units/kg/hr (09/24/21 1234)   • heparin (porcine)  2,000 Units Intravenous Q1H PRN Kamaljit Medrano MD     • heparin (porcine)  4,000 Units Intravenous Q1H PRN Kamaljit Medrano MD     • insulin glargine  22 Units Subcutaneous HS Kamaljit Medrano MD     • insulin lispro  1-6 Units Subcutaneous 4x Daily (AC & HS) Kamaljit Medrano MD     • metoprolol tartrate  25 mg Oral Q12H [address] Ingris Santo PA-C     • mupirocin  1 application Nasal N93B [address] Ingris Santo PA-C     • nitroglycerin  0 4 mg Sublingual Q5 Min PRN Kamaljit Medrano MD     • ondansetron  4 mg Intravenous Q6H PRN Kamaljit Medrano MD     • oxyCODONE  5 mg Oral Q4H PRN Kamaljit Medrano MD          Today, Patient Was Seen By: Mita Davis DO    **Please Note: This note may have been constructed using a voice recognition system  **

## 2021-09-24 NOTE — ASSESSMENT & PLAN NOTE
Lab Results   Component Value Date    HGBA1C 6 9 (H) 07/20/2021       Recent Labs     09/23/21  2117 09/24/21  0545 09/24/21  1034 09/24/21  1603   POCGLU 151* 95 158* 109       Blood Sugar Average: Last 72 hrs:  (P) 128 25 currently on 20 units    Restart his home dose of 36 units of glargine at discharge  Sliding scale  Avoid hypoglycemia

## 2021-09-24 NOTE — CASE MANAGEMENT
64yo male transferred from [facility] with NSTEMI and CAD  Plan is for CABG on 9/28/21  He is alert and oriented, independent ADLs, drives  Only DME is a RW he does not use (obtained after foot surgery 3 years ago)  He resides in [place] with his wife Donte Ochoa, phone [phone]  She is his HCA  They live in a 1 story home with 1 RAFAEL  No hx of HHC, rehab, denies mental illness and D&A problems  Discussed need for [facility] after surgery, options discussed, chose [facility], referral sent for postop nsg care  Pt  Does already give himself insulin  His PCP is Dr Parish Bethea and he uses [address] on [facility]   Pt  Has 2 sons who live nearby  Wife will transport home when ready  CM reviewed d/c planning process including the following: identifying help at home, patient preference for d/c planning needs, Discharge Lounge, [facility] Meds to Bed program, availability of treatment team to discuss questions or concerns patient and/or family may have regarding understanding medications and recognizing signs and symptoms once discharged  CM also encouraged patient to follow up with all recommended appointments after discharge  Patient advised of importance for patient and family to participate in managing patient’s medical well being  Patient/caregiver received discharge checklist  Content reviewed  Patient/caregiver encouraged to participate in discharge plan of care prior to discharge home

## 2021-09-24 NOTE — UTILIZATION REVIEW
Initial Clinical Review    Admission: Date/Time/Statement:   Admission Orders (From admission, onward)     Ordered        09/23/21 1737  Inpatient Admission  Once                   Orders Placed This Encounter   Procedures   • Inpatient Admission     Standing Status:   Standing     Number of Occurrences:   1     Order Specific Question:   Admitting Physician     Answer:   Nevaeh Gunn [[id number]]     Order Specific Question:   Level of Care     Answer:   Med Surg [[id number]]     Order Specific Question:   Estimated length of stay     Answer:   More than 2 Midnights     Order Specific Question:   Certification     Answer:   I certify that inpatient services are medically necessary for this patient for a duration of greater than two midnights  See H&P and MD Progress Notes for additional information about the patient's course of treatment  Initial Presentation: 63 y/o male with PMhx of DM, HTN, HLD admitted inpatient to [facility] unit with multivessel disease and need for CTSx eval   Pt initially presented to [facility] with sob, underwent cardiac cath revealing  left main disease, 90% ostial LAD, 90% ostial circumflex, 90% diagonal stenosis, 2 obtuse marginal vessels, left ventricular ejection fraction of 37%  Transferred to [facility] for CTSx eval  Consult cardiology and CTSx  Continue with aspirin, losartan, metoprolol, statin  Resume heparin drip for hours after TR band is completely removed  CTSx 9/24 -- after discussion with pt today, wishes to proceed with further workup and ultimately surgical intervention  Routine preoperative testing has been ordered to include carotid duplex and vein mapping    Pending the results of these tests, they will be scheduled for surgery      Vital Signs:   Date/Time  Temp  Pulse  Resp  BP  MAP (mmHg)  SpO2  O2 Device   09/24/21 16:05:05  98 4 °F (36 9 °C)  62  16  156/81  106  97 %  --   09/24/21 11:48:07  98 3 °F (36 8 °C)  64  15  132/71  91  98 %  --   09/24/21 10:20:35  --  70  -- 145/89  108  96 %  --   09/24/21 07:13:34  97 5 °F (36 4 °C)  62  15  146/90  109  90 %  --   09/24/21 02:16:28  97 5 °F (36 4 °C)  59  18  146/89  108  97 %  --   09/23/21 22:37:53  97 5 °F (36 4 °C)  60  --  144/89  107  97 %  --   09/23/21 2000  --  --  --  --  --  95 %  None (Room air)   09/23/21 19:29:27  98 5 °F (36 9 °C)  65  18  151/83  106  95 %  --   09/23/21 1730  --  --  --  --  --  96 %  --   09/23/21 1720  98 6 °F (37 °C)  68  18  151/86  108  96 %  None (Room air)       Pertinent Labs/Diagnostic Test Results:   CXR 9/23 -- No acute cardiopulmonary disease       Results from last 7 days   Lab Units 09/22/21  1707   SARS-COV-2  Negative     Results from last 7 days   Lab Units 09/24/21  0303 09/23/21  0219 09/22/21  1716   WBC Thousand/uL 7 41 7 03 7 47   HEMOGLOBIN g/dL 11 9* 11 9* 13 1   HEMATOCRIT % 36 5 35 9* 39 4   PLATELETS Thousands/uL 262 250 287   NEUTROS ABS Thousands/µL  --   --  4 93     Results from last 7 days   Lab Units 09/24/21  0303 09/23/21  0219 09/22/21  1716   SODIUM mmol/L 141 143 140   POTASSIUM mmol/L 3 9 3 6 4 3   CHLORIDE mmol/L 108 107 105   CO2 mmol/L 30 25 25   ANION GAP mmol/L 3* 11 10   BUN mg/dL 19 21 20   CREATININE mg/dL 0 99 1 09 1 00   EGFR ml/min/1 73sq m 82 73 81   CALCIUM mg/dL 8 6 8 8 9 0   MAGNESIUM mg/dL 2 4  --   --      Results from last 7 days   Lab Units 09/23/21  0219 09/22/21  1716   AST U/L 20 25   ALT U/L 27 30   ALK PHOS U/L 74 84   TOTAL PROTEIN g/dL 6 5 7 5   ALBUMIN g/dL 3 5 4 0   TOTAL BILIRUBIN mg/dL 0 57 0 67     Results from last 7 days   Lab Units 09/24/21  1603 09/24/21  1034 09/24/21  0545 09/23/21  2117 09/23/21  1104 09/23/21  0711 09/22/21  2348   POC GLUCOSE mg/dl 109 158* 95 151* 103 98 99     Results from last 7 days   Lab Units 09/24/21  0303 09/23/21  0219 09/22/21  1716   GLUCOSE RANDOM mg/dL 95 91 99     Results from last 7 days   Lab Units 09/23/21  0549 09/22/21 2012 09/22/21  1716   TROPONIN I ng/mL 1 87* 1 70* 1 66* Results from last 7 days   Lab Units 09/24/21  1014 09/24/21  0258 09/23/21  0219   PTT seconds 45* 54* 46*       Results from last 7 days   Lab Units 09/22/21  1716   NT-PRO BNP pg/mL 2,025*           Past Medical History:   Diagnosis Date   • Diabetes mellitus ([place] [address] )    • Hypertension      Present on Admission:  • ACS (acute coronary syndrome) ([place] [address] )  • Type 2 diabetes mellitus with diabetic polyneuropathy, without long-term current use of insulin ([facility])  • Essential hypertension  • Anemia  • Hyperlipidemia      Admitting Diagnosis: ACS (acute coronary syndrome) ([place] [address] ) [I24 9]  Age/Sex: 62 y o  male  Admission Orders:  Scheduled Medications:  aspirin, 81 mg, Oral, Daily  atorvastatin, 80 mg, Oral, QPM  chlorhexidine, 15 mL, Mouth/Throat, Q12H [address]  insulin glargine, 22 Units, Subcutaneous, HS  insulin lispro, 1-6 Units, Subcutaneous, 4x Daily (AC & HS)  metoprolol tartrate, 25 mg, Oral, Q12H [place]  mupirocin, 1 application, Nasal, S83M [address]    Continuous IV Infusions:  heparin (porcine), 3-20 Units/kg/hr (Order-Specific), Intravenous, Titrated    PRN Meds:  acetaminophen, 650 mg, Oral, Q6H PRN  heparin (porcine), 2,000 Units, Intravenous, Q1H PRN  heparin (porcine), 4,000 Units, Intravenous, Q1H PRN  nitroglycerin, 0 4 mg, Sublingual, Q5 Min PRN  ondansetron, 4 mg, Intravenous, Q6H PRN  oxyCODONE, 5 mg, Oral, Q4H PRN        Network Utilization Review Department  ATTENTION: Please call with any questions or concerns to [phone] and carefully listen to the prompts so that you are directed to the right person  All voicemails are confidential   Debra Garcia all requests for admission clinical reviews, approved or denied determinations and any other requests to dedicated fax number below belonging to the campus where the patient is receiving treatment   List of dedicated fax numbers for the Facilities:  [address] [place] [address] DENIALS (Administrative/Medical Necessity) [phone]   [address] [place] [address] (Maternity/NICU/Pediatrics) Latesha Osullivan [address] [place] [address] [place] [address] [address] Kristi Hernandez [address] [facility] [address] [phone]

## 2021-09-24 NOTE — CONSULTS
Consultation - Cardiothoracic Surgery   Anupama Frances 62 y o  male MRN: [id number]  Unit/Bed#: [facility] 404-01 Encounter: [id number]      History of Present Illness   Physician Requesting Consult: Alesha Cox DO  Reason for Consult / Principal Problem: MV CAD  HPI: Anupama Frances is a 62y o  year old male who presents with presents for evaluation of MV CAD  Patient states he has not been feeling well for past 3 months  C/o increasing fatiguability, frequent SOB & overall malaise  Over past month feels like he has had a chest cold w/ frequent phlegm production  Called PCP who advised trip to ED  Presented to [facility] ED 9/22 w/ these complaints  EKG w/ non-specific ST changes, (+) troponins  Dx NSTEMI & admitted for w/u  TTE w/ decreased EF & no valvular dysfunction  Cath w/ MV CAD  Transferred to [facility] for cardiac sx consultation  (+) Brilinta 180mg 9/22 & 90mg 9/23  Upon examination today, Mr Yun Zhang reports he is feeling well  Admits to no complaints since admission but has not been exerting himself  Denies CP, palpitations, SOB, BOLDEN, LE edema b/l, orthopnea, PND, numbness/tinlging/paresthesias in UE or LE bilaterally, HA, lightheadeness, dizziness, presyncopal symptoms, hx syncopal events, N/V/D, hemoptysis, hematemesis, hematochezia, melena  Denies hx stroke, hx cancer w/ chest wall radiation, hx blood loss anemia, hx varicose veins or vein stripping  PMH includes CAD, HTN, HLD, obesity, IDDM2, CM (EF 38%), vitamin D deficiency, obesity (BMI 32 22)  PSH includes cardiac cath, I&D LE wound (foot) right toe amp  Paternal h/o CVA  Denies FH of CAD/MI, HTN, HL, valvular disease, DM, aortic aneurysms, or SCD  Patient is retired  however works as a  now w/ no heavy lifting at his job  Lives in a home w/ wife  Does not use an assist device for ambulation  Drives   Has large property, does a lot of outdoor work/activitiies such as mowing lawn w/ riding mower, splitting wood w/ machine, riding ATVs  Has sons locally who can help w/ yard work  Denies current or prior use of tobacco, ETOH, or drug use  NKDA  Cardiac pertinent medications include: Aspirin 81mg, Lipitor 80mg, Lantus/Humalog, Losartan 100mg, Lopressor 25mg  Other medications can be reviewed in the patient chart  Patient sees Dr Karyna Iniguez [facility] as her primary care physician, their prior visit documentation was reviewed at this visit  Patient does not see a cardiologist     Not COVID vaccines on file  Inpatient consult to Cardiothoracic Surgery  Consult performed by: Diego Pacheco PA-C  Consult ordered by: Manuel Hwang MD          Review of Systems   Constitutional: Negative  Negative for activity change, diaphoresis, fatigue and unexpected weight change  HENT: Negative  Negative for dental problem  Respiratory: Negative  Negative for chest tightness, shortness of breath and wheezing  Cardiovascular: Negative  Negative for chest pain, palpitations and leg swelling  Gastrointestinal: Negative  Negative for blood in stool, constipation, diarrhea, nausea and vomiting  Genitourinary: Negative  Musculoskeletal: Negative  Negative for arthralgias, back pain, gait problem and myalgias  Skin: Negative  Neurological: Negative  Negative for dizziness, syncope, weakness, light-headedness, numbness and headaches  Hematological: Negative  Does not bruise/bleed easily  All other systems reviewed and are negative         Historical Information   Past Medical History:   Diagnosis Date   • Diabetes mellitus ([facility] [address] )    • Hypertension      Past Surgical History:   Procedure Laterality Date   • CARDIAC CATHETERIZATION  9/23/2021   • INCISION AND DRAINAGE OF WOUND Right 9/8/2019    Procedure: INCISION AND DRAINAGE (I&D) EXTREMITY;  Surgeon: Efrain Deutsch DPM;  Location: [facility];  Service: Podiatry     Social History     Substance and Sexual Activity   Alcohol Use Yes   • Alcohol/week: 30 0 standard drinks   • Types: 30 Cans of beer per week    Comment: 5-6 beers 4-5 x per week      Social History     Substance and Sexual Activity   Drug Use Never     Social History     Tobacco Use   Smoking Status Never Smoker   Smokeless Tobacco Never Used     Family History: as above    Meds/Allergies   all current active meds have been reviewed, current meds:   Current Facility-Administered Medications   Medication Dose Route Frequency   • acetaminophen (TYLENOL) tablet 650 mg  650 mg Oral Q6H PRN   • aspirin (ECOTRIN LOW STRENGTH) EC tablet 81 mg  81 mg Oral Daily   • atorvastatin (LIPITOR) tablet 80 mg  80 mg Oral QPM   • heparin (porcine) 25,000 units in 0 45% NaCl 250 mL infusion (premix)  3-20 Units/kg/hr (Order-Specific) Intravenous Titrated   • heparin (porcine) injection 2,000 Units  2,000 Units Intravenous Q1H PRN   • heparin (porcine) injection 4,000 Units  4,000 Units Intravenous Q1H PRN   • insulin glargine (LANTUS) subcutaneous injection 22 Units 0 22 mL  22 Units Subcutaneous HS   • insulin lispro (HumaLOG) 100 units/mL subcutaneous injection 1-6 Units  1-6 Units Subcutaneous 4x Daily (AC & HS)   • losartan (COZAAR) tablet 100 mg  100 mg Oral Daily   • metoprolol tartrate (LOPRESSOR) tablet 25 mg  25 mg Oral Q12H SOLEDAD   • nitroglycerin (NITROSTAT) SL tablet 0 4 mg  0 4 mg Sublingual Q5 Min PRN   • ondansetron (ZOFRAN) injection 4 mg  4 mg Intravenous Q6H PRN   • oxyCODONE (ROXICODONE) IR tablet 5 mg  5 mg Oral Q4H PRN    and PTA meds:   Prior to Admission Medications   Prescriptions Last Dose Informant Patient Reported? Taking?    LANTUS SOLOSTAR 100 units/mL injection pen  Self Yes No   Si Units daily at bedtime    cholecalciferol (VITAMIN D3) 1,000 units tablet   Yes No   Sig: Take 5,000 Units by mouth daily   losartan-hydrochlorothiazide (HYZAAR) 100-25 MG per tablet   Yes No   Sig: Take 1 tablet by mouth daily    metFORMIN (GLUCOPHAGE) 1000 MG tablet   Yes No   Sig: Take 1,000 mg by mouth 2 (two) times a day with "meals   rosuvastatin (CRESTOR) 5 mg tablet  Self Yes No   Sig: Take 5 mg by mouth MWF three times a week      Facility-Administered Medications: None     No Known Allergies    Objective   Vitals: Blood pressure 146/90, pulse 62, temperature 97 5 °F (36 4 °C), resp  rate 15, height 5' 7\" (1 702 m), weight 93 3 kg (205 lb 11 oz), SpO2 90 %  Invasive Devices     Peripheral Intravenous Line            Peripheral IV 09/22/21 Right Antecubital 1 day                Physical Exam  Constitutional:       General: He is not in acute distress  Appearance: Normal appearance  He is well-developed  He is obese  He is not ill-appearing or toxic-appearing  Comments: Laying in bed in NAD   HENT:      Head: Normocephalic and atraumatic  Mouth/Throat:      Dentition: Normal dentition  Does not have dentures  Pharynx: Uvula midline  Neck:      Vascular: No carotid bruit or JVD  Trachea: Trachea normal    Cardiovascular:      Rate and Rhythm: Normal rate and regular rhythm  Comments: No heaves/lifts on palpation  Pulmonary:      Effort: Pulmonary effort is normal  No accessory muscle usage or respiratory distress  Abdominal:      General: Bowel sounds are normal  There is no distension  Palpations: Abdomen is not rigid  There is no mass  Tenderness: There is no abdominal tenderness  There is no guarding or rebound  Musculoskeletal:      Cervical back: Normal range of motion and neck supple  Skin:     General: Skin is warm and dry  Findings: No bruising, petechiae or rash  Nails: There is no clubbing  Comments: Trace edema b/l LE, no varicosities appreciated to b/l LE   Neurological:      Mental Status: He is alert and oriented to person, place, and time  Cranial Nerves: No cranial nerve deficit  Sensory: No sensory deficit        Comments: No focal deficit appreciated         Lab Results:   Results from last 7 days   Lab Units 09/24/21  0303 09/23/21  0219 " 09/22/21  1716   WBC Thousand/uL 7 41 7 03 7 47   HEMOGLOBIN g/dL 11 9* 11 9* 13 1   HEMATOCRIT % 36 5 35 9* 39 4   PLATELETS Thousands/uL 262 250 287     Results from last 7 days   Lab Units 09/24/21  0303 09/23/21  0219 09/22/21  1716   POTASSIUM mmol/L 3 9 3 6 4 3   CHLORIDE mmol/L 108 107 105   CO2 mmol/L 30 25 25   BUN mg/dL 19 21 20   CREATININE mg/dL 0 99 1 09 1 00   CALCIUM mg/dL 8 6 8 8 9 0     Results from last 7 days   Lab Units 09/24/21  0258 09/23/21  0219 09/22/21 2012   PTT seconds 54* 46* 31     Lab Results   Component Value Date    HGBA1C 6 9 (H) 07/20/2021     Lab Results   Component Value Date    CKTOTAL 117 09/07/2019    TROPONINI 1 87 (H) 09/23/2021       Imaging Studies: I have personally reviewed pertinent reports  and I have personally reviewed pertinent films in PACS  EKG, Pathology, and Other Studies: I have personally reviewed pertinent reports  and I have personally reviewed pertinent films in PACS    Cardiac Cath 9/23: LM- 90 distal; LAD- 90 ostial, D1 90  TTE 9/23: EF 38%, borderline dilated LV w/ mildly increase thickness & mod reduced function, LA mildly dilated, AV sclerosis w/o AS or AI, mild MAC, mild MR, trace to mild TR, mild pulm HTN w/ PAP 41, trace pericardial effusion     CXR 9/22: no acute disease     EKG 9/22: done     Assessment:  Patient Active Problem List    Diagnosis Date Noted   • ACS (acute coronary syndrome) ([facility] ) 09/22/2021   • Hyperlipidemia 09/22/2021   • Essential hypertension 09/09/2019   • Anemia 09/09/2019   • Type 2 diabetes mellitus with diabetic polyneuropathy, without long-term current use of insulin ([facility] ) 09/07/2019       Plan:    Risks and benefits of coronary artery bypass grafting were discussed in detail today with the patient  They understand and wish to proceed with further workup and ultimately surgical intervention  We have ordered routine preoperative laboratory and vascular studies    Pending the results of these tests, they will be scheduled for surgery with LORI Salguero       The patient was comfortable with our recommendations, and their questions were answered to their satisfaction  We will continue to evaluate the patient daily with further recommendations as work up is completed  Thank you for allowing us to participate in the care of this patient         Lauryn Burden PA-C  7:20 AM  09/24/21

## 2021-09-24 NOTE — H&P (VIEW-ONLY)
Consultation - Cardiothoracic Surgery   Varney Dubin 62 y o  male MRN: [id number]  Unit/Bed#: [place] 404-01 Encounter: [id number]      History of Present Illness   Physician Requesting Consult: Violeta Strickland DO  Reason for Consult / Principal Problem: MV CAD  HPI: Varney Dubin is a 62y o  year old male who presents with presents for evaluation of MV CAD  Patient states he has not been feeling well for past 3 months  C/o increasing fatiguability, frequent SOB & overall malaise  Over past month feels like he has had a chest cold w/ frequent phlegm production  Called PCP who advised trip to ED  Presented to [facility] ED 9/22 w/ these complaints  EKG w/ non-specific ST changes, (+) troponins  Dx NSTEMI & admitted for w/u  TTE w/ decreased EF & no valvular dysfunction  Cath w/ MV CAD  Transferred to [facility] for cardiac sx consultation  (+) Brilinta 180mg 9/22 & 90mg 9/23  Upon examination today, Mr Dorita Verma reports he is feeling well  Admits to no complaints since admission but has not been exerting himself  Denies CP, palpitations, SOB, BOLDEN, LE edema b/l, orthopnea, PND, numbness/tinlging/paresthesias in UE or LE bilaterally, HA, lightheadeness, dizziness, presyncopal symptoms, hx syncopal events, N/V/D, hemoptysis, hematemesis, hematochezia, melena  Denies hx stroke, hx cancer w/ chest wall radiation, hx blood loss anemia, hx varicose veins or vein stripping  PMH includes CAD, HTN, HLD, obesity, IDDM2, CM (EF 38%), vitamin D deficiency, obesity (BMI 32 22)  PSH includes cardiac cath, I&D LE wound (foot) right toe amp  Paternal h/o CVA  Denies FH of CAD/MI, HTN, HL, valvular disease, DM, aortic aneurysms, or SCD  Patient is retired  however works as a  now w/ no heavy lifting at his job  Lives in a home w/ wife  Does not use an assist device for ambulation  Drives   Has large property, does a lot of outdoor work/activitiies such as mowing lawn w/ riding mower, splitting wood w/ machine, riding ATVs  Has sons locally who can help w/ yard work  Denies current or prior use of tobacco, ETOH, or drug use  NKDA  Cardiac pertinent medications include: Aspirin 81mg, Lipitor 80mg, Lantus/Humalog, Losartan 100mg, Lopressor 25mg  Other medications can be reviewed in the patient chart  Patient sees Dr Alonso Million [facility]) as her primary care physician, their prior visit documentation was reviewed at this visit  Patient does not see a cardiologist     Not COVID vaccines on file  Inpatient consult to Cardiothoracic Surgery  Consult performed by: Yvette Villegas PA-C  Consult ordered by: Ronal Goldmann, MD          Review of Systems   Constitutional: Negative  Negative for activity change, diaphoresis, fatigue and unexpected weight change  HENT: Negative  Negative for dental problem  Respiratory: Negative  Negative for chest tightness, shortness of breath and wheezing  Cardiovascular: Negative  Negative for chest pain, palpitations and leg swelling  Gastrointestinal: Negative  Negative for blood in stool, constipation, diarrhea, nausea and vomiting  Genitourinary: Negative  Musculoskeletal: Negative  Negative for arthralgias, back pain, gait problem and myalgias  Skin: Negative  Neurological: Negative  Negative for dizziness, syncope, weakness, light-headedness, numbness and headaches  Hematological: Negative  Does not bruise/bleed easily  All other systems reviewed and are negative         Historical Information   Past Medical History:   Diagnosis Date   • Diabetes mellitus ([facility] [address] )    • Hypertension      Past Surgical History:   Procedure Laterality Date   • CARDIAC CATHETERIZATION  9/23/2021   • INCISION AND DRAINAGE OF WOUND Right 9/8/2019    Procedure: INCISION AND DRAINAGE (I&D) EXTREMITY;  Surgeon: Kwadwo Gabriel DPM;  Location: [facility];  Service: Podiatry     Social History     Substance and Sexual Activity   Alcohol Use Yes   • Alcohol/week: 30 0 standard drinks   • Types: 30 Cans of beer per week    Comment: 5-6 beers 4-5 x per week      Social History     Substance and Sexual Activity   Drug Use Never     Social History     Tobacco Use   Smoking Status Never Smoker   Smokeless Tobacco Never Used     Family History: as above    Meds/Allergies   all current active meds have been reviewed, current meds:   Current Facility-Administered Medications   Medication Dose Route Frequency   • acetaminophen (TYLENOL) tablet 650 mg  650 mg Oral Q6H PRN   • aspirin (ECOTRIN LOW STRENGTH) EC tablet 81 mg  81 mg Oral Daily   • atorvastatin (LIPITOR) tablet 80 mg  80 mg Oral QPM   • heparin (porcine) 25,000 units in 0 45% NaCl 250 mL infusion (premix)  3-20 Units/kg/hr (Order-Specific) Intravenous Titrated   • heparin (porcine) injection 2,000 Units  2,000 Units Intravenous Q1H PRN   • heparin (porcine) injection 4,000 Units  4,000 Units Intravenous Q1H PRN   • insulin glargine (LANTUS) subcutaneous injection 22 Units 0 22 mL  22 Units Subcutaneous HS   • insulin lispro (HumaLOG) 100 units/mL subcutaneous injection 1-6 Units  1-6 Units Subcutaneous 4x Daily (AC & HS)   • losartan (COZAAR) tablet 100 mg  100 mg Oral Daily   • metoprolol tartrate (LOPRESSOR) tablet 25 mg  25 mg Oral Q12H SOLEDAD   • nitroglycerin (NITROSTAT) SL tablet 0 4 mg  0 4 mg Sublingual Q5 Min PRN   • ondansetron (ZOFRAN) injection 4 mg  4 mg Intravenous Q6H PRN   • oxyCODONE (ROXICODONE) IR tablet 5 mg  5 mg Oral Q4H PRN    and PTA meds:   Prior to Admission Medications   Prescriptions Last Dose Informant Patient Reported? Taking?    LANTUS SOLOSTAR 100 units/mL injection pen  Self Yes No   Si Units daily at bedtime    cholecalciferol (VITAMIN D3) 1,000 units tablet   Yes No   Sig: Take 5,000 Units by mouth daily   losartan-hydrochlorothiazide (HYZAAR) 100-25 MG per tablet   Yes No   Sig: Take 1 tablet by mouth daily    metFORMIN (GLUCOPHAGE) 1000 MG tablet   Yes No   Sig: Take 1,000 mg by mouth 2 (two) times a day with "meals   rosuvastatin (CRESTOR) 5 mg tablet  Self Yes No   Sig: Take 5 mg by mouth MWF three times a week      Facility-Administered Medications: None     No Known Allergies    Objective   Vitals: Blood pressure 146/90, pulse 62, temperature 97 5 °F (36 4 °C), resp  rate 15, height 5' 7\" (1 702 m), weight 93 3 kg (205 lb 11 oz), SpO2 90 %  Invasive Devices     Peripheral Intravenous Line            Peripheral IV 09/22/21 Right Antecubital 1 day                Physical Exam  Constitutional:       General: He is not in acute distress  Appearance: Normal appearance  He is well-developed  He is obese  He is not ill-appearing or toxic-appearing  Comments: Laying in bed in NAD   HENT:      Head: Normocephalic and atraumatic  Mouth/Throat:      Dentition: Normal dentition  Does not have dentures  Pharynx: Uvula midline  Neck:      Vascular: No carotid bruit or JVD  Trachea: Trachea normal    Cardiovascular:      Rate and Rhythm: Normal rate and regular rhythm  Comments: No heaves/lifts on palpation  Pulmonary:      Effort: Pulmonary effort is normal  No accessory muscle usage or respiratory distress  Abdominal:      General: Bowel sounds are normal  There is no distension  Palpations: Abdomen is not rigid  There is no mass  Tenderness: There is no abdominal tenderness  There is no guarding or rebound  Musculoskeletal:      Cervical back: Normal range of motion and neck supple  Skin:     General: Skin is warm and dry  Findings: No bruising, petechiae or rash  Nails: There is no clubbing  Comments: Trace edema b/l LE, no varicosities appreciated to b/l LE   Neurological:      Mental Status: He is alert and oriented to person, place, and time  Cranial Nerves: No cranial nerve deficit  Sensory: No sensory deficit        Comments: No focal deficit appreciated         Lab Results:   Results from last 7 days   Lab Units 09/24/21  0303 09/23/21  0219 " 09/22/21  1716   WBC Thousand/uL 7 41 7 03 7 47   HEMOGLOBIN g/dL 11 9* 11 9* 13 1   HEMATOCRIT % 36 5 35 9* 39 4   PLATELETS Thousands/uL 262 250 287     Results from last 7 days   Lab Units 09/24/21  0303 09/23/21  0219 09/22/21  1716   POTASSIUM mmol/L 3 9 3 6 4 3   CHLORIDE mmol/L 108 107 105   CO2 mmol/L 30 25 25   BUN mg/dL 19 21 20   CREATININE mg/dL 0 99 1 09 1 00   CALCIUM mg/dL 8 6 8 8 9 0     Results from last 7 days   Lab Units 09/24/21  0258 09/23/21  0219 09/22/21 2012   PTT seconds 54* 46* 31     Lab Results   Component Value Date    HGBA1C 6 9 (H) 07/20/2021     Lab Results   Component Value Date    CKTOTAL 117 09/07/2019    TROPONINI 1 87 (H) 09/23/2021       Imaging Studies: I have personally reviewed pertinent reports  and I have personally reviewed pertinent films in PACS  EKG, Pathology, and Other Studies: I have personally reviewed pertinent reports  and I have personally reviewed pertinent films in PACS    Cardiac Cath 9/23: LM- 90 distal; LAD- 90 ostial, D1 90  TTE 9/23: EF 38%, borderline dilated LV w/ mildly increase thickness & mod reduced function, LA mildly dilated, AV sclerosis w/o AS or AI, mild MAC, mild MR, trace to mild TR, mild pulm HTN w/ PAP 41, trace pericardial effusion     CXR 9/22: no acute disease     EKG 9/22: done     Assessment:  Patient Active Problem List    Diagnosis Date Noted   • ACS (acute coronary syndrome) ([facility] ) 09/22/2021   • Hyperlipidemia 09/22/2021   • Essential hypertension 09/09/2019   • Anemia 09/09/2019   • Type 2 diabetes mellitus with diabetic polyneuropathy, without long-term current use of insulin ([facility] ) 09/07/2019       Plan:    Risks and benefits of coronary artery bypass grafting were discussed in detail today with the patient  They understand and wish to proceed with further workup and ultimately surgical intervention  We have ordered routine preoperative laboratory and vascular studies    Pending the results of these tests, they will be scheduled for surgery with LORI Abdullahi       The patient was comfortable with our recommendations, and their questions were answered to their satisfaction  We will continue to evaluate the patient daily with further recommendations as work up is completed  Thank you for allowing us to participate in the care of this patient         Anitha Kohli PA-C  7:20 AM  09/24/21

## 2021-09-24 NOTE — ASSESSMENT & PLAN NOTE
Acute coronary syndrome  Presented with dyspnea on exertion with elevated troponins concerning for NSTEMI  Patient underwent cardiac catheterization revealing severe left main disease, 90% ostial LAD, 90% ostial circumflex, 90% diagonal stenosis, 2 obtuse marginal vessels, left ventricular ejection fraction of 37%  Discuss with Cardiology patient needs [facility] [place] surgery evaluation  Plan to admit to med surge  Telemetry monitoring  Resume heparin drip for hours after TR band is completely removed    Continue with aspirin, losartan, metoprolol, statin  Patient has anxiety about surgery, would like wife and family present before making decision   CT surgery consulted    9/24/21: plan is for cabg early next week likely Tuesday, preoperative evaluation pending  On my exam patient has no complaints of chest pain or shortness of breath, he has ambulated to the bathroom and back without any exertional symptoms    -continue heparin drip  -hold brillinta  -continue aspirin, metoprolol and statin

## 2021-09-24 NOTE — PLAN OF CARE
Problem: CARDIOVASCULAR - ADULT  Goal: Maintains optimal cardiac output and hemodynamic stability  Description: INTERVENTIONS:  - Monitor I/O, vital signs and rhythm  - Monitor for S/S and trends of decreased cardiac output  - Administer and titrate ordered vasoactive medications to optimize hemodynamic stability  - Assess quality of pulses, skin color and temperature  - Assess for signs of decreased coronary artery perfusion  - Instruct patient to report change in severity of symptoms  9/23/2021 2256 by Kate Gonsales RN  Outcome: Progressing  9/23/2021 2256 by Kate Gonsales RN  Outcome: Progressing  Goal: Absence of cardiac dysrhythmias or at baseline rhythm  Description: INTERVENTIONS:  - Continuous cardiac monitoring, vital signs, obtain 12 lead EKG if ordered  - Administer antiarrhythmic and heart rate control medications as ordered  - Monitor electrolytes and administer replacement therapy as ordered  9/23/2021 2256 by Kate Gonsales RN  Outcome: Progressing  9/23/2021 2256 by Kate Gonsales RN  Outcome: Progressing     Problem: RESPIRATORY - ADULT  Goal: Achieves optimal ventilation and oxygenation  Description: INTERVENTIONS:  - Assess for changes in respiratory status  - Assess for changes in mentation and behavior  - Position to facilitate oxygenation and minimize respiratory effort  - Oxygen administered by appropriate delivery if ordered  - Initiate smoking cessation education as indicated  - Encourage broncho-pulmonary hygiene including cough, deep breathe, Incentive Spirometry  - Assess the need for suctioning and aspirate as needed  - Assess and instruct to report SOB or any respiratory difficulty  - Respiratory Therapy support as indicated  9/23/2021 2256 by Kate Gonsales RN  Outcome: Progressing  9/23/2021 2256 by Kate Gonsales RN  Outcome: Progressing     Problem: PAIN - ADULT  Goal: Verbalizes/displays adequate comfort level or baseline comfort level  Description: Interventions:  - Encourage patient to monitor pain and request assistance  - Assess pain using appropriate pain scale  - Administer analgesics based on type and severity of pain and evaluate response  - Implement non-pharmacological measures as appropriate and evaluate response  - Consider cultural and social influences on pain and pain management  - Notify physician/advanced practitioner if interventions unsuccessful or patient reports new pain  9/23/2021 2256 by Hilda Morales RN  Outcome: Progressing  9/23/2021 2256 by Hilda Morales RN  Outcome: Progressing     Problem: INFECTION - ADULT  Goal: Absence or prevention of progression during hospitalization  Description: INTERVENTIONS:  - Assess and monitor for signs and symptoms of infection  - Monitor lab/diagnostic results  - Monitor all insertion sites, i e  indwelling lines, tubes, and drains  - Monitor endotracheal if appropriate and nasal secretions for changes in amount and color  - [place] appropriate cooling/warming therapies per order  - Administer medications as ordered  - Instruct and encourage patient and family to use good hand hygiene technique  - Identify and instruct in appropriate isolation precautions for identified infection/condition  9/23/2021 2256 by Hilda Morales RN  Outcome: Progressing  9/23/2021 2256 by Hilda Morales RN  Outcome: Progressing  Goal: Absence of fever/infection during neutropenic period  Description: INTERVENTIONS:  - Monitor WBC    9/23/2021 2256 by Hilda Morales RN  Outcome: Progressing  9/23/2021 2256 by Hilda Morales RN  Outcome: Progressing     Problem: SAFETY ADULT  Goal: Patient will remain free of falls  Description: INTERVENTIONS:  - Educate patient/family on patient safety including physical limitations  - Instruct patient to call for assistance with activity   - Consult OT/PT to assist with strengthening/mobility   - Keep Call bell within reach  - Keep bed low and locked with side rails adjusted as appropriate  - Keep care items and personal belongings within reach  - Initiate and maintain comfort rounds    9/23/2021 2256 by Barbara Monteiro RN  Outcome: Progressing  9/23/2021 2256 by Barbara Monteiro RN  Outcome: Progressing  Goal: Maintain or return to baseline ADL function  Description: INTERVENTIONS:  -  Assess patient's ability to carry out ADLs; assess patient's baseline for ADL function and identify physical deficits which impact ability to perform ADLs (bathing, care of mouth/teeth, toileting, grooming, dressing, etc )  - Assess/evaluate cause of self-care deficits   - Assess range of motion  - Assess patient's mobility; develop plan if impaired  - Assess patient's need for assistive devices and provide as appropriate  - Encourage maximum independence but intervene and supervise when necessary  - Involve family in performance of ADLs  - Assess for home care needs following discharge   - Consider OT consult to assist with ADL evaluation and planning for discharge  - Provide patient education as appropriate  9/23/2021 2256 by Barbara Monteiro RN  Outcome: Progressing  9/23/2021 2256 by Barbara Monteiro RN  Outcome: Progressing  Goal: Maintains/Returns to pre admission functional level  Description: INTERVENTIONS:  - Perform BMAT or MOVE assessment daily    - Set and communicate daily mobility goal to care team and patient/family/caregiver     - Collaborate with rehabilitation services on mobility goals if consulted  - Record patient progress and toleration of activity level   9/23/2021 2256 by Barbara Monteiro RN  Outcome: Progressing  9/23/2021 2256 by Barbara Monteiro RN  Outcome: Progressing     Problem: DISCHARGE PLANNING  Goal: Discharge to home or other facility with appropriate resources  Description: INTERVENTIONS:  - Identify barriers to discharge w/patient and caregiver  - Arrange for needed discharge resources and transportation as appropriate  - Identify discharge learning needs (meds, wound care, etc )  - Arrange for interpretive services to assist at discharge as needed  - Refer to Case Management Department for coordinating discharge planning if the patient needs post-hospital services based on physician/advanced practitioner order or complex needs related to functional status, cognitive ability, or social support system  9/23/2021 2256 by Richard Springer RN  Outcome: Progressing  9/23/2021 2256 by Richard Springer RN  Outcome: Progressing     Problem: Knowledge Deficit  Goal: Patient/family/caregiver demonstrates understanding of disease process, treatment plan, medications, and discharge instructions  Description: Complete learning assessment and assess knowledge base    Interventions:  - Provide teaching at level of understanding  - Provide teaching via preferred learning methods  9/23/2021 2256 by Richard Springer RN  Outcome: Progressing  9/23/2021 2256 by Richard Springer RN  Outcome: Progressing

## 2021-09-25 LAB
ANION GAP SERPL CALCULATED.3IONS-SCNC: 2 MMOL/L (ref 4–13)
APTT PPP: 82 SECONDS (ref 23–37)
BUN SERPL-MCNC: 19 MG/DL (ref 5–25)
CALCIUM SERPL-MCNC: 8.7 MG/DL (ref 8.3–10.1)
CHLORIDE SERPL-SCNC: 109 MMOL/L (ref 100–108)
CO2 SERPL-SCNC: 30 MMOL/L (ref 21–32)
CREAT SERPL-MCNC: 0.97 MG/DL (ref 0.6–1.3)
EST. AVERAGE GLUCOSE BLD GHB EST-MCNC: 134 MG/DL
GFR SERPL CREATININE-BSD FRML MDRD: 84 ML/MIN/1.73SQ M
GLUCOSE SERPL-MCNC: 107 MG/DL (ref 65–140)
GLUCOSE SERPL-MCNC: 113 MG/DL (ref 65–140)
GLUCOSE SERPL-MCNC: 119 MG/DL (ref 65–140)
GLUCOSE SERPL-MCNC: 156 MG/DL (ref 65–140)
GLUCOSE SERPL-MCNC: 167 MG/DL (ref 65–140)
HBA1C MFR BLD: 6.3 %
INR PPP: 1.11 (ref 0.84–1.19)
MRSA NOSE QL CULT: NORMAL
POTASSIUM SERPL-SCNC: 4 MMOL/L (ref 3.5–5.3)
PROTHROMBIN TIME: 13.9 SECONDS (ref 11.6–14.5)
SODIUM SERPL-SCNC: 141 MMOL/L (ref 136–145)

## 2021-09-25 PROCEDURE — 99232 SBSQ HOSP IP/OBS MODERATE 35: CPT | Performed by: INTERNAL MEDICINE

## 2021-09-25 PROCEDURE — 80048 BASIC METABOLIC PNL TOTAL CA: CPT | Performed by: INTERNAL MEDICINE

## 2021-09-25 PROCEDURE — 85610 PROTHROMBIN TIME: CPT | Performed by: INTERNAL MEDICINE

## 2021-09-25 PROCEDURE — 82948 REAGENT STRIP/BLOOD GLUCOSE: CPT

## 2021-09-25 PROCEDURE — 85730 THROMBOPLASTIN TIME PARTIAL: CPT | Performed by: INTERNAL MEDICINE

## 2021-09-25 PROCEDURE — 83036 HEMOGLOBIN GLYCOSYLATED A1C: CPT | Performed by: INTERNAL MEDICINE

## 2021-09-25 RX ADMIN — MUPIROCIN 1 APPLICATION.: 20 OINTMENT TOPICAL at 21:49

## 2021-09-25 RX ADMIN — INSULIN LISPRO 1 UNITS: 100 INJECTION, SOLUTION INTRAVENOUS; SUBCUTANEOUS at 21:49

## 2021-09-25 RX ADMIN — CHLORHEXIDINE GLUCONATE 15 ML: 1.2 SOLUTION ORAL at 09:25

## 2021-09-25 RX ADMIN — INSULIN GLARGINE 18 UNITS: 100 INJECTION, SOLUTION SUBCUTANEOUS at 21:49

## 2021-09-25 RX ADMIN — ATORVASTATIN CALCIUM 80 MG: 80 TABLET, FILM COATED ORAL at 17:33

## 2021-09-25 RX ADMIN — ASPIRIN 81 MG: 81 TABLET, COATED ORAL at 09:24

## 2021-09-25 RX ADMIN — HEPARIN SODIUM 17.1 UNITS/KG/HR: 10000 INJECTION, SOLUTION INTRAVENOUS at 04:41

## 2021-09-25 RX ADMIN — HEPARIN SODIUM 17.1 UNITS/KG/HR: 10000 INJECTION, SOLUTION INTRAVENOUS at 21:47

## 2021-09-25 RX ADMIN — METOPROLOL TARTRATE 25 MG: 25 TABLET, FILM COATED ORAL at 21:49

## 2021-09-25 RX ADMIN — CHLORHEXIDINE GLUCONATE 15 ML: 1.2 SOLUTION ORAL at 21:49

## 2021-09-25 RX ADMIN — MUPIROCIN 1 APPLICATION.: 20 OINTMENT TOPICAL at 09:25

## 2021-09-25 RX ADMIN — METOPROLOL TARTRATE 25 MG: 25 TABLET, FILM COATED ORAL at 09:25

## 2021-09-25 NOTE — ASSESSMENT & PLAN NOTE
Lab Results   Component Value Date    HGBA1C 6 3 (H) 09/25/2021       Recent Labs     09/24/21  2106 09/25/21  0622 09/25/21  1043 09/25/21  1601   POCGLU 100 107 167* 113       Blood Sugar Average: Last 72 hrs:  (P) 125 currently on 20 units    Restart his home dose of 36 units of glargine at discharge  Sliding scale  Avoid hypoglycemia

## 2021-09-25 NOTE — ASSESSMENT & PLAN NOTE
Acute coronary syndrome  Presented with dyspnea on exertion with elevated troponins concerning for NSTEMI  Patient underwent cardiac catheterization revealing severe left main disease, 90% ostial LAD, 90% ostial circumflex, 90% diagonal stenosis, 2 obtuse marginal vessels, left ventricular ejection fraction of 37%  Discuss with Cardiology patient needs [facility] for CT surgery evaluation  Plan to admit to med surge  Telemetry monitoring  Resume heparin drip for hours after TR band is completely removed  Continue with aspirin, losartan, metoprolol, statin  Patient has anxiety about surgery, would like wife and family present before making decision   CT surgery consulted    9/25/21: plan is for cabg early next week likely Tuesday, preoperative evaluation pending  On my exam patient has no complaints of chest pain or shortness of breath, he used to be ambulatory without any difficulties, denying any episodes of chest pain or shortness of breath  His only complaint today is that his fingers feel cold    He denies fever, chills, nausea, abdominal pain     -continue heparin drip  -hold brillinta  -continue aspirin, metoprolol and statin

## 2021-09-25 NOTE — PROGRESS NOTES
[facility]  Progress Note - Shahram Levels 1960, 62 y o  male MRN: [id number]  Unit/Bed#: [facility] 404-01 Encounter: [id number]  Primary Care Provider: Simran Glynn DO   Date and time admitted to hospital: 9/23/2021  5:17 PM    * ACS (acute coronary syndrome) [facility])  Assessment & Plan  Acute coronary syndrome  Presented with dyspnea on exertion with elevated troponins concerning for NSTEMI  Patient underwent cardiac catheterization revealing severe left main disease, 90% ostial LAD, 90% ostial circumflex, 90% diagonal stenosis, 2 obtuse marginal vessels, left ventricular ejection fraction of 37%  Discuss with Cardiology patient needs [facility] for CT surgery evaluation  Plan to admit to [facility]  Telemetry monitoring  Resume heparin drip for hours after TR band is completely removed  Continue with aspirin, losartan, metoprolol, statin  Patient has anxiety about surgery, would like wife and family present before making decision   CT surgery consulted    9/25/21: plan is for cabg early next week likely Tuesday, preoperative evaluation pending  On my exam patient has no complaints of chest pain or shortness of breath, he used to be ambulatory without any difficulties, denying any episodes of chest pain or shortness of breath  His only complaint today is that his fingers feel cold    He denies fever, chills, nausea, abdominal pain     -continue heparin drip  -hold brillinta  -continue aspirin, metoprolol and statin    Hyperlipidemia  Assessment & Plan  Continue with high-dose statin    Anemia  Assessment & Plan  Patient has anemia, now 11 9 today, stable from yesterday  Continue to monitor closely     Essential hypertension  Assessment & Plan  Continue with losartan  Continue to hold hctz      Type 2 diabetes mellitus with diabetic polyneuropathy, without long-term current use of insulin [facility])  Assessment & Plan  Lab Results   Component Value Date    HGBA1C 6 3 (H) 2021       Recent Labs     21  2106 21  0622 21  1043 21  1601   POCGLU 100 107 167* 113       Blood Sugar Average: Last 72 hrs:  (P) 125 currently on 20 units  Restart his home dose of 36 units of glargine at discharge  Sliding scale  Avoid hypoglycemia            VTE Pharmacologic Prophylaxis: VTE Score: 5 High Risk (Score >/= 5) - Pharmacological DVT Prophylaxis Ordered: heparin drip  Sequential Compression Devices Ordered  Patient Centered Rounds: I performed bedside rounds with nursing staff today  Discussions with Specialists or Other Care Team Provider: n/a    Education and Discussions with Family / Patient: Updated  (wife) at bedside  Time Spent for Care: 30 minutes  More than 50% of total time spent on counseling and coordination of care as described above  Current Length of Stay: 2 day(s)  Current Patient Status: Inpatient   Certification Statement: The patient will continue to require additional inpatient hospital stay due to cabg eval  Discharge Plan: Anticipate discharge in >72 hrs to discharge location to be determined pending rehab evaluations  Code Status: Level 1 - Full Code    Subjective:   Patient denies chest pain or shortness of breath  ambualtory around his room without any symptoms  His only complaint is cold hands  He also has significant anxiety over the procedure  Objective:     Vitals:   Temp (24hrs), Av 9 °F (36 6 °C), Min:97 6 °F (36 4 °C), Max:98 1 °F (36 7 °C)    Temp:  [97 6 °F (36 4 °C)-98 1 °F (36 7 °C)] 98 °F (36 7 °C)  HR:  [56-66] 56  Resp:  [15] 15  BP: (126-153)/(76-88) 137/76  SpO2:  [96 %-99 %] 97 %  Body mass index is 32 22 kg/m²  Input and Output Summary (last 24 hours):      Intake/Output Summary (Last 24 hours) at 2021  Last data filed at 2021  Gross per 24 hour   Intake 485 18 ml   Output 1200 ml   Net -714 82 ml       Physical Exam:   Physical Exam  Vitals and nursing note reviewed  Constitutional:       General: He is not in acute distress  Appearance: He is well-developed  He is not ill-appearing, toxic-appearing or diaphoretic  HENT:      Head: Normocephalic and atraumatic  Eyes:      General: No scleral icterus  Conjunctiva/sclera: Conjunctivae normal    Cardiovascular:      Rate and Rhythm: Normal rate and regular rhythm  Heart sounds: No murmur heard  No friction rub  No gallop  Pulmonary:      Effort: Pulmonary effort is normal  No respiratory distress  Breath sounds: Normal breath sounds  No stridor  No wheezing, rhonchi or rales  Chest:      Chest wall: No tenderness  Abdominal:      General: There is no distension  Palpations: Abdomen is soft  There is no mass  Tenderness: There is no abdominal tenderness  There is no right CVA tenderness, left CVA tenderness, guarding or rebound  Hernia: No hernia is present  Musculoskeletal:         General: No swelling, tenderness, deformity or signs of injury  Cervical back: Neck supple  Right lower leg: No edema  Left lower leg: No edema  Skin:     General: Skin is warm and dry  Coloration: Skin is not jaundiced or pale  Findings: No bruising, erythema, lesion or rash  Neurological:      Mental Status: He is alert and oriented to person, place, and time            Additional Data:     Labs:  Results from last 7 days   Lab Units 09/24/21  0303 09/22/21  1716   WBC Thousand/uL 7 41 7 47   HEMOGLOBIN g/dL 11 9* 13 1   HEMATOCRIT % 36 5 39 4   PLATELETS Thousands/uL 262 287   NEUTROS PCT %  --  66   LYMPHS PCT %  --  21   MONOS PCT %  --  10   EOS PCT %  --  2     Results from last 7 days   Lab Units 09/25/21  0103 09/23/21  0219   SODIUM mmol/L 141 143   POTASSIUM mmol/L 4 0 3 6   CHLORIDE mmol/L 109* 107   CO2 mmol/L 30 25   BUN mg/dL 19 21   CREATININE mg/dL 0 97 1 09   ANION GAP mmol/L 2* 11   CALCIUM mg/dL 8 7 8 8   ALBUMIN g/dL  --  3 5   TOTAL BILIRUBIN mg/dL  --  0 57   ALK PHOS U/L  --  74   ALT U/L  --  27   AST U/L  --  20   GLUCOSE RANDOM mg/dL 119 91     Results from last 7 days   Lab Units 09/25/21  0103   INR  1 11     Results from last 7 days   Lab Units 09/25/21  1601 09/25/21  1043 09/25/21  0622 09/24/21  2106 09/24/21  1603 09/24/21  1034 09/24/21  0545 09/23/21  2117 09/23/21  1104 09/23/21  0711 09/22/21  2348   POC GLUCOSE mg/dl 113 167* 107 100 109 158* 95 151* 103 98 99     Results from last 7 days   Lab Units 09/25/21  0103   HEMOGLOBIN A1C % 6 3*           Lines/Drains:  Invasive Devices     Peripheral Intravenous Line            Peripheral IV 09/24/21 Right Forearm 1 day                  Telemetry:  Telemetry Orders (From admission, onward)             48 Hour Telemetry Monitoring  Continuous x 48 hours     Question:  Reason for 48 Hour Telemetry  Answer:  Arrhythmias Requiring Medical Therapy (eg  SVT, Vtach/fib, Bradycardia, Uncontrolled A-fib)                 Telemetry Reviewed: Normal Sinus Rhythm  Indication for Continued Telemetry Use: Awaiting PCI/EP Study/CABG           Imaging: No pertinent imaging reviewed      Recent Cultures (last 7 days):         Last 24 Hours Medication List:   Current Facility-Administered Medications   Medication Dose Route Frequency Provider Last Rate   • acetaminophen  650 mg Oral Q6H PRN Claudia Lowry MD     • aspirin  81 mg Oral Daily Claudia Lowry MD     • atorvastatin  80 mg Oral QPM Claudia Lowry MD     • chlorhexidine  15 mL Mouth/Throat Q12H [facility] Ingris Santo PA-C     • heparin (porcine)  3-20 Units/kg/hr (Order-Specific) Intravenous Titrated Claudia Lowry MD 17 1 Units/kg/hr (09/25/21 0441)   • heparin (porcine)  2,000 Units Intravenous Q1H PRN Claudia Lowry MD     • heparin (porcine)  4,000 Units Intravenous Q1H PRN Claudia Lowry MD     • insulin glargine  18 Units Subcutaneous HS Lam Thomas DO     • insulin lispro  1-6 Units Subcutaneous 4x Daily (AC & HS) Richard RADER Jackie Mishra MD     • metoprolol tartrate  25 mg Oral Q12H [facility] Ingris Santo PA-C     • mupirocin  1 application Nasal Z53T [facility] Ingris Santo PA-C     • nitroglycerin  0 4 mg Sublingual Q5 Min PRN Ivana Andrade MD     • ondansetron  4 mg Intravenous Q6H PRN Ivana Andrade MD     • oxyCODONE  5 mg Oral Q4H PRN Ivana Andrade MD          Today, Patient Was Seen By: Evon La DO    **Please Note: This note may have been constructed using a voice recognition system  **

## 2021-09-26 LAB
ABO GROUP BLD: NORMAL
ABO GROUP BLD: NORMAL
ANION GAP SERPL CALCULATED.3IONS-SCNC: 1 MMOL/L (ref 4–13)
APTT PPP: 63 SECONDS (ref 23–37)
APTT PPP: 65 SECONDS (ref 23–37)
APTT PPP: 91 SECONDS (ref 23–37)
BLD GP AB SCN SERPL QL: NEGATIVE
BUN SERPL-MCNC: 19 MG/DL (ref 5–25)
CALCIUM SERPL-MCNC: 8.8 MG/DL (ref 8.3–10.1)
CHLORIDE SERPL-SCNC: 110 MMOL/L (ref 100–108)
CO2 SERPL-SCNC: 29 MMOL/L (ref 21–32)
CREAT SERPL-MCNC: 1 MG/DL (ref 0.6–1.3)
GFR SERPL CREATININE-BSD FRML MDRD: 81 ML/MIN/1.73SQ M
GLUCOSE SERPL-MCNC: 103 MG/DL (ref 65–140)
GLUCOSE SERPL-MCNC: 121 MG/DL (ref 65–140)
GLUCOSE SERPL-MCNC: 127 MG/DL (ref 65–140)
GLUCOSE SERPL-MCNC: 145 MG/DL (ref 65–140)
GLUCOSE SERPL-MCNC: 96 MG/DL (ref 65–140)
POTASSIUM SERPL-SCNC: 4.3 MMOL/L (ref 3.5–5.3)
RH BLD: POSITIVE
RH BLD: POSITIVE
SODIUM SERPL-SCNC: 140 MMOL/L (ref 136–145)
SPECIMEN EXPIRATION DATE: NORMAL

## 2021-09-26 PROCEDURE — 86850 RBC ANTIBODY SCREEN: CPT | Performed by: PHYSICIAN ASSISTANT

## 2021-09-26 PROCEDURE — 99231 SBSQ HOSP IP/OBS SF/LOW 25: CPT | Performed by: PHYSICIAN ASSISTANT

## 2021-09-26 PROCEDURE — 85730 THROMBOPLASTIN TIME PARTIAL: CPT | Performed by: INTERNAL MEDICINE

## 2021-09-26 PROCEDURE — 80048 BASIC METABOLIC PNL TOTAL CA: CPT | Performed by: INTERNAL MEDICINE

## 2021-09-26 PROCEDURE — 82948 REAGENT STRIP/BLOOD GLUCOSE: CPT

## 2021-09-26 PROCEDURE — 99232 SBSQ HOSP IP/OBS MODERATE 35: CPT | Performed by: INTERNAL MEDICINE

## 2021-09-26 PROCEDURE — 86900 BLOOD TYPING SEROLOGIC ABO: CPT | Performed by: PHYSICIAN ASSISTANT

## 2021-09-26 PROCEDURE — 86920 COMPATIBILITY TEST SPIN: CPT

## 2021-09-26 PROCEDURE — 86901 BLOOD TYPING SEROLOGIC RH(D): CPT | Performed by: PHYSICIAN ASSISTANT

## 2021-09-26 RX ORDER — HEPARIN SODIUM 1000 [USP'U]/ML
10000 INJECTION, SOLUTION INTRAVENOUS; SUBCUTANEOUS ONCE
Status: CANCELLED | OUTPATIENT
Start: 2021-09-27

## 2021-09-26 RX ORDER — HEPARIN SODIUM 1000 [USP'U]/ML
400 INJECTION, SOLUTION INTRAVENOUS; SUBCUTANEOUS ONCE
Status: CANCELLED | OUTPATIENT
Start: 2021-09-27

## 2021-09-26 RX ORDER — CHLORHEXIDINE GLUCONATE 0.12 MG/ML
15 RINSE ORAL EVERY 12 HOURS SCHEDULED
Status: DISCONTINUED | OUTPATIENT
Start: 2021-09-26 | End: 2021-09-27 | Stop reason: HOSPADM

## 2021-09-26 RX ADMIN — ASPIRIN 81 MG: 81 TABLET, COATED ORAL at 10:29

## 2021-09-26 RX ADMIN — METOPROLOL TARTRATE 25 MG: 25 TABLET, FILM COATED ORAL at 22:25

## 2021-09-26 RX ADMIN — METOPROLOL TARTRATE 25 MG: 25 TABLET, FILM COATED ORAL at 10:29

## 2021-09-26 RX ADMIN — MUPIROCIN 1 APPLICATION.: 20 OINTMENT TOPICAL at 22:21

## 2021-09-26 RX ADMIN — MUPIROCIN 1 APPLICATION.: 20 OINTMENT TOPICAL at 10:29

## 2021-09-26 RX ADMIN — CHLORHEXIDINE GLUCONATE 15 ML: 1.2 SOLUTION ORAL at 10:29

## 2021-09-26 RX ADMIN — ATORVASTATIN CALCIUM 80 MG: 80 TABLET, FILM COATED ORAL at 18:01

## 2021-09-26 RX ADMIN — INSULIN GLARGINE 18 UNITS: 100 INJECTION, SOLUTION SUBCUTANEOUS at 22:22

## 2021-09-26 RX ADMIN — HEPARIN SODIUM 15.1 UNITS/KG/HR: 10000 INJECTION, SOLUTION INTRAVENOUS at 14:09

## 2021-09-26 RX ADMIN — CHLORHEXIDINE GLUCONATE 15 ML: 1.2 SOLUTION ORAL at 22:21

## 2021-09-26 NOTE — ASSESSMENT & PLAN NOTE
Patient has stable normocytic anemia, without elevated RDW, likely early iron deficiency versus anemia of chronic disease/inflammation  -will need outpatient workup, including iron panel, colonoscopy  Continue to monitor closely

## 2021-09-26 NOTE — ASSESSMENT & PLAN NOTE
Acute coronary syndrome  Presented with dyspnea on exertion with elevated troponins concerning for NSTEMI  Patient underwent cardiac catheterization revealing severe left main disease, 90% ostial LAD, 90% ostial circumflex, 90% diagonal stenosis, 2 obtuse marginal vessels, left ventricular ejection fraction of 37%  Discuss with Cardiology patient needs [facility] for CT surgery evaluation  Plan to admit to med [facility]  Telemetry monitoring  Resume heparin drip for hours after TR band is completely removed  Continue with aspirin, losartan, metoprolol, statin  Patient has anxiety about surgery, would like wife and family present before making decision   CT surgery consulted    9/26/21: plan is for cabg tomorrow, preoperative evaluation done by ct surgery  On my exam patient has no complaints of chest pain or shortness of breath, continues to have significant anxiety over procedure    He denies fever, chills, nausea, abdominal pain     -continue heparin drip  -hold brillinta  -continue aspirin, metoprolol and statin

## 2021-09-26 NOTE — ASSESSMENT & PLAN NOTE
Lab Results   Component Value Date    HGBA1C 6 3 (H) 09/25/2021       Recent Labs     09/25/21  2053 09/26/21  0601 09/26/21  1109 09/26/21  1611   POCGLU 156* 96 145* 127       Blood Sugar Average: Last 72 hrs:  (P) 127 currently on 20 units    Restart his home dose of 36 units of glargine at discharge  Sliding scale  Avoid hypoglycemia

## 2021-09-26 NOTE — RESPIRATORY THERAPY NOTE
RT Protocol Note  Nguyễn Conrad 62 y o  male MRN: [id number]  Unit/Bed#: [facility] 404-01 Encounter: [id number]    Assessment    Principal Problem:    ACS (acute coronary syndrome) ([facility] )  Active Problems:    Type 2 diabetes mellitus with diabetic polyneuropathy, without long-term current use of insulin (HCC)    Essential hypertension    Anemia    Hyperlipidemia    CAD (coronary artery disease)      Home Pulmonary Medications:  none       Past Medical History:   Diagnosis Date   • Diabetes mellitus ([place] [address] )    • Hypertension      Social History     Socioeconomic History   • Marital status: /Civil Union     Spouse name: Not on file   • Number of children: Not on file   • Years of education: Not on file   • Highest education level: Not on file   Occupational History   • Occupation: [employer]      Employer: [employer]   Tobacco Use   • Smoking status: Never Smoker   • Smokeless tobacco: Never Used   Substance and Sexual Activity   • Alcohol use: Yes     Alcohol/week: 30 0 standard drinks     Types: 30 Cans of beer per week     Comment: 5-6 beers 4-5 x per week    • Drug use: Never   • Sexual activity: Not on file   Other Topics Concern   • Not on file   Social History Narrative   • Not on file     Social Determinants of Health     Financial Resource Strain:    • Difficulty of Paying Living Expenses:    Food Insecurity:    • Worried About Running Out of Food in the Last Year:    • Ran Out of Food in the Last Year:    Transportation Needs:    • Lack of Transportation (Medical):     • Lack of Transportation (Non-Medical):    Physical Activity:    • Days of Exercise per Week:    • Minutes of Exercise per Session:    Stress:    • Feeling of Stress :    Social Connections:    • Frequency of Communication with Friends and Family:    • Frequency of Social Gatherings with Friends and Family:    • Attends Holiness Services:    • Active Member of Clubs or Organizations:    • Attends Club or Organization Meetings:    • "Marital Status:    Intimate Partner Violence:    • Fear of Current or Ex-Partner:    • Emotionally Abused:    • Physically Abused:    • Sexually Abused:        Subjective         Objective    Physical Exam:   Bilateral Breath Sounds: (P) Diminished    Vitals:  Blood pressure 155/83, pulse 60, temperature 97 8 °F (36 6 °C), resp  rate 15, height 5' 7\" (1 702 m), weight 93 3 kg (205 lb 11 oz), SpO2 99 %  Imaging and other studies: I have personally reviewed pertinent reports  Plan    Respiratory Plan: (P) Discontinue Protocol        Resp Comments: (P) Pt with no pulm hx or meds at home  For surgery 9/27   Will reassess after surgery for respiratory needs     "

## 2021-09-26 NOTE — PROGRESS NOTES
Progress Note - Cardiothoracic Surgery   Nguyễn Conrad 62 y o  male MRN: [id number]  Unit/Bed#: [facility] 404-01 Encounter: [id number]      24 Hour Events: No issues overnight  Remains CP free on heparin gtt  Preop studies reviewed and adequate for CABG       Medications:   Scheduled Meds:  Current Facility-Administered Medications   Medication Dose Route Frequency Provider Last Rate   • acetaminophen  650 mg Oral Q6H PRN Lucia Broussard MD     • aspirin  81 mg Oral Daily Lucia Broussard MD     • atorvastatin  80 mg Oral QPM Lucia Broussard MD     • chlorhexidine  15 mL Mouth/Throat Q12H [address] Ingris Santo PA-C     • chlorhexidine  15 mL Swish & Spit Q12H [address] Sarika Rogel PA-C     • heparin (porcine)  3-20 Units/kg/hr (Order-Specific) Intravenous Titrated Sarika Rogel PA-C 15 1 Units/kg/hr (09/26/21 0705)   • heparin (porcine)  2,000 Units Intravenous Q1H PRN Lucia Broussard MD     • heparin (porcine)  4,000 Units Intravenous Q1H PRN Lucia Broussard MD     • insulin glargine  18 Units Subcutaneous HS Lam Thomas DO     • insulin lispro  1-6 Units Subcutaneous 4x Daily (AC & HS) Lucia Broussard MD     • metoprolol tartrate  25 mg Oral Q12H [address] Ingris Santo PA-C     • mupirocin  1 application Nasal Y88Q [address] Ingris Satno PA-C     • mupirocin  1 application Nasal Once Sarika Rogel PA-C     • nitroglycerin  0 4 mg Sublingual Q5 Min PRN Lucia Broussard MD     • ondansetron  4 mg Intravenous Q6H PRN Lucia Broussard MD     • oxyCODONE  5 mg Oral Q4H PRN Lucia Broussard MD       Continuous Infusions:heparin (porcine), 3-20 Units/kg/hr (Order-Specific), Last Rate: 15 1 Units/kg/hr (09/26/21 0705)        Results:   Results from last 7 days   Lab Units 09/24/21  0303 09/23/21  0219 09/22/21  1716   WBC Thousand/uL 7 41 7 03 7 47   HEMOGLOBIN g/dL 11 9* 11 9* 13 1   HEMATOCRIT % 36 5 35 9* 39 4   PLATELETS Thousands/uL 262 250 287     Results from last 7 days   Lab Units 09/26/21  0555 09/25/21  0103 09/24/21  0303   POTASSIUM mmol/L 4 3 4 0 3 9   CHLORIDE mmol/L 110* 109* 108   CO2 mmol/L 29 30 30   BUN mg/dL 19 19 19   CREATININE mg/dL 1 00 0 97 0 99   CALCIUM mg/dL 8 8 8 7 8 6     Results from last 7 days   Lab Units 09/26/21  0555 09/25/21  0116 09/25/21  0103 09/24/21  1834   INR   --   --  1 11  --    PTT seconds 91* 82*  --  65*       Studies:     Cardiac Catheterization: LM- 90 distal; LAD- 90 ostial, D1 90      Echocardiogram: EF 38%, borderline dilated LV w/ mildly increase thickness & mod reduced function, LA mildly dilated, AV sclerosis w/o AS or AI, mild MAC, mild MR, trace to mild TR, mild pulm HTN w/ PAP 41, trace pericardial effusion     Vein Mapping: adequate vein     Carotid artery duplex:   normal        Vitals:   Vitals:    09/25/21 2155 09/26/21 0756 09/26/21 0800 09/26/21 1110   BP: 140/75 126/64  155/83   Pulse: 55 62  60   Resp: 16 15  15   Temp: 97 6 °F (36 4 °C) 98 3 °F (36 8 °C)  97 8 °F (36 6 °C)   TempSrc:       SpO2: 99% 98% 97% 99%   Weight:       Height:           Physical Exam:    HEENT/NECK:  Normocephalic  Atraumatic   no jugular venous distention  Cardiac: Regular rate and rhythm  Pulmonary:  Breath sounds clear bilaterally and No rales/rhonchi/wheezes  Abdomen:  Non-tender and Non-distended  Extremities: Extremities warm/dry and Radial pulses 2+ bilaterally  Neuro: Alert and oriented X 3 and Sensation is grossly intact  Skin: Warm/Dry, without rashes or lesions  Assessment:    Severe coronary artery disease; Ongoing CABG workup    Plan:  Patient agreeable to proceed with surgery;  Informed consent signed  Blood type and cross match ordered; RBC prepared  Continue Mupirocin 2% nasal ointment q 12 hrs  Continue topical chlorhexidine bath and mouth rise  NPO after midnight  Discontinue heparin infusion on call to OR  Preoperative oxygen, beta blocker, insulin, and antibiotics ordered coronary artery bypass grafting scheduled for tomorrow with Dr Gricelda Mccray, LORI Ching PA-C  DATE: September 26, 2021  TIME: 12:27 PM    * This note was completed in part utilizing [employer] fluency direct voice recognition software  Grammatical errors, random word insertion, spelling mistakes, and incomplete sentences may be an occasional consequence of the system secondary to software limitations, ambient noise and hardware issues  At the time of dictation, efforts were made to edit, clarify and /or correct errors  Please read the chart carefully and recognize, using context, where substitutions have occurred  If you have any questions or concerns about the context, text or information contained within the body of this dictation, please contact myself, the provider, for further clarification

## 2021-09-26 NOTE — PROGRESS NOTES
[facility]  Progress Note - Quirino Baca 1960, 62 y o  male MRN: [id number]  Unit/Bed#: [facility] 404-01 Encounter: [id number]  Primary Care Provider: Vernell Trevino DO   Date and time admitted to hospital: 9/23/2021  5:17 PM    * ACS (acute coronary syndrome) [facility])  Assessment & Plan  Acute coronary syndrome  Presented with dyspnea on exertion with elevated troponins concerning for NSTEMI  Patient underwent cardiac catheterization revealing severe left main disease, 90% ostial LAD, 90% ostial circumflex, 90% diagonal stenosis, 2 obtuse marginal vessels, left ventricular ejection fraction of 37%  Discuss with Cardiology patient needs [facility] for CT surgery evaluation  [place] [facility]  [place] monitoring  Resume heparin drip for hours after TR band is completely removed  Continue with aspirin, losartan, metoprolol, statin  Patient has anxiety about surgery, would like wife and family present before making decision   CT surgery consulted    9/26/21: plan is for cabg tomorrow, preoperative evaluation done by ct surgery  On my exam patient has no complaints of chest pain or shortness of breath, continues to have significant anxiety over procedure    He denies fever, chills, nausea, abdominal pain     -continue heparin drip  -hold brillinta  -continue aspirin, metoprolol and statin      Hyperlipidemia  Assessment & Plan  Continue with high-dose statin    Anemia  Assessment & Plan  Patient has stable normocytic anemia, without elevated RDW, likely early iron deficiency versus anemia of chronic disease/inflammation  -will need outpatient workup, including iron panel, colonoscopy  Continue to monitor closely     Essential hypertension  Assessment & Plan  Continue with losartan  Continue to hold hctz      Type 2 diabetes mellitus with diabetic polyneuropathy, without long-term current use of insulin [facility])  Assessment & Plan  Lab Results   Component Value Date    HGBA1C 6 3 (H) 2021       Recent Labs     21  0601 21  1109 21  1611   POCGLU 156* 96 145* 127       Blood Sugar Average: Last 72 hrs:  (P) 127 currently on 20 units  Restart his home dose of 36 units of glargine at discharge  Sliding scale  Avoid hypoglycemia              VTE Pharmacologic Prophylaxis: VTE Score: 5 High Risk (Score >/= 5) - Pharmacological DVT Prophylaxis Ordered: heparin drip  Sequential Compression Devices Ordered  Patient Centered Rounds: I performed bedside rounds with nursing staff today  Discussions with Specialists or Other Care Team Provider: ct    Education and Discussions with Family / Patient: Patient declined call to   Time Spent for Care: 20 minutes  More than 50% of total time spent on counseling and coordination of care as described above  Current Length of Stay: 3 day(s)  Current Patient Status: Inpatient   Certification Statement: The patient will continue to require additional inpatient hospital stay due to cabg tomorrow  Discharge Plan: pending cabg    Code Status: Level 1 - Full Code    Subjective:   Patient denies any complaints today    Objective:     Vitals:   Temp (24hrs), Av 9 °F (36 6 °C), Min:97 6 °F (36 4 °C), Max:98 3 °F (36 8 °C)    Temp:  [97 6 °F (36 4 °C)-98 3 °F (36 8 °C)] 97 8 °F (36 6 °C)  HR:  [55-62] 58  Resp:  [15-17] 16  BP: (126-155)/(64-83) 149/81  SpO2:  [95 %-99 %] 95 %  Body mass index is 32 22 kg/m²  Input and Output Summary (last 24 hours): Intake/Output Summary (Last 24 hours) at 2021 1730  Last data filed at 2021 1409  Gross per 24 hour   Intake 475 74 ml   Output 2500 ml   Net -2024 26 ml       Physical Exam:   Physical Exam  Vitals and nursing note reviewed  Constitutional:       General: He is not in acute distress  Appearance: He is well-developed  He is not ill-appearing, toxic-appearing or diaphoretic     HENT:      Head: Normocephalic and atraumatic  Eyes:      General: No scleral icterus  Conjunctiva/sclera: Conjunctivae normal    Cardiovascular:      Rate and Rhythm: Normal rate and regular rhythm  Heart sounds: No murmur heard  No friction rub  No gallop  Pulmonary:      Effort: Pulmonary effort is normal  No respiratory distress  Breath sounds: Normal breath sounds  No stridor  No wheezing, rhonchi or rales  Chest:      Chest wall: No tenderness  Abdominal:      General: There is no distension  Palpations: Abdomen is soft  There is no mass  Tenderness: There is no abdominal tenderness  There is no right CVA tenderness, left CVA tenderness, guarding or rebound  Hernia: No hernia is present  Musculoskeletal:         General: No swelling, tenderness, deformity or signs of injury  Cervical back: Neck supple  Right lower leg: No edema  Left lower leg: No edema  Skin:     General: Skin is warm and dry  Coloration: Skin is not jaundiced or pale  Findings: No bruising, lesion or rash  Neurological:      Mental Status: He is alert and oriented to person, place, and time            Additional Data:     Labs:  Results from last 7 days   Lab Units 09/24/21  0303 09/22/21  1716   WBC Thousand/uL 7 41 7 47   HEMOGLOBIN g/dL 11 9* 13 1   HEMATOCRIT % 36 5 39 4   PLATELETS Thousands/uL 262 287   NEUTROS PCT %  --  66   LYMPHS PCT %  --  21   MONOS PCT %  --  10   EOS PCT %  --  2     Results from last 7 days   Lab Units 09/26/21  0555 09/23/21  0219   SODIUM mmol/L 140 143   POTASSIUM mmol/L 4 3 3 6   CHLORIDE mmol/L 110* 107   CO2 mmol/L 29 25   BUN mg/dL 19 21   CREATININE mg/dL 1 00 1 09   ANION GAP mmol/L 1* 11   CALCIUM mg/dL 8 8 8 8   ALBUMIN g/dL  --  3 5   TOTAL BILIRUBIN mg/dL  --  0 57   ALK PHOS U/L  --  74   ALT U/L  --  27   AST U/L  --  20   GLUCOSE RANDOM mg/dL 103 91     Results from last 7 days   Lab Units 09/25/21  0103   INR  1 11     Results from last 7 days   Lab Units 09/26/21  1611 09/26/21  1109 09/26/21  0601 09/25/21  2053 09/25/21  1601 09/25/21  1043 09/25/21  0622 09/24/21  2106 09/24/21  1603 09/24/21  1034 09/24/21  0545 09/23/21  2117   POC GLUCOSE mg/dl 127 145* 96 156* 113 167* 107 100 109 158* 95 151*     Results from last 7 days   Lab Units 09/25/21  0103   HEMOGLOBIN A1C % 6 3*           Lines/Drains:  Invasive Devices     Peripheral Intravenous Line            Peripheral IV 09/24/21 Right Forearm 2 days                  Telemetry:  Telemetry Orders (From admission, onward)             48 Hour Telemetry Monitoring  Continuous x 48 hours     Question:  Reason for 48 Hour Telemetry  Answer:  Arrhythmias Requiring Medical Therapy (eg  SVT, Vtach/fib, Bradycardia, Uncontrolled A-fib)                 Telemetry Reviewed: Normal Sinus Rhythm  Indication for Continued Telemetry Use: Awaiting PCI/EP Study/CABG           Imaging: No pertinent imaging reviewed      Recent Cultures (last 7 days):         Last 24 Hours Medication List:   Current Facility-Administered Medications   Medication Dose Route Frequency Provider Last Rate   • acetaminophen  650 mg Oral Q6H PRN Bob Conner MD     • aspirin  81 mg Oral Daily Bob Conner MD     • atorvastatin  80 mg Oral QPM Bob Conner MD     • chlorhexidine  15 mL Mouth/Throat Q12H [facility] Ingris Santo PA-C     • chlorhexidine  15 mL Swish & Spit Q12H [facility] ANNE Hagan     • heparin (porcine)  3-20 Units/kg/hr (Order-Specific) Intravenous Titrated ANNE Hagan 15 1 Units/kg/hr (09/26/21 1409)   • heparin (porcine)  2,000 Units Intravenous Q1H PRN Bob Conner MD     • heparin (porcine)  4,000 Units Intravenous Q1H PRN Bob Conner MD     • insulin glargine  18 Units Subcutaneous HS Lam Thomas DO     • insulin lispro  1-6 Units Subcutaneous 4x Daily (AC & HS) Bob Conner MD     • metoprolol tartrate  25 mg Oral Q12H [facility] Ingris Santo PA-C     • mupirocin  1 application Nasal S34C [facility] Ingris ANNE Santo     • mupirocin  1 application Nasal Once [facility][1], ANNE     • nitroglycerin  0 4 mg Sublingual Q5 Min PRN Peggy Nguyen MD     • ondansetron  4 mg Intravenous Q6H PRN Peggy Nguyen MD     • oxyCODONE  5 mg Oral Q4H PRN Peggy Nguyen MD          Today, Patient Was Seen By: Jessica Botello DO    **Please Note: This note may have been constructed using a voice recognition system  **

## 2021-09-27 ENCOUNTER — ANESTHESIA (INPATIENT)
Dept: PERIOP | Facility: HOSPITAL | Age: 61
DRG: 235 | End: 2021-09-27
Payer: COMMERCIAL

## 2021-09-27 ENCOUNTER — ANESTHESIA EVENT (INPATIENT)
Dept: PERIOP | Facility: HOSPITAL | Age: 61
DRG: 235 | End: 2021-09-27
Payer: COMMERCIAL

## 2021-09-27 ENCOUNTER — APPOINTMENT (INPATIENT)
Dept: RADIOLOGY | Facility: HOSPITAL | Age: 61
DRG: 235 | End: 2021-09-27
Payer: COMMERCIAL

## 2021-09-27 ENCOUNTER — APPOINTMENT (INPATIENT)
Dept: NON INVASIVE DIAGNOSTICS | Facility: HOSPITAL | Age: 61
DRG: 235 | End: 2021-09-27
Attending: THORACIC SURGERY (CARDIOTHORACIC VASCULAR SURGERY)
Payer: COMMERCIAL

## 2021-09-27 PROBLEM — Z95.1 S/P CABG X 2: Status: ACTIVE | Noted: 2021-09-27

## 2021-09-27 LAB
ABO GROUP BLD: NORMAL
ANION GAP SERPL CALCULATED.3IONS-SCNC: 4 MMOL/L (ref 4–13)
APTT PPP: 32 SECONDS (ref 23–37)
APTT PPP: >210 SECONDS (ref 23–37)
ATRIAL RATE: 52 BPM
BASE EXCESS BLDA CALC-SCNC: -1 MMOL/L (ref -2–3)
BASE EXCESS BLDA CALC-SCNC: -2 MMOL/L (ref -2–3)
BASE EXCESS BLDA CALC-SCNC: -2 MMOL/L (ref -2–3)
BASE EXCESS BLDA CALC-SCNC: -3 MMOL/L (ref -2–3)
BASE EXCESS BLDA CALC-SCNC: 0 MMOL/L (ref -2–3)
BASE EXCESS BLDA CALC-SCNC: 2 MMOL/L (ref -2–3)
BLD GP AB SCN SERPL QL: NEGATIVE
BUN SERPL-MCNC: 18 MG/DL (ref 5–25)
CA-I BLD-SCNC: 1.02 MMOL/L (ref 1.12–1.32)
CA-I BLD-SCNC: 1.06 MMOL/L (ref 1.12–1.32)
CA-I BLD-SCNC: 1.17 MMOL/L (ref 1.12–1.32)
CA-I BLD-SCNC: 1.21 MMOL/L (ref 1.12–1.32)
CA-I BLD-SCNC: 1.23 MMOL/L (ref 1.12–1.32)
CALCIUM SERPL-MCNC: 8 MG/DL (ref 8.3–10.1)
CHLORIDE SERPL-SCNC: 113 MMOL/L (ref 100–108)
CO2 SERPL-SCNC: 23 MMOL/L (ref 21–32)
CREAT SERPL-MCNC: 0.82 MG/DL (ref 0.6–1.3)
FIBRINOGEN PPP-MCNC: 264 MG/DL (ref 227–495)
FIO2 GAS DIL.REBREATH: 60 L
GFR SERPL CREATININE-BSD FRML MDRD: 96 ML/MIN/1.73SQ M
GLUCOSE SERPL-MCNC: 104 MG/DL (ref 65–140)
GLUCOSE SERPL-MCNC: 108 MG/DL (ref 65–140)
GLUCOSE SERPL-MCNC: 112 MG/DL (ref 65–140)
GLUCOSE SERPL-MCNC: 116 MG/DL (ref 65–140)
GLUCOSE SERPL-MCNC: 119 MG/DL (ref 65–140)
GLUCOSE SERPL-MCNC: 120 MG/DL (ref 65–140)
GLUCOSE SERPL-MCNC: 129 MG/DL (ref 65–140)
GLUCOSE SERPL-MCNC: 137 MG/DL (ref 65–140)
GLUCOSE SERPL-MCNC: 139 MG/DL (ref 65–140)
GLUCOSE SERPL-MCNC: 140 MG/DL (ref 65–140)
GLUCOSE SERPL-MCNC: 149 MG/DL (ref 65–140)
GLUCOSE SERPL-MCNC: 150 MG/DL (ref 65–140)
GLUCOSE SERPL-MCNC: 95 MG/DL (ref 65–140)
GLUCOSE SERPL-MCNC: 98 MG/DL (ref 65–140)
HCO3 BLDA-SCNC: 21.9 MMOL/L (ref 22–28)
HCO3 BLDA-SCNC: 22.3 MMOL/L (ref 22–28)
HCO3 BLDA-SCNC: 23.5 MMOL/L (ref 22–28)
HCO3 BLDA-SCNC: 24.6 MMOL/L (ref 24–30)
HCO3 BLDA-SCNC: 25.9 MMOL/L (ref 24–30)
HCO3 BLDA-SCNC: 28.5 MMOL/L (ref 22–28)
HCT VFR BLD AUTO: 39.3 % (ref 36.5–49.3)
HCT VFR BLD AUTO: 39.9 % (ref 36.5–49.3)
HCT VFR BLD CALC: 26 % (ref 36.5–49.3)
HCT VFR BLD CALC: 26 % (ref 36.5–49.3)
HCT VFR BLD CALC: 30 % (ref 36.5–49.3)
HCT VFR BLD CALC: 31 % (ref 36.5–49.3)
HCT VFR BLD CALC: 31 % (ref 36.5–49.3)
HCT VFR BLD CALC: 35 % (ref 36.5–49.3)
HGB BLD-MCNC: 13 G/DL (ref 12–17)
HGB BLD-MCNC: 13.1 G/DL (ref 12–17)
HGB BLDA-MCNC: 10.2 G/DL (ref 12–17)
HGB BLDA-MCNC: 10.5 G/DL (ref 12–17)
HGB BLDA-MCNC: 10.5 G/DL (ref 12–17)
HGB BLDA-MCNC: 11.9 G/DL (ref 12–17)
HGB BLDA-MCNC: 8.8 G/DL (ref 12–17)
HGB BLDA-MCNC: 8.8 G/DL (ref 12–17)
INR PPP: 1.26 (ref 0.84–1.19)
KCT BLD-ACNC: 121 SEC (ref 89–137)
KCT BLD-ACNC: 132 SEC (ref 89–137)
KCT BLD-ACNC: 357 SEC (ref 89–137)
KCT BLD-ACNC: 397 SEC (ref 89–137)
KCT BLD-ACNC: 484 SEC (ref 89–137)
KCT BLD-ACNC: 508 SEC (ref 89–137)
P AXIS: 58 DEGREES
PCO2 BLD: 23 MMOL/L (ref 21–32)
PCO2 BLD: 23 MMOL/L (ref 21–32)
PCO2 BLD: 25 MMOL/L (ref 21–32)
PCO2 BLD: 26 MMOL/L (ref 21–32)
PCO2 BLD: 27 MMOL/L (ref 21–32)
PCO2 BLD: 30 MMOL/L (ref 21–32)
PCO2 BLD: 36.9 MM HG (ref 36–44)
PCO2 BLD: 37.6 MM HG (ref 36–44)
PCO2 BLD: 41.3 MM HG (ref 36–44)
PCO2 BLD: 43.6 MM HG (ref 42–50)
PCO2 BLD: 47.5 MM HG (ref 42–50)
PCO2 BLD: 54 MM HG (ref 36–44)
PH BLD: 7.33 [PH] (ref 7.35–7.45)
PH BLD: 7.34 [PH] (ref 7.3–7.4)
PH BLD: 7.36 [PH] (ref 7.35–7.45)
PH BLD: 7.36 [PH] (ref 7.3–7.4)
PH BLD: 7.37 [PH] (ref 7.35–7.45)
PH BLD: 7.39 [PH] (ref 7.35–7.45)
PLATELET # BLD AUTO: 198 THOUSANDS/UL (ref 149–390)
PMV BLD AUTO: 10.5 FL (ref 8.9–12.7)
PO2 BLD: 108 MM HG (ref 75–129)
PO2 BLD: 143 MM HG (ref 75–129)
PO2 BLD: 32 MM HG (ref 35–45)
PO2 BLD: 47 MM HG (ref 35–45)
PO2 BLD: 91 MM HG (ref 75–129)
PO2 BLD: >400 MM HG (ref 75–129)
POTASSIUM BLD-SCNC: 3.9 MMOL/L (ref 3.5–5.3)
POTASSIUM BLD-SCNC: 4 MMOL/L (ref 3.5–5.3)
POTASSIUM BLD-SCNC: 4.3 MMOL/L (ref 3.5–5.3)
POTASSIUM BLD-SCNC: 4.3 MMOL/L (ref 3.5–5.3)
POTASSIUM BLD-SCNC: 4.5 MMOL/L (ref 3.5–5.3)
POTASSIUM BLD-SCNC: 4.9 MMOL/L (ref 3.5–5.3)
POTASSIUM SERPL-SCNC: 4 MMOL/L (ref 3.5–5.3)
POTASSIUM SERPL-SCNC: 4.1 MMOL/L (ref 3.5–5.3)
POTASSIUM SERPL-SCNC: 4.2 MMOL/L (ref 3.5–5.3)
PR INTERVAL: 204 MS
PROTHROMBIN TIME: 15.3 SECONDS (ref 11.6–14.5)
QRS AXIS: -12 DEGREES
QRSD INTERVAL: 86 MS
QT INTERVAL: 460 MS
QTC INTERVAL: 427 MS
RH BLD: POSITIVE
SAO2 % BLD FROM PO2: 58 % (ref 60–85)
SAO2 % BLD FROM PO2: 80 % (ref 60–85)
SAO2 % BLD FROM PO2: 97 % (ref 60–85)
SAO2 % BLD FROM PO2: 98 % (ref 60–85)
SAO2 % BLD FROM PO2: 99 % (ref 60–85)
SODIUM BLD-SCNC: 138 MMOL/L (ref 136–145)
SODIUM BLD-SCNC: 139 MMOL/L (ref 136–145)
SODIUM BLD-SCNC: 141 MMOL/L (ref 136–145)
SODIUM BLD-SCNC: 142 MMOL/L (ref 136–145)
SODIUM SERPL-SCNC: 140 MMOL/L (ref 136–145)
SPECIMEN EXPIRATION DATE: NORMAL
SPECIMEN SOURCE: ABNORMAL
SPECIMEN SOURCE: NORMAL
SPECIMEN SOURCE: NORMAL
T WAVE AXIS: 119 DEGREES
VENTRICULAR RATE: 52 BPM

## 2021-09-27 PROCEDURE — 86900 BLOOD TYPING SEROLOGIC ABO: CPT | Performed by: PHYSICIAN ASSISTANT

## 2021-09-27 PROCEDURE — 85018 HEMOGLOBIN: CPT | Performed by: PHYSICIAN ASSISTANT

## 2021-09-27 PROCEDURE — 82948 REAGENT STRIP/BLOOD GLUCOSE: CPT

## 2021-09-27 PROCEDURE — 86850 RBC ANTIBODY SCREEN: CPT | Performed by: PHYSICIAN ASSISTANT

## 2021-09-27 PROCEDURE — 84132 ASSAY OF SERUM POTASSIUM: CPT | Performed by: PHYSICIAN ASSISTANT

## 2021-09-27 PROCEDURE — 86901 BLOOD TYPING SEROLOGIC RH(D): CPT | Performed by: PHYSICIAN ASSISTANT

## 2021-09-27 PROCEDURE — 82803 BLOOD GASES ANY COMBINATION: CPT

## 2021-09-27 PROCEDURE — 84132 ASSAY OF SERUM POTASSIUM: CPT

## 2021-09-27 PROCEDURE — 80048 BASIC METABOLIC PNL TOTAL CA: CPT | Performed by: PHYSICIAN ASSISTANT

## 2021-09-27 PROCEDURE — 85014 HEMATOCRIT: CPT

## 2021-09-27 PROCEDURE — 33517 CABG ARTERY-VEIN SINGLE: CPT | Performed by: THORACIC SURGERY (CARDIOTHORACIC VASCULAR SURGERY)

## 2021-09-27 PROCEDURE — 021009W BYPASS CORONARY ARTERY, ONE ARTERY FROM AORTA WITH AUTOLOGOUS VENOUS TISSUE, OPEN APPROACH: ICD-10-PCS | Performed by: THORACIC SURGERY (CARDIOTHORACIC VASCULAR SURGERY)

## 2021-09-27 PROCEDURE — 93010 ELECTROCARDIOGRAM REPORT: CPT | Performed by: INTERNAL MEDICINE

## 2021-09-27 PROCEDURE — 93355 ECHO TRANSESOPHAGEAL (TEE): CPT

## 2021-09-27 PROCEDURE — 5A1221Z PERFORMANCE OF CARDIAC OUTPUT, CONTINUOUS: ICD-10-PCS | Performed by: THORACIC SURGERY (CARDIOTHORACIC VASCULAR SURGERY)

## 2021-09-27 PROCEDURE — 82947 ASSAY GLUCOSE BLOOD QUANT: CPT

## 2021-09-27 PROCEDURE — 33533 CABG ARTERIAL SINGLE: CPT | Performed by: THORACIC SURGERY (CARDIOTHORACIC VASCULAR SURGERY)

## 2021-09-27 PROCEDURE — 82330 ASSAY OF CALCIUM: CPT

## 2021-09-27 PROCEDURE — 30233N0 TRANSFUSION OF AUTOLOGOUS RED BLOOD CELLS INTO PERIPHERAL VEIN, PERCUTANEOUS APPROACH: ICD-10-PCS | Performed by: ANESTHESIOLOGY

## 2021-09-27 PROCEDURE — 84295 ASSAY OF SERUM SODIUM: CPT

## 2021-09-27 PROCEDURE — 85730 THROMBOPLASTIN TIME PARTIAL: CPT | Performed by: INTERNAL MEDICINE

## 2021-09-27 PROCEDURE — 94760 N-INVAS EAR/PLS OXIMETRY 1: CPT

## 2021-09-27 PROCEDURE — 85347 COAGULATION TIME ACTIVATED: CPT

## 2021-09-27 PROCEDURE — 85384 FIBRINOGEN ACTIVITY: CPT | Performed by: PHYSICIAN ASSISTANT

## 2021-09-27 PROCEDURE — 85610 PROTHROMBIN TIME: CPT | Performed by: PHYSICIAN ASSISTANT

## 2021-09-27 PROCEDURE — 06BQ4ZZ EXCISION OF LEFT SAPHENOUS VEIN, PERCUTANEOUS ENDOSCOPIC APPROACH: ICD-10-PCS | Performed by: THORACIC SURGERY (CARDIOTHORACIC VASCULAR SURGERY)

## 2021-09-27 PROCEDURE — 93005 ELECTROCARDIOGRAM TRACING: CPT

## 2021-09-27 PROCEDURE — 99232 SBSQ HOSP IP/OBS MODERATE 35: CPT | Performed by: INTERNAL MEDICINE

## 2021-09-27 PROCEDURE — 02100Z9 BYPASS CORONARY ARTERY, ONE ARTERY FROM LEFT INTERNAL MAMMARY, OPEN APPROACH: ICD-10-PCS | Performed by: THORACIC SURGERY (CARDIOTHORACIC VASCULAR SURGERY)

## 2021-09-27 PROCEDURE — 99291 CRITICAL CARE FIRST HOUR: CPT | Performed by: STUDENT IN AN ORGANIZED HEALTH CARE EDUCATION/TRAINING PROGRAM

## 2021-09-27 PROCEDURE — 85730 THROMBOPLASTIN TIME PARTIAL: CPT | Performed by: PHYSICIAN ASSISTANT

## 2021-09-27 PROCEDURE — 71045 X-RAY EXAM CHEST 1 VIEW: CPT

## 2021-09-27 PROCEDURE — 5A1223Z PERFORMANCE OF CARDIAC PACING, CONTINUOUS: ICD-10-PCS | Performed by: THORACIC SURGERY (CARDIOTHORACIC VASCULAR SURGERY)

## 2021-09-27 PROCEDURE — 85049 AUTOMATED PLATELET COUNT: CPT | Performed by: PHYSICIAN ASSISTANT

## 2021-09-27 PROCEDURE — 85014 HEMATOCRIT: CPT | Performed by: PHYSICIAN ASSISTANT

## 2021-09-27 PROCEDURE — 02HV33Z INSERTION OF INFUSION DEVICE INTO SUPERIOR VENA CAVA, PERCUTANEOUS APPROACH: ICD-10-PCS | Performed by: ANESTHESIOLOGY

## 2021-09-27 PROCEDURE — 33508 ENDOSCOPIC VEIN HARVEST: CPT | Performed by: THORACIC SURGERY (CARDIOTHORACIC VASCULAR SURGERY)

## 2021-09-27 PROCEDURE — 94002 VENT MGMT INPAT INIT DAY: CPT

## 2021-09-27 DEVICE — MARKER CORONARY BYPASS VOSS GRAFT: Type: IMPLANTABLE DEVICE | Site: AORTA | Status: FUNCTIONAL

## 2021-09-27 RX ORDER — SODIUM CHLORIDE, SODIUM LACTATE, POTASSIUM CHLORIDE, CALCIUM CHLORIDE 600; 310; 30; 20 MG/100ML; MG/100ML; MG/100ML; MG/100ML
INJECTION, SOLUTION INTRAVENOUS CONTINUOUS PRN
Status: DISCONTINUED | OUTPATIENT
Start: 2021-09-27 | End: 2021-09-27

## 2021-09-27 RX ORDER — ALBUMIN, HUMAN INJ 5% 5 %
12.5 SOLUTION INTRAVENOUS ONCE
Status: COMPLETED | OUTPATIENT
Start: 2021-09-27 | End: 2021-09-27

## 2021-09-27 RX ORDER — AMINOCAPROIC ACID 250 MG/ML
INJECTION, SOLUTION INTRAVENOUS AS NEEDED
Status: DISCONTINUED | OUTPATIENT
Start: 2021-09-27 | End: 2021-09-27

## 2021-09-27 RX ORDER — ACETAMINOPHEN 650 MG/1
650 SUPPOSITORY RECTAL EVERY 4 HOURS PRN
Status: DISCONTINUED | OUTPATIENT
Start: 2021-09-27 | End: 2021-09-28

## 2021-09-27 RX ORDER — ALBUMIN, HUMAN INJ 5% 5 %
SOLUTION INTRAVENOUS
Status: COMPLETED
Start: 2021-09-27 | End: 2021-09-27

## 2021-09-27 RX ORDER — ASPIRIN 325 MG
325 TABLET ORAL DAILY
Status: DISCONTINUED | OUTPATIENT
Start: 2021-09-27 | End: 2021-09-30 | Stop reason: HOSPADM

## 2021-09-27 RX ORDER — POTASSIUM CHLORIDE 14.9 MG/ML
20 INJECTION INTRAVENOUS ONCE AS NEEDED
Status: DISCONTINUED | OUTPATIENT
Start: 2021-09-27 | End: 2021-09-28

## 2021-09-27 RX ORDER — CEFAZOLIN SODIUM 2 G/50ML
2000 SOLUTION INTRAVENOUS EVERY 8 HOURS
Status: COMPLETED | OUTPATIENT
Start: 2021-09-27 | End: 2021-09-28

## 2021-09-27 RX ORDER — HEPARIN SODIUM 1000 [USP'U]/ML
INJECTION, SOLUTION INTRAVENOUS; SUBCUTANEOUS AS NEEDED
Status: DISCONTINUED | OUTPATIENT
Start: 2021-09-27 | End: 2021-09-27

## 2021-09-27 RX ORDER — GLYCOPYRROLATE 0.2 MG/ML
INJECTION INTRAMUSCULAR; INTRAVENOUS AS NEEDED
Status: DISCONTINUED | OUTPATIENT
Start: 2021-09-27 | End: 2021-09-27

## 2021-09-27 RX ORDER — POTASSIUM CHLORIDE 14.9 MG/ML
20 INJECTION INTRAVENOUS
Status: DISCONTINUED | OUTPATIENT
Start: 2021-09-27 | End: 2021-09-28

## 2021-09-27 RX ORDER — PROTAMINE SULFATE 10 MG/ML
INJECTION, SOLUTION INTRAVENOUS AS NEEDED
Status: DISCONTINUED | OUTPATIENT
Start: 2021-09-27 | End: 2021-09-27

## 2021-09-27 RX ORDER — FENTANYL CITRATE 50 UG/ML
50 INJECTION, SOLUTION INTRAMUSCULAR; INTRAVENOUS ONCE
Status: DISCONTINUED | OUTPATIENT
Start: 2021-09-27 | End: 2021-09-28

## 2021-09-27 RX ORDER — CHLORHEXIDINE GLUCONATE 0.12 MG/ML
15 RINSE ORAL 2 TIMES DAILY
Status: DISCONTINUED | OUTPATIENT
Start: 2021-09-27 | End: 2021-09-30 | Stop reason: HOSPADM

## 2021-09-27 RX ORDER — NEOSTIGMINE METHYLSULFATE 1 MG/ML
INJECTION INTRAVENOUS AS NEEDED
Status: DISCONTINUED | OUTPATIENT
Start: 2021-09-27 | End: 2021-09-27

## 2021-09-27 RX ORDER — PROPOFOL 10 MG/ML
INJECTION, EMULSION INTRAVENOUS AS NEEDED
Status: DISCONTINUED | OUTPATIENT
Start: 2021-09-27 | End: 2021-09-27

## 2021-09-27 RX ORDER — BISACODYL 10 MG
10 SUPPOSITORY, RECTAL RECTAL DAILY PRN
Status: DISCONTINUED | OUTPATIENT
Start: 2021-09-27 | End: 2021-09-30 | Stop reason: HOSPADM

## 2021-09-27 RX ORDER — ONDANSETRON 2 MG/ML
4 INJECTION INTRAMUSCULAR; INTRAVENOUS EVERY 6 HOURS PRN
Status: DISCONTINUED | OUTPATIENT
Start: 2021-09-27 | End: 2021-09-30 | Stop reason: HOSPADM

## 2021-09-27 RX ORDER — AMIODARONE HYDROCHLORIDE 200 MG/1
200 TABLET ORAL EVERY 8 HOURS SCHEDULED
Status: DISCONTINUED | OUTPATIENT
Start: 2021-09-27 | End: 2021-09-30 | Stop reason: HOSPADM

## 2021-09-27 RX ORDER — MAGNESIUM SULFATE HEPTAHYDRATE 40 MG/ML
2 INJECTION, SOLUTION INTRAVENOUS ONCE
Status: COMPLETED | OUTPATIENT
Start: 2021-09-27 | End: 2021-09-27

## 2021-09-27 RX ORDER — VANCOMYCIN HYDROCHLORIDE 1 G/20ML
INJECTION, POWDER, LYOPHILIZED, FOR SOLUTION INTRAVENOUS AS NEEDED
Status: DISCONTINUED | OUTPATIENT
Start: 2021-09-27 | End: 2021-09-27 | Stop reason: HOSPADM

## 2021-09-27 RX ORDER — SODIUM CHLORIDE 450 MG/100ML
20 INJECTION, SOLUTION INTRAVENOUS CONTINUOUS
Status: DISCONTINUED | OUTPATIENT
Start: 2021-09-27 | End: 2021-09-30

## 2021-09-27 RX ORDER — SUCCINYLCHOLINE/SOD CL,ISO/PF 100 MG/5ML
SYRINGE (ML) INTRAVENOUS AS NEEDED
Status: DISCONTINUED | OUTPATIENT
Start: 2021-09-27 | End: 2021-09-27

## 2021-09-27 RX ORDER — FENTANYL CITRATE 50 UG/ML
INJECTION, SOLUTION INTRAMUSCULAR; INTRAVENOUS AS NEEDED
Status: DISCONTINUED | OUTPATIENT
Start: 2021-09-27 | End: 2021-09-27

## 2021-09-27 RX ORDER — CALCIUM CHLORIDE 100 MG/ML
1 INJECTION INTRAVENOUS; INTRAVENTRICULAR ONCE
Status: DISCONTINUED | OUTPATIENT
Start: 2021-09-27 | End: 2021-09-28

## 2021-09-27 RX ORDER — HYDROMORPHONE HCL/PF 1 MG/ML
0.5 SYRINGE (ML) INJECTION EVERY 2 HOUR PRN
Status: DISCONTINUED | OUTPATIENT
Start: 2021-09-27 | End: 2021-09-28

## 2021-09-27 RX ORDER — FUROSEMIDE 10 MG/ML
40 INJECTION INTRAMUSCULAR; INTRAVENOUS EVERY 6 HOURS PRN
Status: DISCONTINUED | OUTPATIENT
Start: 2021-09-27 | End: 2021-09-28

## 2021-09-27 RX ORDER — FENTANYL CITRATE 50 UG/ML
50 INJECTION, SOLUTION INTRAMUSCULAR; INTRAVENOUS
Status: DISCONTINUED | OUTPATIENT
Start: 2021-09-27 | End: 2021-09-28

## 2021-09-27 RX ORDER — POLYETHYLENE GLYCOL 3350 17 G/17G
17 POWDER, FOR SOLUTION ORAL DAILY
Status: DISCONTINUED | OUTPATIENT
Start: 2021-09-28 | End: 2021-09-30 | Stop reason: HOSPADM

## 2021-09-27 RX ORDER — PANTOPRAZOLE SODIUM 40 MG/1
40 TABLET, DELAYED RELEASE ORAL DAILY
Status: DISCONTINUED | OUTPATIENT
Start: 2021-09-27 | End: 2021-09-30 | Stop reason: HOSPADM

## 2021-09-27 RX ORDER — OXYCODONE HYDROCHLORIDE 5 MG/1
2.5 TABLET ORAL EVERY 4 HOURS PRN
Status: DISCONTINUED | OUTPATIENT
Start: 2021-09-27 | End: 2021-09-30 | Stop reason: HOSPADM

## 2021-09-27 RX ORDER — ESMOLOL HYDROCHLORIDE 10 MG/ML
INJECTION INTRAVENOUS AS NEEDED
Status: DISCONTINUED | OUTPATIENT
Start: 2021-09-27 | End: 2021-09-27

## 2021-09-27 RX ORDER — MAGNESIUM HYDROXIDE 1200 MG/15ML
LIQUID ORAL AS NEEDED
Status: DISCONTINUED | OUTPATIENT
Start: 2021-09-27 | End: 2021-09-27 | Stop reason: HOSPADM

## 2021-09-27 RX ORDER — KETAMINE HYDROCHLORIDE 100 MG/ML
INJECTION, SOLUTION INTRAMUSCULAR; INTRAVENOUS AS NEEDED
Status: DISCONTINUED | OUTPATIENT
Start: 2021-09-27 | End: 2021-09-27

## 2021-09-27 RX ORDER — ATORVASTATIN CALCIUM 80 MG/1
80 TABLET, FILM COATED ORAL
Status: DISCONTINUED | OUTPATIENT
Start: 2021-09-27 | End: 2021-09-30 | Stop reason: HOSPADM

## 2021-09-27 RX ORDER — SODIUM CHLORIDE 9 MG/ML
INJECTION, SOLUTION INTRAVENOUS CONTINUOUS PRN
Status: DISCONTINUED | OUTPATIENT
Start: 2021-09-27 | End: 2021-09-27

## 2021-09-27 RX ORDER — ACETAMINOPHEN 325 MG/1
975 TABLET ORAL EVERY 8 HOURS
Status: DISCONTINUED | OUTPATIENT
Start: 2021-09-27 | End: 2021-09-30 | Stop reason: HOSPADM

## 2021-09-27 RX ORDER — OXYCODONE HYDROCHLORIDE 5 MG/1
5 TABLET ORAL EVERY 4 HOURS PRN
Status: DISCONTINUED | OUTPATIENT
Start: 2021-09-27 | End: 2021-09-30 | Stop reason: HOSPADM

## 2021-09-27 RX ORDER — FONDAPARINUX SODIUM 2.5 MG/.5ML
2.5 INJECTION SUBCUTANEOUS DAILY
Status: DISCONTINUED | OUTPATIENT
Start: 2021-09-28 | End: 2021-09-30 | Stop reason: HOSPADM

## 2021-09-27 RX ORDER — CEFAZOLIN SODIUM 2 G/50ML
2000 SOLUTION INTRAVENOUS ONCE
Status: COMPLETED | OUTPATIENT
Start: 2021-09-27 | End: 2021-09-27

## 2021-09-27 RX ORDER — ROCURONIUM BROMIDE 10 MG/ML
INJECTION, SOLUTION INTRAVENOUS AS NEEDED
Status: DISCONTINUED | OUTPATIENT
Start: 2021-09-27 | End: 2021-09-27

## 2021-09-27 RX ORDER — MELATONIN
1000 DAILY
Status: DISCONTINUED | OUTPATIENT
Start: 2021-09-27 | End: 2021-09-30 | Stop reason: HOSPADM

## 2021-09-27 RX ADMIN — ROCURONIUM BROMIDE 50 MG: 50 INJECTION, SOLUTION INTRAVENOUS at 08:05

## 2021-09-27 RX ADMIN — CEFAZOLIN SODIUM 2000 MG: 2 SOLUTION INTRAVENOUS at 08:27

## 2021-09-27 RX ADMIN — CEFAZOLIN SODIUM 2000 MG: 2 SOLUTION INTRAVENOUS at 19:23

## 2021-09-27 RX ADMIN — FENTANYL CITRATE 1000 MCG: 50 INJECTION INTRAMUSCULAR; INTRAVENOUS at 08:56

## 2021-09-27 RX ADMIN — ALBUMIN (HUMAN) 12.5 G: 12.5 INJECTION, SOLUTION INTRAVENOUS at 14:30

## 2021-09-27 RX ADMIN — ALBUMIN, HUMAN INJ 5% 12.5 G: 5 SOLUTION at 14:30

## 2021-09-27 RX ADMIN — PHENYLEPHRINE HYDROCHLORIDE 30 MCG/MIN: 10 INJECTION INTRAVENOUS at 11:34

## 2021-09-27 RX ADMIN — MAGNESIUM SULFATE HEPTAHYDRATE 2 G: 40 INJECTION, SOLUTION INTRAVENOUS at 13:00

## 2021-09-27 RX ADMIN — AMINOCAPROIC ACID 5 G: 250 INJECTION, SOLUTION INTRAVENOUS at 08:42

## 2021-09-27 RX ADMIN — ACETAMINOPHEN 975 MG: 325 TABLET, FILM COATED ORAL at 21:18

## 2021-09-27 RX ADMIN — SODIUM CHLORIDE 20 ML/HR: 0.45 INJECTION, SOLUTION INTRAVENOUS at 12:45

## 2021-09-27 RX ADMIN — PROTAMINE SULFATE 290 MG: 10 INJECTION, SOLUTION INTRAVENOUS at 11:18

## 2021-09-27 RX ADMIN — HEPARIN SODIUM 37000 UNITS: 1000 INJECTION INTRAVENOUS; SUBCUTANEOUS at 09:42

## 2021-09-27 RX ADMIN — PROTAMINE SULFATE 10 MG: 10 INJECTION, SOLUTION INTRAVENOUS at 11:17

## 2021-09-27 RX ADMIN — NEOSTIGMINE METHYLSULFATE 5 MG: 1 INJECTION INTRAVENOUS at 12:11

## 2021-09-27 RX ADMIN — KETAMINE HYDROCHLORIDE 50 MG: 100 INJECTION INTRAMUSCULAR; INTRAVENOUS at 08:56

## 2021-09-27 RX ADMIN — MUPIROCIN 1 APPLICATION.: 20 OINTMENT TOPICAL at 21:18

## 2021-09-27 RX ADMIN — ROCURONIUM BROMIDE 30 MG: 50 INJECTION, SOLUTION INTRAVENOUS at 11:04

## 2021-09-27 RX ADMIN — PROPOFOL 170 MG: 10 INJECTION, EMULSION INTRAVENOUS at 08:01

## 2021-09-27 RX ADMIN — CEFAZOLIN SODIUM 2000 MG: 2 SOLUTION INTRAVENOUS at 12:11

## 2021-09-27 RX ADMIN — SODIUM CHLORIDE: 0.9 INJECTION, SOLUTION INTRAVENOUS at 07:43

## 2021-09-27 RX ADMIN — ESMOLOL HYDROCHLORIDE 100 MG: 100 INJECTION, SOLUTION INTRAVENOUS at 08:03

## 2021-09-27 RX ADMIN — OXYCODONE HYDROCHLORIDE 5 MG: 5 TABLET ORAL at 19:23

## 2021-09-27 RX ADMIN — ROCURONIUM BROMIDE 50 MG: 50 INJECTION, SOLUTION INTRAVENOUS at 08:50

## 2021-09-27 RX ADMIN — OXYCODONE HYDROCHLORIDE 5 MG: 5 TABLET ORAL at 14:55

## 2021-09-27 RX ADMIN — HEPARIN SODIUM 10000 UNITS: 1000 INJECTION INTRAVENOUS; SUBCUTANEOUS at 09:59

## 2021-09-27 RX ADMIN — DEXMEDETOMIDINE 0.3 MCG/KG/HR: 100 INJECTION, SOLUTION, CONCENTRATE INTRAVENOUS at 09:39

## 2021-09-27 RX ADMIN — SODIUM CHLORIDE 3 UNITS/HR: 9 INJECTION, SOLUTION INTRAVENOUS at 11:08

## 2021-09-27 RX ADMIN — SODIUM CHLORIDE, SODIUM LACTATE, POTASSIUM CHLORIDE, AND CALCIUM CHLORIDE: .6; .31; .03; .02 INJECTION, SOLUTION INTRAVENOUS at 08:23

## 2021-09-27 RX ADMIN — AMINOCAPROIC ACID 1 G/HR: 250 INJECTION, SOLUTION INTRAVENOUS at 08:42

## 2021-09-27 RX ADMIN — AMIODARONE HYDROCHLORIDE 200 MG: 200 TABLET ORAL at 21:18

## 2021-09-27 RX ADMIN — GLYCOPYRROLATE 0.8 MG: 0.2 INJECTION, SOLUTION INTRAMUSCULAR; INTRAVENOUS at 12:11

## 2021-09-27 RX ADMIN — METOPROLOL TARTRATE 12.5 MG: 25 TABLET, FILM COATED ORAL at 06:17

## 2021-09-27 RX ADMIN — HYDROMORPHONE HYDROCHLORIDE 0.5 MG: 1 INJECTION, SOLUTION INTRAMUSCULAR; INTRAVENOUS; SUBCUTANEOUS at 13:00

## 2021-09-27 RX ADMIN — HYDROMORPHONE HYDROCHLORIDE 0.5 MG: 1 INJECTION, SOLUTION INTRAMUSCULAR; INTRAVENOUS; SUBCUTANEOUS at 15:39

## 2021-09-27 RX ADMIN — Medication 100 MG: at 08:01

## 2021-09-27 RX ADMIN — LIDOCAINE HYDROCHLORIDE 100 MG: 20 INJECTION, SOLUTION INTRAVENOUS at 08:01

## 2021-09-27 NOTE — ANESTHESIA PROCEDURE NOTES
Introducer/Geremias-Neeta  Performed by: Jenelle Huffman MD  Authorized by: Jenelle Huffman MD     Date/Time: 9/27/2021 8:23 AM  Consent: Written consent obtained  Risks and benefits: risks, benefits and alternatives were discussed  Consent given by: patient  Patient understanding: patient states understanding of the procedure being performed  Patient consent: the patient's understanding of the procedure matches consent given  Procedure consent: procedure consent matches procedure scheduled  Required items: required blood products, implants, devices, and special equipment available  Patient identity confirmed: arm band  Time out: Immediately prior to procedure a "time out" was called to verify the correct patient, procedure, equipment, support staff and site/side marked as required    Indications: vascular access and central pressure monitoring  Location details: right internal jugular  Patient position: Trendelenburg    Sedation:  Patient sedated: yes    Assessment: blood return through all ports and free fluid flow  Preparation: skin prepped with ChloraPrep  Skin prep agent dried: skin prep agent completely dried prior to procedure  Sterile barriers: all five maximum sterile barriers used - cap, mask, sterile gown, sterile gloves, and large sterile sheet  Hand hygiene: hand hygiene performed prior to central venous catheter insertion  Ultrasound guidance: yes  ultrasound permanent image saved  Pre-procedure: landmarks identified  Number of attempts: 1  Successful placement: yes  Post-procedure: line sutured and dressing applied  Patient tolerance: patient tolerated the procedure well with no immediate complications  Comments: Procedure start 0808  Procedure end [id number]

## 2021-09-27 NOTE — ASSESSMENT & PLAN NOTE
Acute coronary syndrome  Presented with dyspnea on exertion with elevated troponins concerning for NSTEMI  Patient underwent cardiac catheterization revealing severe left main disease, 90% ostial LAD, 90% ostial circumflex, 90% diagonal stenosis, 2 obtuse marginal vessels, left ventricular ejection fraction of 37%  Discuss with Cardiology patient needs [facility] for CT surgery evaluation  Plan [place] [facility]  Telemetry monitoring  Resume heparin drip for hours after TR band is completely removed  Continue with aspirin, losartan, metoprolol, statin  Patient has anxiety about surgery, would like wife and family present before making decision   CT surgery consulted    9/27/21: plan is for cabg today, preoperative evaluation done by ct surgery  On my exam patient has no complaints of chest pain or shortness of breath, continues to have significant anxiety over procedure  He denies fever, chills, nausea, abdominal pain      -heparin drip stopped preop  -brillinta has been held since last week  -continue aspirin, metoprolol and statin

## 2021-09-27 NOTE — OP NOTE
OPERATIVE REPORT  PATIENT NAME: Liliana Dockery    :  1960  MRN: [id number]  Pt Location: BE OR ROOM 09    SURGERY DATE: 2021    SURGEON: Shante Martinez MD    ASSISTANT: Hilda Mcfadden PA-C    ADDITIONAL ASSISTANT: None    PREOPERATIVE DIAGNOSIS:  Left main coronary artery disease    POSTOPERATIVE DIAGNOSIS:  Left main coronary artery disease    NYHA Class: 4    CCS Class: 4    PROCEDURE: Coronary artery bypass grafting x 2 with left internal mammary artery to left anterior descending, saphenous vein graft to left posterolateral branch  ANESTHESIA: General endotracheal anesthesia with transesophageal echocardiogram guidance, Dr Veronica Zarco: 41 minutes  CROSSCLAMP TIME: 48 minutes  PACKS/TUBES/DRAINS: Chest tubes x 3  MATERIALS: Pacing wires: A x1  V x1  TRANSFUSION: None  SPECIMENS: None  ESTIMATED BLOOD LOSS: 200 mL    OPERATIVE TECHNIQUE:    The patient was taken to the operating room and placed supine on the operating table  Following the satisfactory induction of general anesthesia and placement of monitoring lines, the patient was prepped and draped in the usual sterile fashion  A time-out procedure was performed  The patient underwent median sternotomy, LIMA harvest, endoscopic left greater saphenous vein harvest, systemic heparinization and conduit preparation  The patient underwent pericardiotomy and epiaortic ultrasound was used to evaluate the ascending aorta, which was found to be free of significant atheromatous disease  The patient underwent aortic and right atrial cannulation and was initiated on bypass  The ascending aorta was crossclamped  [place] cardioplegia was delivered with an excellent arrest     The saphenous vein was anastomosed to the left posterolateral branch in end-to-side fashion using running 7-0 Prolene suture   The left internal mammary artery was anastomosed to the left anterior descending in end-to-side fashion using running 7-0 Prolene suture  The quality of both grafts was excellent  A total of 1 proximal anastomosis was completed on the ascending aorta in end-to-side fashion using running 5-0 Prolene suture  The heart was de-aired and the crossclamp was removed  Atrial and ventricular pacing wires were placed  Following a period of reperfusion, the patient was weaned from cardiopulmonary bypass and decannulated  Protamine was administered with normalization of the ACT  Hemostasis was confirmed in all fields  Thoracostomy tubes were placed  The sternum was closed with stainless steel wires  The fascia, subdermis and skin were closed with multiple layers of running absorbable suture  As the attending surgeonAMISHA was present and scrubbed for all critical portions of this procedure  Sponge, needle and instrument counts were reported as correct by the nursing staff  There is no cardiac residency program at this facility and for complex procedures such as this, it is vital to have a physician assistant experienced in cardiac surgery  The assistance of Hilda Mcfadden PA-C was necessary for endoscopic saphenous vein harvesting, retraction of the heart and coronary conduit with proper tissue handling techniques, and following of the suture with correct tension during construction of the proximal and distal coronary anastomoses  Final transesophageal echocardiogram demonstrated LVEF of 45%, unchanged to perhaps slightly improved from the preoperative evaluation          Allison Pradhan MD  DATE: September 27, 2021  TIME: 12:06 PM

## 2021-09-27 NOTE — PROGRESS NOTES
[facility] [facility]  Progress Note - Jh Adams 1960, 62 y o  male MRN: [id number]  Unit/Bed#: [facility] 414-01 Encounter: [id number]  Primary Care Provider: Cesar Stephens DO   Date and time admitted to hospital: 9/23/2021  5:17 PM    * ACS (acute coronary syndrome) [facility])  Assessment & Plan  Acute coronary syndrome  Presented with dyspnea on exertion with elevated troponins concerning for NSTEMI  Patient underwent cardiac catheterization revealing severe left main disease, 90% ostial LAD, 90% ostial circumflex, 90% diagonal stenosis, 2 obtuse marginal vessels, left ventricular ejection fraction of 37%  Discuss with Cardiology patient needs [facility] for CT surgery evaluation  Plan to admit to med [facility]  Telemetry monitoring  Resume heparin drip for hours after TR band is completely removed  Continue with aspirin, losartan, metoprolol, statin  Patient has anxiety about surgery, would like wife and family present before making decision   CT surgery consulted    9/27/21: plan is for cabg today, preoperative evaluation done by ct surgery  On my exam patient has no complaints of chest pain or shortness of breath, continues to have significant anxiety over procedure  He denies fever, chills, nausea, abdominal pain      -heparin drip stopped preop  -brillinta has been held since last week  -continue aspirin, metoprolol and statin      Hyperlipidemia  Assessment & Plan  Continue with high-dose statin    Anemia  Assessment & Plan  Patient has stable normocytic anemia, without elevated RDW, likely early iron deficiency versus anemia of chronic disease/inflammation  -will need outpatient workup, including iron panel, colonoscopy  Continue to monitor closely     Essential hypertension  Assessment & Plan  Continue with losartan  Continue to hold hctz      Type 2 diabetes mellitus with diabetic polyneuropathy, without long-term current use of insulin (HCC)  Assessment & Plan  Lab Results   Component Value Date    HGBA1C 6 3 (H) 2021       Recent Labs     21  2058 21  0530 21  1240 21  1422   POCGLU 121 104 119 120       Blood Sugar Average: Last 72 hrs:  (P) 698 [id number] currently on 20 units  Restart his home dose of 36 units of glargine at discharge  Sliding scale  Avoid hypoglycemia              VTE Pharmacologic Prophylaxis: VTE Score: 5 Moderate Risk (Score 3-4) - Pharmacological DVT Prophylaxis Ordered: heparin drip  Patient Centered Rounds: I performed bedside rounds with nursing staff today  Discussions with Specialists or Other Care Team Provider: n/a    Education and Discussions with Family / Patient: Patient declined call to   Time Spent for Care: 20 minutes  More than 50% of total time spent on counseling and coordination of care as described above  Current Length of Stay: 4 day(s)  Current Patient Status: Inpatient   Certification Statement: The patient will continue to require additional inpatient hospital stay due to cabg  Discharge Plan: pending cabg    Code Status: Level 1 - Full Code    Subjective:   Patient without any complaints  Objective:     Vitals:   Temp (24hrs), Av 1 °F (36 7 °C), Min:97 8 °F (36 6 °C), Max:98 6 °F (37 °C)    Temp:  [97 8 °F (36 6 °C)-98 6 °F (37 °C)] 98 °F (36 7 °C)  HR:  [58-61] 60  Resp:  [16-18] 18  BP: (147-164)/(79-88) 164/88  SpO2:  [95 %-100 %] 100 %  Body mass index is 31 89 kg/m²  Input and Output Summary (last 24 hours): Intake/Output Summary (Last 24 hours) at 2021 1444  Last data filed at 2021 1156  Gross per 24 hour   Intake 3122 17 ml   Output 1800 ml   Net 1322 17 ml       Physical Exam:   Physical Exam  Vitals and nursing note reviewed  Constitutional:       General: He is not in acute distress  Appearance: He is well-developed  He is not ill-appearing, toxic-appearing or diaphoretic     HENT:      Head: Normocephalic and atraumatic  Eyes:      General: No scleral icterus  Conjunctiva/sclera: Conjunctivae normal    Cardiovascular:      Rate and Rhythm: Normal rate and regular rhythm  Heart sounds: No murmur heard  No friction rub  No gallop  Pulmonary:      Effort: Pulmonary effort is normal  No respiratory distress  Breath sounds: Normal breath sounds  No stridor  No wheezing, rhonchi or rales  Chest:      Chest wall: No tenderness  Abdominal:      General: There is no distension  Palpations: Abdomen is soft  There is no mass  Tenderness: There is no abdominal tenderness  There is no right CVA tenderness, left CVA tenderness, guarding or rebound  Hernia: No hernia is present  Musculoskeletal:         General: No swelling, tenderness, deformity or signs of injury  Cervical back: Neck supple  Right lower leg: No edema  Left lower leg: No edema  Skin:     General: Skin is warm and dry  Coloration: Skin is not jaundiced or pale  Findings: No bruising, lesion or rash  Neurological:      Mental Status: He is alert and oriented to person, place, and time  Additional Data:     Labs:  Results from last 7 days   Lab Units 09/27/21  1244 09/27/21  1240 09/24/21  0303 09/23/21  0219 09/22/21  1716   WBC Thousand/uL  --   --  7 41  --  7 47   HEMOGLOBIN g/dL  --  13 0 11 9*  --  13 1   I STAT HEMOGLOBIN g/dl 11 9*  --   --    < >  --    HEMATOCRIT %  --  39 9 36 5  --  39 4   HEMATOCRIT, ISTAT % 35*  --   --    < >  --    PLATELETS Thousands/uL  --  198 262  --  287   NEUTROS PCT %  --   --   --   --  66   LYMPHS PCT %  --   --   --   --  21   MONOS PCT %  --   --   --   --  10   EOS PCT %  --   --   --   --  2    < > = values in this interval not displayed       Results from last 7 days   Lab Units 09/27/21  1244 09/27/21  1240 09/24/21  0303 09/23/21  0219   SODIUM mmol/L  --  140  --  143   POTASSIUM mmol/L  --  4 0  --  3 6   CHLORIDE mmol/L  --  113*  --  107 CO2 mmol/L  --  23  --  25   CO2, I-STAT mmol/L 23  --    < >  --    BUN mg/dL  --  18  --  21   CREATININE mg/dL  --  0 82  --  1 09   ANION GAP mmol/L  --  4  --  11   CALCIUM mg/dL  --  8 0*  --  8 8   ALBUMIN g/dL  --   --   --  3 5   TOTAL BILIRUBIN mg/dL  --   --   --  0 57   ALK PHOS U/L  --   --   --  74   ALT U/L  --   --   --  27   AST U/L  --   --   --  20   GLUCOSE RANDOM mg/dL  --  137  --  91    < > = values in this interval not displayed  Results from last 7 days   Lab Units 09/27/21  1240   INR  1 26*     Results from last 7 days   Lab Units 09/27/21  1422 09/27/21  1240 09/27/21  0530 09/26/21  2058 09/26/21  1611 09/26/21  1109 09/26/21  0601 09/25/21  2053 09/25/21  1601 09/25/21  1043 09/25/21  0622 09/24/21  2106   POC GLUCOSE mg/dl 120 119 104 121 127 145* 96 156* 113 167* 107 100     Results from last 7 days   Lab Units 09/25/21  0103   HEMOGLOBIN A1C % 6 3*           Lines/Drains:  Invasive Devices     Central Venous Catheter Line            CVC Central Lines 09/27/21 Triple <1 day    Introducer 09/27/21 <1 day          Peripheral Intravenous Line            Peripheral IV 09/24/21 Right Forearm 3 days    Peripheral IV 09/27/21 Right Antecubital <1 day          Arterial Line            Arterial Line 09/27/21 Left Radial <1 day          Line            Pacer Wires <1 day    Pacer Wires <1 day          Drain            Chest Tube 1 Left Pleural 32 Fr  <1 day    Chest Tube 2 Posterior Mediastinal 32 Fr  <1 day    Chest Tube 3 Anterior Mediastinal 32 Fr  <1 day    NG/OG/Enteral Tube Orogastric 18 Fr Center mouth <1 day    Urethral Catheter Non-latex;Straight-tip; Temperature probe 16 Fr  <1 day          Airway            ETT  Hi-Lo; Cuffed;Oral 8 mm <1 day              Urinary Catheter:  Goal for removal: Remove after 48 hrs of I/O monitoring         Central Line:  Goal for removal: heparin drip             Imaging: No pertinent imaging reviewed      Recent Cultures (last 7 days):         Last 24 Hours Medication List:   Current Facility-Administered Medications   Medication Dose Route Frequency Provider Last Rate   • acetaminophen  650 mg Rectal Q4H PRN Fiordaliza Marshall PA-C     • acetaminophen  975 mg Oral Q8H Fiordaliza Marshall PA-C     • albumin human          • albumin human  12 5 g Intravenous Once Letha Sultana PA-C     • amiodarone  200 mg Oral [facility] Fiordaliza Marshall PA-C     • aspirin  325 mg Oral Daily Fiordaliza Marshall PA-C     • atorvastatin  80 mg Oral Daily With ANNE Bryant     • bisacodyl  10 mg Rectal Daily PRN Fiordaliza Marshall PA-C     • calcium chloride  1 g Intravenous Once Fiordaliza Marshall PA-C     • cefazolin  2,000 mg Intravenous Q8H Fiordaliza Marshall PA-C     • chlorhexidine  15 mL Mouth/Throat BID Fiordaliza Marshall PA-C     • cholecalciferol  1,000 Units Oral Daily Fiordaliza Marshall PA-C     • fentanyl citrate (PF)  50 mcg Intravenous Once Fiordaliza Marshall PA-C     • fentanyl citrate (PF)  50 mcg Intravenous Q1H PRN Fiordaliza Marshall PA-C     • [START ON 9/28/2021] fondaparinux  2 5 mg Subcutaneous Daily Fiordaliza Marshall PA-C     • furosemide  40 mg Intravenous Q6H PRN Fiordaliza Marshall PA-C     • HYDROmorphone  0 5 mg Intravenous Q2H PRN Fiordaliza Marshall PA-C     • insulin regular (HumuLIN R,NovoLIN R) infusion  0 3-21 Units/hr Intravenous Titrated Fiordaliza Marshall PA-C 1 5 Units/hr (09/27/21 1245)   • lactated ringers  500 mL Intravenous Q30 Min PRN Fiordaliza Marshall PA-C     • lidocaine (cardiac)  100 mg Intravenous Q30 Min PRN Fiordaliza Marshall PA-C     • magnesium sulfate  2 g Intravenous Once Fiordaliza Marshall PA-C     • mupirocin  1 application Nasal B5P [facility] Fiordaliza Marshall PA-C     • niCARdipine  2 5-15 mg/hr Intravenous Titrated Fiordaliza Marshall PA-C     • ondansetron  4 mg Intravenous Q6H PRN Fiordaliza Marshall PA-C     • oxyCODONE  2 5 mg Oral Q4H PRN Fiordaliza Marshall PA-C     • oxyCODONE  5 mg Oral Q4H PRN Fiordaliza Marshall PA-C     • pantoprazole  40 mg Oral Daily Fiordaliza Marshall PA-C     • phenylephine   mcg/min Intravenous Titrated Danielle Macadamia, PA-C 40 mcg/min (09/27/21 1419)   • [START ON 9/28/2021] polyethylene glycol  17 g Oral Daily Danielle Macaanderia, PA-C     • potassium chloride  20 mEq Intravenous Once PRN Danielle Macadamia, PA-C     • potassium chloride  20 mEq Intravenous Q1H PRN Danielle Macadamia, PA-C     • potassium chloride  20 mEq Intravenous Q30 Min PRN Danielle Macadamia, PA-C     • sodium chloride  20 mL/hr Intravenous Continuous Danielle Macadamia, PA-C 20 mL/hr (09/27/21 1245)        Today, Patient Was Seen By: Simran Concepcion DO    **Please Note: This note may have been constructed using a voice recognition system  **

## 2021-09-27 NOTE — RESPIRATORY THERAPY NOTE
RT Ventilator Management Note  Bernie Olivares 62 y o  male MRN: [id number]  Unit/Bed#: [facility] 414-01 Encounter: [id number]      Daily Screen       9/27/2021  1233 9/27/2021  1312          Patient safety screen outcome[de-identified]  Passed  --      Spont breathing trial % for 30 min:  --  Yes      Spont breathing trial outcome[de-identified]  --  Passed              Physical Exam:   Assessment Type: Assess only  General Appearance: Sedated  Respiratory Pattern: Assisted  Chest Assessment: Chest expansion symmetrical  Bilateral Breath Sounds: Diminished  O2 Device: NC      Resp Comments: Pt extubated at this time post SBT  Pt is able to vocalize, no stridor or resp distress   Pt placed on 4L NC  Home

## 2021-09-27 NOTE — ANESTHESIA PROCEDURE NOTES
Central Line Insertion  Performed by: Bassem Mayfield MD  Authorized by: Bassem Mayfield MD     Date/Time: 9/27/2021 8:23 AM  Catheter Type:  triple lumen  Consent: Written consent obtained  Risks and benefits: risks, benefits and alternatives were discussed  Consent given by: patient  Patient understanding: patient states understanding of the procedure being performed  Patient consent: the patient's understanding of the procedure matches consent given  Procedure consent: procedure consent matches procedure scheduled  Required items: required blood products, implants, devices, and special equipment available  Patient identity confirmed: arm band  Time out: Immediately prior to procedure a "time out" was called to verify the correct patient, procedure, equipment, support staff and site/side marked as required    Indications: central pressure monitoring and vascular access  Location details: right internal jugular  Patient position: Trendelenburg    Sedation:  Patient sedated: yes    Assessment: blood return through all ports and free fluid flow  Preparation: skin prepped with ChloraPrep  Skin prep agent dried: skin prep agent completely dried prior to procedure  Sterile barriers: all five maximum sterile barriers used - cap, mask, sterile gown, sterile gloves, and large sterile sheet  Hand hygiene: hand hygiene performed prior to central venous catheter insertion  Ultrasound guidance: yes  sterile gel and probe cover used in ultrasound-guided central venous catheter insertionultrasound permanent image saved  Pre-procedure: landmarks identified  Number of attempts: 1  Successful placement: yes  Post-procedure: chlorhexidine patch applied,  dressing applied and line sutured  Patient tolerance: patient tolerated the procedure well with no immediate complications  Comments: Procedure start 0808  Procedure end [id number]

## 2021-09-27 NOTE — ANESTHESIA PREPROCEDURE EVALUATION
Procedure:  CORONARY ARTERY BYPASS GRAFT (CABG) 2 VESSELS, EVH (N/A Chest)  TRANSESOPHAGEAL ECHOCARDIOGRAM (JAE) (N/A Esophagus)    Relevant Problems   CARDIO   (+) CAD (coronary artery disease)   (+) Essential hypertension   (+) Hyperlipidemia      ENDO   (+) Type 2 diabetes mellitus with diabetic polyneuropathy, without long-term current use of insulin (HCC)      HEMATOLOGY   (+) Anemia        Physical Exam    Airway    Mallampati score: II         Dental   No notable dental hx     Cardiovascular      Pulmonary      Other Findings        Anesthesia Plan  ASA Score- 4     Anesthesia Type- general with ASA Monitors  Additional Monitors: arterial line, central venous line and pulmonary artery catheter  Airway Plan: ETT  Comment: I, Dr Lorene Baker, the attending physician, have personally seen and evaluated the patient prior to anesthetic care  I have reviewed the pre-anesthetic record, and other medical records if appropriate to the anesthetic care  If a CRNA is involved in the case, I have reviewed the CRNA assessment, if present, and agree  The patient is in a suitable condition to proceed with my formulated anesthetic plan          Plan Factors-    Chart reviewed  Induction- intravenous  Postoperative Plan-     Informed Consent- Anesthetic plan and risks discussed with patient  I personally reviewed this patient with the CRNA  Discussed and agreed on the Anesthesia Plan with the CRNA  Tiesha Leyva

## 2021-09-27 NOTE — INTERVAL H&P NOTE
Vitals:    09/27/21 0412   BP: 164/88   Pulse: 60   Resp:    Temp: 98 °F (36 7 °C)   SpO2: 100%         H&P reviewed  After examining the patient I find no changes in the patients condition since the H&P was completed  Plan for CABG  Preoperative Beta Blocker: indicated  Anticipated Length of Stay: Patient will be admitted on an inpatient basis with an anticipated length of stay of grater than 2 midnights  Justification for Hospital StaY: Post surgical recovery following open heart surgery        Morgan Johnson MD  09/27/21  7:28 AM

## 2021-09-27 NOTE — ASSESSMENT & PLAN NOTE
Lab Results   Component Value Date    HGBA1C 6 3 (H) 09/25/2021       Recent Labs     09/26/21  2058 09/27/21  0530 09/27/21  1240 09/27/21  1422   POCGLU 121 104 119 120       Blood Sugar Average: Last 72 hrs:  (P) 595 [id number] currently on 20 units    Restart his home dose of 36 units of glargine at discharge  Sliding scale  Avoid hypoglycemia

## 2021-09-27 NOTE — CONSULTS
[address] [facility] [place] 62 y o  male MRN: [id number]  Unit/Bed#: [facility] 414-01 Encounter: [id number]    Inpatient consult to Helga Paez performed by: Elisha Spangler PA-C  Consult ordered by: Nikko Garcia PA-C        Physician Requesting Consult: Guerda Blair MD    Reason for Consult / Principal Problem: ACS (acute coronary syndrome) [facility])    HPI: Rosa Gerber is a 26-year-old male who over the last 3 months has been having increased fatigability, shortness of breath, and malaise  He called his PCP who advised him to present to the emergency department  Patient was diagnosed with an NSTEMI and underwent cardiac catheterization  This revealed multivessel CAD and the patient was transferred to [facility] for cardiac surgery consultation  Patient was seen in consultation by Dr Lucy Núñez recommended proceeding with surgical revascularization the patient presents in ICU postoperatively today  PMH:  Coronary disease, hypertension, hyperlipidemia, obesity, diabetes type 2, ischemic cardiomyopathy with an EF of 40%, vitamin-D deficiency, obesity, right toe amputation  History obtained from chart review due to patient being intubated and sedated  ---------------------------------------------------------------------------------------------------------------------------------------------------------------------  Impressions:  1  CAD s/p CABG x2  2  HTn  3  HLD  4  DM2  5  Obesity  6  ICM EF 45%  7  Obesity    Plan:    Neuro: D/C continuous sedation  Tylenol ATC and oxycodone PRN for pain  Trend neuro exam   Delirium precautions  CV: MAP goal >65  SBP goal <130  CI>2 2  Post-op medications: Neosynephrine, 30 mcg/min  Wean to off as able  Volume resuscitation as needed  Monitor rhythm on telemetry  Epicardial pacing wires in place  Intra-op JAE LVEF 45%  Lung: Check STAT post-op ABG and CXR   Wean vent with spontaneous breathing trial with goal to extubate today  GI: GI prophylaxis with PPI  Bowel regimen  Zofran PRN for nausea  FEN: NPO  Replenish K >4 0, mag >2 0 and calcium >7 0  : Check STAT post-op BMP  Almanza in place  Monitor UOP with goal >0 5cc/kg/hour  Lasix versus volume resuscitate as needed depending on hemodynamics and volume status  ID: Prophylactic post-op abx  Maintain normothermia  Trend temps  Heme: Check STAT post-op H/H and platelets  Monitor incision site, invasive lines, and chest tube outputs for bleeding  Send coag panel if needed  Endo: Insulin gtt for blood sugar control  Results from last 6 Months   Lab Units 09/25/21  0103 07/20/21  1223   HEMOGLOBIN A1C % 6 3* 6 9*     Disposition: ICU Care   ---------------------------------------------------------------------------------------------------------------------------------------------------------------------  Historical Information   Past Medical History:   Diagnosis Date   • Diabetes mellitus ([facility] [address] )    • Hypertension      Past Surgical History:   Procedure Laterality Date   • CARDIAC CATHETERIZATION  9/23/2021   • INCISION AND DRAINAGE OF WOUND Right 9/8/2019    Procedure: INCISION AND DRAINAGE (I&D) EXTREMITY;  Surgeon: Katty Dominguez DPM;  Location: [facility] OR;  Service: Podiatry     Social History   Social History     Substance and Sexual Activity   Alcohol Use Yes   • Alcohol/week: 30 0 standard drinks   • Types: 30 Cans of beer per week    Comment: 5-6 beers 4-5 x per week      Social History     Substance and Sexual Activity   Drug Use Never     Social History     Tobacco Use   Smoking Status Never Smoker   Smokeless Tobacco Never Used     No family history on file  I have reviewed this patient's family history and commented on sigificant items within the HPI    ROS: ROS unable to be obtained due to patient being intubated and sedated       Allergies: No Known Allergies    Home Medications:   Prior to Admission medications    Medication Sig Start Date End Date Taking?  Authorizing Provider   cholecalciferol (VITAMIN D3) 1,000 units tablet Take 5,000 Units by mouth daily    Historical Provider, MD   LANALEJO SOLOSTAR 100 units/mL injection pen 36 Units daily at bedtime  3/7/20   Historical Provider, MD   losartan-hydrochlorothiazide (HYZAAR) 100-25 MG per tablet Take 1 tablet by mouth daily     Historical Provider, MD   metFORMIN (GLUCOPHAGE) 1000 MG tablet Take 1,000 mg by mouth 2 (two) times a day with meals    Historical Provider, MD   rosuvastatin (CRESTOR) 5 mg tablet Take 5 mg by mouth MWF three times a week    Historical Provider, MD     Inpatient Medications:  Scheduled Meds:  Current Facility-Administered Medications   Medication Dose Route Frequency Provider Last Rate   • acetaminophen  650 mg Rectal Q4H PRN Danielle Mills PA-C     • acetaminophen  975 mg Oral Q8H Danielle Mills PA-C     • amiodarone  200 mg Oral [facility] Danielle Mills [place]     • aspirin  325 mg Oral Daily Danielle Mills PA-C     • atorvastatin  80 mg Oral Daily With ANNE Bryant     • bisacodyl  10 mg Rectal Daily PRN Danielle Mills PA-C     • calcium chloride  1 g Intravenous Once Danielle Mills PA-C     • cefazolin  2,000 mg Intravenous Q8H Danielle Mills PA-C     • chlorhexidine  15 mL Mouth/Throat BID Danielle Mills PA-C     • cholecalciferol  1,000 Units Oral Daily Danielle Mills PA-C     • fentanyl citrate (PF)  50 mcg Intravenous Once Danielle Mills PA-C     • fentanyl citrate (PF)  50 mcg Intravenous Q1H PRN Danielle Mills PA-C     • [START ON 9/28/2021] fondaparinux  2 5 mg Subcutaneous Daily Danielle Mills PA-C     • furosemide  40 mg Intravenous Q6H PRN Danielle Mills PA-C     • HYDROmorphone  0 5 mg Intravenous Q2H PRN Danielle Mills PA-C     • insulin regular (HumuLIN R,NovoLIN R) infusion  0 3-21 Units/hr Intravenous Titrated Danielle Mills PA-C 1 5 Units/hr (09/27/21 1245)   • lactated ringers  500 mL Intravenous Q30 Min PRN Danielle Mills PA-C     • lidocaine (cardiac)  100 mg Intravenous Q30 Min PRN Mellissa Lemons, PA-C     • magnesium sulfate  2 g Intravenous Once Mellissa Lemons, PA-C     • mupirocin  1 application Nasal H[address]L [address] Mellissa Lemons, PA-C     • niCARdipine  2 5-15 mg/hr Intravenous Titrated Mellissa Bennettenin, PA-C     • ondansetron  4 mg Intravenous Q6H PRN Mellissa Bennettenin, PA-C     • oxyCODONE  2 5 mg Oral Q4H PRN Mellissa Bennettenin, PA-C     • oxyCODONE  5 mg Oral Q4H PRN Ragishmael Bennettenin, PA-C     • pantoprazole  40 mg Oral Daily Mellissa Lemons, PA-C     • phenylephine   mcg/min Intravenous Titrated Mellissa Lemons, PA-C 20 mcg/min (09/27/21 1325)   • [START ON 9/28/2021] polyethylene glycol  17 g Oral Daily Mellissa Lemons, PA-C     • potassium chloride  20 mEq Intravenous Once PRN Mellissa Benntetenin, PA-C     • potassium chloride  20 mEq Intravenous Q1H PRN Mellissa Bennettenin, PA-C     • potassium chloride  20 mEq Intravenous Q30 Min PRN Mellissa Lemons, PA-C     • sodium chloride  20 mL/hr Intravenous Continuous Mellissa Lemons, PA-C 20 mL/hr (09/27/21 1245)     Continuous Infusions:insulin regular (HumuLIN R,NovoLIN R) infusion, 0 3-21 Units/hr, Last Rate: 1 5 Units/hr (09/27/21 1245)  niCARdipine, 2 5-15 mg/hr  phenylephine,  mcg/min, Last Rate: 20 mcg/min (09/27/21 1325)  sodium chloride, 20 mL/hr, Last Rate: 20 mL/hr (09/27/21 1245)      PRN Meds:  acetaminophen, 650 mg, Q4H PRN  bisacodyl, 10 mg, Daily PRN  fentanyl citrate (PF), 50 mcg, Q1H PRN  furosemide, 40 mg, Q6H PRN  HYDROmorphone, 0 5 mg, Q2H PRN  lactated ringers, 500 mL, Q30 Min PRN  lidocaine (cardiac), 100 mg, Q30 Min PRN  ondansetron, 4 mg, Q6H PRN  oxyCODONE, 2 5 mg, Q4H PRN  oxyCODONE, 5 mg, Q4H PRN  potassium chloride, 20 mEq, Once PRN  potassium chloride, 20 mEq, Q1H PRN  potassium chloride, 20 mEq, Q30 Min PRN      ---------------------------------------------------------------------------------------------------------------------------------------------------------------------  Vitals:   Vitals:    09/26/21 2000 21 2223 21 0412 21 0609   BP:  147/79 164/88    Pulse:  61 60    Resp:       Temp:  98 1 °F (36 7 °C) 98 °F (36 7 °C)    TempSrc:       SpO2: 97% 95% 100%    Weight:    92 4 kg (203 lb 9 6 oz)   Height:         Arterial Line:          Temperature: Temp (24hrs), Av 1 °F (36 7 °C), Min:97 8 °F (36 6 °C), Max:98 6 °F (37 °C)  Current: Temperature: 98 °F (36 7 °C)    Weights: IBW (Ideal Body Weight): 66 1 kg  Body mass index is 31 89 kg/m²  Hemodynamic Monitoring:  PAP:  , CVP:  , CO:  , CI:  , SVR:      Ventilator Settings:  Respiratory    Lab Data (Last 4 hours)    None         O2/Vent Data (Last 4 hours)       1233          Patient safety screen outcome: Passed                 Labs:   Results from last 7 days   Lab Units 21  1244 21  1145 21  1104 21  0303 21  0219 21  0219 21  1716   WBC Thousand/uL  --   --   --  7 41  --  7 03 7 47   HEMOGLOBIN g/dL  --   --   --  11 9*  --  11 9* 13 1   I STAT HEMOGLOBIN g/dl 11 9* 10 5* 8 8*  --    < >  --   --    HEMATOCRIT %  --   --   --  36 5  --  35 9* 39 4   HEMATOCRIT, ISTAT % 35* 31* 26*  --    < >  --   --    PLATELETS Thousands/uL  --   --   --  262  --  250 287   NEUTROS PCT %  --   --   --   --   --   --  66   MONOS PCT %  --   --   --   --   --   --  10    < > = values in this interval not displayed       Results from last 7 days   Lab Units 21  1244 21  1240 21  1145 21  1104 21  0555 21  0103 21  0103 21  0219 21  1716   SODIUM mmol/L  --  140  --   --  140  --  141 143 140   POTASSIUM mmol/L  --  4 0  --   --  4 3  --  4 0 3 6 4 3   CHLORIDE mmol/L  --  113*  --   --  110*  --  109* 107 105   CO2 mmol/L  --  23  --   --  29  --  30 25 25   CO2, I-STAT mmol/L 23  --  23 30  --    < >  --   --   --    BUN mg/dL  --  18  --   --  19  --  19 21 20   CREATININE mg/dL  --  0 82  --   --  1 00  --  0 97 1 09 1 00   CALCIUM mg/dL  --  8 0*  --   -- 8 8  --  8 7 8 8 9 0   ALK PHOS U/L  --   --   --   --   --   --   --  74 84   ALT U/L  --   --   --   --   --   --   --  27 30   AST U/L  --   --   --   --   --   --   --  20 25   GLUCOSE, ISTAT mg/dl 139  --  150* 149*  --    < >  --   --   --     < > = values in this interval not displayed  Post-op AB 37/37/91/21/-3/97%  Post-op CXR: Lines and tubes in appropriate position  No pTX  I have personally reviewed pertinent films in PACS  Post-op EKG: NSR  Anterior ST-T wave changes  This was personally reviewed by myself  Physical Exam  Vitals reviewed  Constitutional:       Interventions: He is sedated and intubated  Neck:      Comments: [facility] CVC  Cardiovascular:      Rate and Rhythm: Normal rate and regular rhythm  Heart sounds: Friction rub present  Pulmonary:      Effort: Pulmonary effort is normal  He is intubated  Breath sounds: Normal breath sounds  Genitourinary:     Comments: +Almanza  Skin:     General: Skin is warm and dry  Neurological:      Comments: Sedated   Psychiatric:      Comments: Cannot assess       Invasive lines and devices: Invasive Devices     Central Venous Catheter Line            CVC Central Lines 21 Triple <1 day    Introducer 21 <1 day          Peripheral Intravenous Line            Peripheral IV 21 Right Forearm 3 days    Peripheral IV 21 Right Antecubital <1 day          Arterial Line            Arterial Line 21 Left Radial <1 day          Line            Pacer Wires <1 day    Pacer Wires <1 day          Drain            Chest Tube 1 Left Pleural 32 Fr  <1 day    Chest Tube 2 Posterior Mediastinal 32 Fr  <1 day    Chest Tube 3 Anterior Mediastinal 32 Fr  <1 day    NG/OG/Enteral Tube Orogastric 18 Fr Center mouth <1 day    Urethral Catheter Non-latex;Straight-tip; Temperature probe 16 Fr  <1 day          Airway            ETT  Hi-Lo; Cuffed;Oral 8 mm <1 day ---------------------------------------------------------------------------------------------------------------------------------------------------------------------  Care Time Delivered:   No Critical Care time spent     SIGNATURE: Keya Harmon PA-C  DATE: September 27, 2021  TIME: 1:35 PM

## 2021-09-27 NOTE — ANESTHESIA PROCEDURE NOTES
Procedure Performed: JAE Anesthesia  Start Time:  9/27/2021 8:25 AM        Preanesthesia Checklist    Patient identified, IV assessed, risks and benefits discussed, monitors and equipment assessed, procedure being performed at surgeon's request and anesthesia consent obtained  Procedure    Diagnostic Indications for JAE:  assessment of ascending aorta, assessment of surgical repair and hemodynamic monitoring  Type of JAE: complete JAE with interpretation  Images Saved: ultrasound permanent image saved  Physician Requesting Echo: Segundo Cheng MD  Location performed: OR  Intubated  Bite block not placed  Heart visualized  Insertion of JAE Probe:  Atraumatic  Probe Type:  Multiplane  Modalities:  2D only, color flow mapping, continuous wave Doppler and pulse wave Doppler  Echocardiographic and Doppler Measurements    PREPROCEDURE    LEFT VENTRICLE:  Systolic Function: mildly impaired  Ejection Fraction: 45%  Cavity size: dilated  Regional Wall Motion Abnormalities: akinesis of mid and apical anterior walls  RIGHT VENTRICLE:  Systolic Function: normal   Cavity size moderately dilated  No hypertrophy              AORTIC VALVE:  Leaflets: trileaflet  Leaflet motions restricted  Stenosis: mild  Mean Gradient: 4 mmHg  Regurgitation: none  MITRAL VALVE:  Leaflets: normal  Leaflet Motions: normal  Regurgitation: trace  Stenosis: none  TRICUSPID VALVE:  Leaflets: normal  Leaflet Motions: normal  Stenosis: none  Regurgitation: mild  PULMONIC VALVE:  Leaflets: normal  Regurgitation: none  Stenosis: none  ASCENDING AORTA:  Size:  normal  Dissection not present  AORTIC ARCH:  Size:  normal  dissection not present  Grade 3: atheroma protruding < 0 5 cm into lumen  DESCENDING AORTA:  Size: normal  Dissection not present  Grade 3: atheroma protruding < 0 5 cm into lumen  RIGHT ATRIUM:  Size:  normal      LEFT ATRIUM:  Size: dilated  LEFT ATRIAL APPENDAGE:  Size: dilated  No spontaneous echo contrast         ATRIAL SEPTUM:  Intra-atrial septal morphology: normal          VENTRICULAR SEPTUM:  Intra-ventricular septum morphology: normal          EPIAORTIC:  Plaque Thickness: > 5 mm  OTHER FINDINGS:  Pericardium:  normal  Pleural Effusion:  none  POSTPROCEDURE    LEFT VENTRICLE: Unchanged   RIGHT VENTRICLE: Unchanged   AORTIC VALVE: Unchanged   MITRAL VALVE: Unchanged   TRICUSPID VALVE: Unchanged   PULMONIC VALVE: Unchanged           ATRIA: Unchanged   AORTA: Unchanged   REMOVAL:  Probe Removal: atraumatic

## 2021-09-27 NOTE — ANESTHESIA PROCEDURE NOTES
Arterial Line Insertion  Performed by: Gabriela Medeiros MD  Authorized by: Gabriela Medeiros MD   Consent: Written consent obtained  Risks and benefits: risks, benefits and alternatives were discussed  Consent given by: patient  Patient understanding: patient states understanding of the procedure being performed  Patient consent: the patient's understanding of the procedure matches consent given  Procedure consent: procedure consent matches procedure scheduled  Required items: required blood products, implants, devices, and special equipment available  Patient identity confirmed: arm band  Time out: Immediately prior to procedure a "time out" was called to verify the correct patient, procedure, equipment, support staff and site/side marked as required  Preparation: Patient was prepped and draped in the usual sterile fashion    Indications: multiple ABGs and hemodynamic monitoring  Orientation:  Left  Location: radial artery  Sedation:  Patient sedated: no    Procedure Details:  Needle gauge: 20  Number of attempts: 2    Post-procedure:  Post-procedure: dressing applied  Waveform: good waveform and waveform confirmed  Post-procedure CNS: normal  Patient tolerance: patient tolerated the procedure well with no immediate complications  Comments: Procedure start 7802  Procedure end 6949

## 2021-09-27 NOTE — ANESTHESIA POSTPROCEDURE EVALUATION
Post-Op Assessment Note    CV Status:  Stable    Pain management: adequate     Mental Status:  Unresponsive   PONV Controlled:  None   Airway Patency:  Patent  Airway: intubated      Post Op Vitals Reviewed: Yes      Staff: Anesthesiologist         No complications documented      BP      Temp      Pulse     Resp      SpO2

## 2021-09-28 ENCOUNTER — APPOINTMENT (INPATIENT)
Dept: RADIOLOGY | Facility: HOSPITAL | Age: 61
DRG: 235 | End: 2021-09-28
Payer: COMMERCIAL

## 2021-09-28 LAB
ABO GROUP BLD BPU: NORMAL
ANION GAP SERPL CALCULATED.3IONS-SCNC: 7 MMOL/L (ref 4–13)
ATRIAL RATE: 80 BPM
BPU ID: NORMAL
BUN SERPL-MCNC: 23 MG/DL (ref 5–25)
CALCIUM SERPL-MCNC: 7.8 MG/DL (ref 8.3–10.1)
CHLORIDE SERPL-SCNC: 110 MMOL/L (ref 100–108)
CO2 SERPL-SCNC: 21 MMOL/L (ref 21–32)
CREAT SERPL-MCNC: 0.98 MG/DL (ref 0.6–1.3)
CROSSMATCH: NORMAL
ERYTHROCYTE [DISTWIDTH] IN BLOOD BY AUTOMATED COUNT: 13.5 % (ref 11.6–15.1)
GFR SERPL CREATININE-BSD FRML MDRD: 83 ML/MIN/1.73SQ M
GLUCOSE SERPL-MCNC: 102 MG/DL (ref 65–140)
GLUCOSE SERPL-MCNC: 103 MG/DL (ref 65–140)
GLUCOSE SERPL-MCNC: 105 MG/DL (ref 65–140)
GLUCOSE SERPL-MCNC: 105 MG/DL (ref 65–140)
GLUCOSE SERPL-MCNC: 117 MG/DL (ref 65–140)
GLUCOSE SERPL-MCNC: 165 MG/DL (ref 65–140)
GLUCOSE SERPL-MCNC: 168 MG/DL (ref 65–140)
GLUCOSE SERPL-MCNC: 171 MG/DL (ref 65–140)
GLUCOSE SERPL-MCNC: 188 MG/DL (ref 65–140)
GLUCOSE SERPL-MCNC: 226 MG/DL (ref 65–140)
GLUCOSE SERPL-MCNC: 228 MG/DL (ref 65–140)
GLUCOSE SERPL-MCNC: 242 MG/DL (ref 65–140)
HCT VFR BLD AUTO: 39.1 % (ref 36.5–49.3)
HGB BLD-MCNC: 12.6 G/DL (ref 12–17)
MAGNESIUM SERPL-MCNC: 2.9 MG/DL (ref 1.6–2.6)
MCH RBC QN AUTO: 29.3 PG (ref 26.8–34.3)
MCHC RBC AUTO-ENTMCNC: 32.2 G/DL (ref 31.4–37.4)
MCV RBC AUTO: 91 FL (ref 82–98)
P AXIS: 58 DEGREES
PLATELET # BLD AUTO: 258 THOUSANDS/UL (ref 149–390)
PMV BLD AUTO: 10.3 FL (ref 8.9–12.7)
POTASSIUM SERPL-SCNC: 4 MMOL/L (ref 3.5–5.3)
PR INTERVAL: 100 MS
QRS AXIS: -15 DEGREES
QRSD INTERVAL: 76 MS
QT INTERVAL: 390 MS
QTC INTERVAL: 449 MS
RBC # BLD AUTO: 4.3 MILLION/UL (ref 3.88–5.62)
SODIUM SERPL-SCNC: 138 MMOL/L (ref 136–145)
T WAVE AXIS: 129 DEGREES
UNIT DISPENSE STATUS: NORMAL
UNIT PRODUCT CODE: NORMAL
UNIT PRODUCT VOLUME: 350 ML
UNIT RH: NORMAL
VENTRICULAR RATE: 80 BPM
WBC # BLD AUTO: 20.26 THOUSAND/UL (ref 4.31–10.16)

## 2021-09-28 PROCEDURE — 97163 PT EVAL HIGH COMPLEX 45 MIN: CPT

## 2021-09-28 PROCEDURE — 97110 THERAPEUTIC EXERCISES: CPT

## 2021-09-28 PROCEDURE — 99233 SBSQ HOSP IP/OBS HIGH 50: CPT | Performed by: STUDENT IN AN ORGANIZED HEALTH CARE EDUCATION/TRAINING PROGRAM

## 2021-09-28 PROCEDURE — 93010 ELECTROCARDIOGRAM REPORT: CPT | Performed by: INTERNAL MEDICINE

## 2021-09-28 PROCEDURE — 83735 ASSAY OF MAGNESIUM: CPT | Performed by: THORACIC SURGERY (CARDIOTHORACIC VASCULAR SURGERY)

## 2021-09-28 PROCEDURE — 80048 BASIC METABOLIC PNL TOTAL CA: CPT | Performed by: PHYSICIAN ASSISTANT

## 2021-09-28 PROCEDURE — 97167 OT EVAL HIGH COMPLEX 60 MIN: CPT

## 2021-09-28 PROCEDURE — 82948 REAGENT STRIP/BLOOD GLUCOSE: CPT

## 2021-09-28 PROCEDURE — 93005 ELECTROCARDIOGRAM TRACING: CPT

## 2021-09-28 PROCEDURE — 85027 COMPLETE CBC AUTOMATED: CPT | Performed by: THORACIC SURGERY (CARDIOTHORACIC VASCULAR SURGERY)

## 2021-09-28 PROCEDURE — 99222 1ST HOSP IP/OBS MODERATE 55: CPT | Performed by: INTERNAL MEDICINE

## 2021-09-28 PROCEDURE — 99024 POSTOP FOLLOW-UP VISIT: CPT | Performed by: THORACIC SURGERY (CARDIOTHORACIC VASCULAR SURGERY)

## 2021-09-28 PROCEDURE — 71045 X-RAY EXAM CHEST 1 VIEW: CPT

## 2021-09-28 RX ORDER — POTASSIUM CHLORIDE 20 MEQ/1
20 TABLET, EXTENDED RELEASE ORAL DAILY
Status: DISCONTINUED | OUTPATIENT
Start: 2021-09-28 | End: 2021-09-29

## 2021-09-28 RX ORDER — HYDROMORPHONE HCL/PF 1 MG/ML
0.5 SYRINGE (ML) INJECTION EVERY 2 HOUR PRN
Status: DISCONTINUED | OUTPATIENT
Start: 2021-09-28 | End: 2021-09-28

## 2021-09-28 RX ORDER — FUROSEMIDE 10 MG/ML
40 INJECTION INTRAMUSCULAR; INTRAVENOUS ONCE
Status: COMPLETED | OUTPATIENT
Start: 2021-09-28 | End: 2021-09-28

## 2021-09-28 RX ORDER — DOCUSATE SODIUM 100 MG/1
100 CAPSULE, LIQUID FILLED ORAL 2 TIMES DAILY
Status: DISCONTINUED | OUTPATIENT
Start: 2021-09-28 | End: 2021-09-30 | Stop reason: HOSPADM

## 2021-09-28 RX ORDER — INSULIN GLARGINE 100 [IU]/ML
30 INJECTION, SOLUTION SUBCUTANEOUS
Status: DISCONTINUED | OUTPATIENT
Start: 2021-09-28 | End: 2021-09-30 | Stop reason: HOSPADM

## 2021-09-28 RX ORDER — FUROSEMIDE 10 MG/ML
40 INJECTION INTRAMUSCULAR; INTRAVENOUS DAILY
Status: DISCONTINUED | OUTPATIENT
Start: 2021-09-28 | End: 2021-09-29

## 2021-09-28 RX ORDER — TEMAZEPAM 15 MG/1
15 CAPSULE ORAL
Status: DISCONTINUED | OUTPATIENT
Start: 2021-09-28 | End: 2021-09-30 | Stop reason: HOSPADM

## 2021-09-28 RX ADMIN — AMIODARONE HYDROCHLORIDE 200 MG: 200 TABLET ORAL at 06:08

## 2021-09-28 RX ADMIN — Medication 12.5 MG: at 08:38

## 2021-09-28 RX ADMIN — OXYCODONE HYDROCHLORIDE 5 MG: 5 TABLET ORAL at 13:39

## 2021-09-28 RX ADMIN — POLYETHYLENE GLYCOL 3350 17 G: 17 POWDER, FOR SOLUTION ORAL at 08:38

## 2021-09-28 RX ADMIN — CHLORHEXIDINE GLUCONATE 15 ML: 1.2 SOLUTION ORAL at 08:37

## 2021-09-28 RX ADMIN — OXYCODONE HYDROCHLORIDE 5 MG: 5 TABLET ORAL at 22:08

## 2021-09-28 RX ADMIN — DOCUSATE SODIUM 100 MG: 100 CAPSULE ORAL at 08:38

## 2021-09-28 RX ADMIN — POTASSIUM CHLORIDE 20 MEQ: 1500 TABLET, EXTENDED RELEASE ORAL at 08:39

## 2021-09-28 RX ADMIN — FUROSEMIDE 40 MG: 10 INJECTION, SOLUTION INTRAMUSCULAR; INTRAVENOUS at 08:37

## 2021-09-28 RX ADMIN — DOCUSATE SODIUM 100 MG: 100 CAPSULE ORAL at 17:58

## 2021-09-28 RX ADMIN — PHENYLEPHRINE HYDROCHLORIDE 40 MCG/MIN: 10 INJECTION INTRAVENOUS at 02:19

## 2021-09-28 RX ADMIN — AMIODARONE HYDROCHLORIDE 200 MG: 200 TABLET ORAL at 13:16

## 2021-09-28 RX ADMIN — AMIODARONE HYDROCHLORIDE 200 MG: 200 TABLET ORAL at 21:46

## 2021-09-28 RX ADMIN — CEFAZOLIN SODIUM 2000 MG: 2 SOLUTION INTRAVENOUS at 13:16

## 2021-09-28 RX ADMIN — ATORVASTATIN CALCIUM 80 MG: 80 TABLET, FILM COATED ORAL at 17:58

## 2021-09-28 RX ADMIN — INSULIN GLARGINE 30 UNITS: 100 INJECTION, SOLUTION SUBCUTANEOUS at 20:01

## 2021-09-28 RX ADMIN — HYDROMORPHONE HYDROCHLORIDE 0.5 MG: 1 INJECTION, SOLUTION INTRAMUSCULAR; INTRAVENOUS; SUBCUTANEOUS at 03:04

## 2021-09-28 RX ADMIN — OXYCODONE HYDROCHLORIDE 5 MG: 5 TABLET ORAL at 18:02

## 2021-09-28 RX ADMIN — FUROSEMIDE 40 MG: 10 INJECTION, SOLUTION INTRAMUSCULAR; INTRAVENOUS at 13:15

## 2021-09-28 RX ADMIN — ACETAMINOPHEN 975 MG: 325 TABLET, FILM COATED ORAL at 19:57

## 2021-09-28 RX ADMIN — ASPIRIN 325 MG ORAL TABLET 325 MG: 325 PILL ORAL at 08:38

## 2021-09-28 RX ADMIN — OXYCODONE HYDROCHLORIDE 5 MG: 5 TABLET ORAL at 07:17

## 2021-09-28 RX ADMIN — Medication 1000 UNITS: at 08:38

## 2021-09-28 RX ADMIN — PANTOPRAZOLE SODIUM 40 MG: 40 TABLET, DELAYED RELEASE ORAL at 08:38

## 2021-09-28 RX ADMIN — OXYCODONE HYDROCHLORIDE 5 MG: 5 TABLET ORAL at 01:38

## 2021-09-28 RX ADMIN — ACETAMINOPHEN 975 MG: 325 TABLET, FILM COATED ORAL at 04:24

## 2021-09-28 RX ADMIN — ACETAMINOPHEN 975 MG: 325 TABLET, FILM COATED ORAL at 13:15

## 2021-09-28 RX ADMIN — MUPIROCIN 1 APPLICATION.: 20 OINTMENT TOPICAL at 08:39

## 2021-09-28 RX ADMIN — FONDAPARINUX SODIUM 2.5 MG: 2.5 INJECTION, SOLUTION SUBCUTANEOUS at 08:38

## 2021-09-28 RX ADMIN — CEFAZOLIN SODIUM 2000 MG: 2 SOLUTION INTRAVENOUS at 03:37

## 2021-09-28 RX ADMIN — MUPIROCIN 1 APPLICATION.: 20 OINTMENT TOPICAL at 21:46

## 2021-09-28 RX ADMIN — CHLORHEXIDINE GLUCONATE 15 ML: 1.2 SOLUTION ORAL at 17:58

## 2021-09-28 NOTE — UTILIZATION REVIEW
"Continued Stay Review    Date: 9/28/2021                     Current Patient Class: Inpatient  Current Level of Care: Med Surg    HPI:61 y o  male initially admitted on 9/23/2021    SURGERY DATE: 9/27/2021  PROCEDURE: Coronary artery bypass grafting x 2 with left internal mammary artery to left anterior descending, saphenous vein graft to left posterolateral branch  ANESTHESIA: General endotracheal anesthesia with transesophageal echocardiogram guidance    Assessment/Plan: POD # 1 s/p CABG x 2  Extubated without difficulty  Received 1L LR and 250 ml albumin for hypotension  Required low dose neosynephrine for blood pressure support which has now been weaned off  Had appropriate CI overnight   Trend neuro exams, Maintain epicardial pacing wires A/V, Continue aspirin and atorvastatin, Continue chest tubes #1, #2 and #3 to suction (-20cm), IV Lasix and PO K, Replenish electrolytes with goals: K >4 0, Mag >2 0, and Phos >3 0, Cardiac/Diabetic diet with 1800 ml fluid restriction, strict I/O,        Vital Signs: BP 98/61 (BP Location: Right arm)   Pulse 82   Temp 98 8 °F (37 1 °C) (Oral)   Resp 14   Ht 5' 7\" (1 702 m)   Wt 95 2 kg (209 lb 12 8 oz)   SpO2 91%   BMI 32 86 kg/m²      VTE Pharmacologic Prophylaxis: Fondaparinux (Arixtra)  VTE Mechanical Prophylaxis: sequential compression device       Pertinent Labs/Diagnostic Results:   Results from last 7 days   Lab Units 09/22/21  1707   SARS-COV-2  Negative     Results from last 7 days   Lab Units 09/28/21  0334 09/27/21  1955 09/27/21  1244 09/27/21  1240 09/27/21  1145 09/24/21  0303 09/23/21  0219 09/23/21  0219 09/22/21  1716   WBC Thousand/uL 20 26*  --   --   --   --  7 41  --  7 03 7 47   HEMOGLOBIN g/dL 12 6 13 1  --  13 0  --  11 9*  --  11 9* 13 1   I STAT HEMOGLOBIN g/dl  --   --  11 9*  --  10 5*  --    < >  --   --    HEMATOCRIT % 39 1 39 3  --  39 9  --  36 5  --  35 9* 39 4   HEMATOCRIT, ISTAT %  --   --  35*  --  31*  --    < >  --   --  " PLATELETS Thousands/uL 258  --   --  198  --  262  --  250 287   NEUTROS ABS Thousands/µL  --   --   --   --   --   --   --   --  4 93    < > = values in this interval not displayed  Results from last 7 days   Lab Units 09/28/21  0334 09/27/21  1955 09/27/21  1619 09/27/21  1244 09/27/21  1240 09/27/21  1145 09/27/21  1104 09/27/21  1042 09/27/21  0951 09/27/21  0825 09/26/21  0555 09/25/21  0103 09/25/21  0103 09/24/21  0303   SODIUM mmol/L 138  --   --   --  140  --   --   --   --   --  140  --  141 141   POTASSIUM mmol/L 4 0 4 2 4 1  --  4 0  --   --   --   --   --  4 3  --  4 0 3 9   CHLORIDE mmol/L 110*  --   --   --  113*  --   --   --   --   --  110*  --  109* 108   CO2 mmol/L 21  --   --   --  23  --   --   --   --   --  29  --  30 30   CO2, I-STAT mmol/L  --   --   --  23  --  23 30 27 25 26  --    < >  --   --    ANION GAP mmol/L 7  --   --   --  4  --   --   --   --   --  1*  --  2* 3*   BUN mg/dL 23  --   --   --  18  --   --   --   --   --  19  --  19 19   CREATININE mg/dL 0 98  --   --   --  0 82  --   --   --   --   --  1 00  --  0 97 0 99   EGFR ml/min/1 73sq m 83  --   --   --  96  --   --   --   --   --  81  --  84 82   CALCIUM mg/dL 7 8*  --   --   --  8 0*  --   --   --   --   --  8 8  --  8 7 8 6   CALCIUM, IONIZED, ISTAT mmol/L  --   --   --   --   --  1 23 1 06* 1 02* 1 17 1 21  --   --   --   --    MAGNESIUM mg/dL 2 9*  --   --   --   --   --   --   --   --   --   --   --   --  2 4    < > = values in this interval not displayed       Results from last 7 days   Lab Units 09/23/21  0219 09/22/21  1716   AST U/L 20 25   ALT U/L 27 30   ALK PHOS U/L 74 84   TOTAL PROTEIN g/dL 6 5 7 5   ALBUMIN g/dL 3 5 4 0   TOTAL BILIRUBIN mg/dL 0 57 0 67     Results from last 7 days   Lab Units 09/28/21  1336 09/28/21  1042 09/28/21  0912 09/28/21  0610 09/28/21  0334 09/28/21  0157 09/28/21  0002 09/27/21  2200 09/27/21  1958 09/27/21  1804 09/27/21  1608 09/27/21  1422   POC GLUCOSE mg/dl 242* 226* 188* 117 102 105 103 108 95 98 116 120     Results from last 7 days   Lab Units 09/28/21  0334 09/27/21  1240 09/26/21  0555 09/25/21  0103 09/24/21  0303 09/23/21  0219 09/22/21  1716   GLUCOSE RANDOM mg/dL 105 137 103 119 95 91 99         Results from last 7 days   Lab Units 09/25/21  0103   HEMOGLOBIN A1C % 6 3*   EAG mg/dl 134         Results from last 7 days   Lab Units 09/27/21  1244 09/27/21  1145 09/27/21  1104 09/27/21  1042 09/27/21  0825   PH, VAN I-STAT   --   --   --  7 344 7  359   PCO2, VAN ISTAT mm HG  --   --   --  47 5 43 6   PO2, VAN ISTAT mm HG  --   --   --  47 0* 32 0*   HCO3, VAN ISTAT mmol/L  --   --   --  25 9 24 6   I STAT BASE EXC mmol/L -3* -2 2 0 -1   I STAT O2 SAT % 97* 98*  --  80 58*   ISTAT PH ART  7 374 7 389 7 332*  --   --    I STAT ART PCO2 mm HG 37 6 36 9 54 0*  --   --    I STAT ART PO2 mm HG 91 0 108 0 >400 0*  --   --    I STAT ART HCO3 mmol/L 21 9* 22 3 28 5*  --   --          Results from last 7 days   Lab Units 09/23/21  0549 09/22/21 2012 09/22/21  1716   TROPONIN I ng/mL 1 87* 1 70* 1 66*         Results from last 7 days   Lab Units 09/27/21  1240 09/27/21  0424 09/26/21  1944 09/25/21  0103   PROTIME seconds 15 3*  --   --  13 9   INR  1 26*  --   --  1 11   PTT seconds 32 >210* 63*  --          Results from last 7 days   Lab Units 09/22/21  1716   NT-PRO BNP pg/mL 2,025*       Medications:   Scheduled Medications:  acetaminophen, 975 mg, Oral, Q8H  amiodarone, 200 mg, Oral, Q8H SOLEDAD  aspirin, 325 mg, Oral, Daily  atorvastatin, 80 mg, Oral, Daily With Dinner  chlorhexidine, 15 mL, Mouth/Throat, BID  cholecalciferol, 1,000 Units, Oral, Daily  docusate sodium, 100 mg, Oral, BID  fondaparinux, 2 5 mg, Subcutaneous, Daily  furosemide, 40 mg, Intravenous, Daily  metoprolol tartrate, 12 5 mg, Oral, Q12H SOLEDAD  mupirocin, 1 application, Nasal, B63Q SOLEDAD  pantoprazole, 40 mg, Oral, Daily  polyethylene glycol, 17 g, Oral, Daily  potassium chloride, 20 mEq, Oral, Daily    Continuous IV Infusions:  insulin regular (HumuLIN R,NovoLIN R) infusion, 0 3-21 Units/hr, Intravenous, Titrated  sodium chloride, 20 mL/hr, Intravenous, Continuous  niCARdipine, 2 5-15 mg/hr  phenylephine,  mcg/min, Last Rate: Stopped (09/28/21 3300)    PRN Meds:  bisacodyl, 10 mg, Rectal, Daily PRN  lidocaine (cardiac), 100 mg, Intravenous, Q30 Min PRN  ondansetron, 4 mg, Intravenous, Q6H PRN  oxyCODONE, 2 5 mg, Oral, Q4H PRN  oxyCODONE, 5 mg, Oral, Q4H PRN  temazepam, 15 mg, Oral, HS PRN        Discharge Plan: D    Network Utilization Review Department  ATTENTION: Please call with any questions or concerns to [phone] and carefully listen to the prompts so that you are directed to the right person  All voicemails are confidential   Blaine Roper all requests for admission clinical reviews, approved or denied determinations and any other requests to dedicated fax number below belonging to the campus where the patient is receiving treatment   List of dedicated fax numbers for the Facilities:  [address] DENIALS (Administrative/Medical Necessity) [phone]   [address] (Maternity/NICU/Pediatrics) [phone]   2 Kimi Stevens [phone]   [address] [address] [place] [address] [phone]   15[address] [facility] [address] [phone]

## 2021-09-28 NOTE — PLAN OF CARE
Problem: PHYSICAL THERAPY ADULT  Goal: Performs mobility at highest level of function for planned discharge setting  See evaluation for individualized goals  Description: Treatment/Interventions: Functional transfer training, LE strengthening/ROM, Elevations, Therapeutic exercise, Endurance training, Equipment eval/education, Bed mobility, Gait training, Spoke to nursing  Equipment Recommended: Kristopher Baker (at this time)       See flowsheet documentation for full assessment, interventions and recommendations  Outcome: Progressing  Note: Prognosis: Good  Problem List: Decreased strength, Decreased endurance, Impaired balance, Decreased mobility  Assessment: Additional follow up consecutive session performed to initiate LE therex in order to max strength and to facilitate progression w/ functional mobility skills and overall level of (I)  Pt was able to complete the progress in an AROM mode w/ rest periods provided as needed; pt remained in NAD and appeared to be comfortable at the end of session; overall, cont to anticipate pt will return home w/ family support upon D/C pending additional progress and when medically cleared; home PT follow up may need to be considered; will follow  Barriers to Discharge: Inaccessible home environment        PT Discharge Recommendation: Home with home health rehabilitation (home PT at this time)          See flowsheet documentation for full assessment

## 2021-09-28 NOTE — PROGRESS NOTES
Progress Note - Critical Care   Dana Armendariz 62 y o  male MRN: [id number]  Unit/Bed#: [facility] 414-01 Encounter: [id number]      Attending Physician: Reuben Lindo MD    24 Hour Events/HPI: POD # 1 s/p CABG x 2  Extubated without difficulty  Received 1L LR and 250 ml albumin for hypotension  Required low dose neosynephrine for blood pressure support which has now been weaned off  Had appropriate CI overnight and was delined this AM      ROS: Review of Systems   Respiratory: Negative for cough and shortness of breath  Cardiovascular: Positive for chest pain (incisional)  Gastrointestinal: Negative for abdominal pain and diarrhea      ---------------------------------------------------------------------------------------------------------------------------------------------------------------------  Impressions:  1  CAD s/p CABG x 2  2  HTN  3  HLD  4  DM2  5  Obesity  6  ICM EF 45%  7  Obesity    Plan:    Neuro:   · Pain controlled with:   · Tylenol 975 mg PO q8h   · Oxycodone 2 5 mg PO q4h PRN  · Oxycodone 5 mg PO q4h PRN  · Dilaudid 0 5 mg IV q2h PRN  · Regulate sleep/wake cycle  · Delirium precautions  · CAM-ICU daily  · Trend neuro exam    CV:   · Cardiac infusions: Neosynephrine, 0 mcg/min  · Wean braydon as able  · MAP goal > 65 and CI >2 2  · D/C [place] Neeta catheter  · D/C Arterial line  · Rhythm: NSR  · Follow rhythm on telemetry  · Lopressor 12 5 mg PO BID   · Amiodarone 200 mg PO q8h  · Maintain epicardial pacing wires for pacing PRN  · Continue aspirin and atorvastatin     Lung:   · Acute post-op pulmonary insufficiency; Requiring 5 liters via nasal cannula, secondary to poor inspiratory effort secondary to pain  Continue incentive spirometry/coughing/deep breathing exercises    Wean supplemental oxygen as tolerated for saturation > 90%  · Chlorhexidine ordered: yes  · Chest tube output remains persistently high; Continue chest tubes to suction today    GI:   · Continue PPI for stress ulcer prophylaxis  · Continue bowel regimen  · Trend abdominal exam and bowel function    FEN:   · Diuretic plan: Lasix 40 mg IV q QD  · K-dur 20 mEQ PO q QD  · Nutrition/diet plan: Cardiac/Diabetic diet with 1800 ml fluid restriction  · Replenish electrolytes with goals: K >4 0, Mag >2 0, and Phos >3 0    :   · Indwelling Almanza present: yes   · D/C Almanza  · Trend UOP and BUN/creat  · Strict I and O    ID:   · Trend temps and WBC count  · Maintain normothermia        Heme:   · Trend hgb and plts    Endo:   · Glycemic control plan: History of diabetes: Continue insulin infusion today   Consult Endocrinology for management    Results from last 6 Months   Lab Units 09/25/21  0103 07/20/21  1223   HEMOGLOBIN A1C % 6 3* 6 9*     MSK/Skin:  · Mobility goal: OOB to chair  · PT consult: yes  · OT consult: yes  · Frequent turning and pressure off-loading  · Local wound care as needed    Disposition:  Transfer to telemetry  ---------------------------------------------------------------------------------------------------------------------------------------------------------------------  Allergies: No Known Allergies  Medications:   Scheduled Meds:  Current Facility-Administered Medications   Medication Dose Route Frequency Provider Last Rate   • acetaminophen  650 mg Rectal Q4H PRN Mellissa Lemons PA-C     • acetaminophen  975 mg Oral Q8H Mellissa Lemons PA-C     • amiodarone  200 mg Oral [facility] Mellissa Lemons PA-C     • aspirin  325 mg Oral Daily Mellissa Lemons PA-C     • atorvastatin  80 mg Oral Daily With ANNE Bryant     • bisacodyl  10 mg Rectal Daily PRN Mellissa Lemons PA-C     • calcium chloride  1 g Intravenous Once Mellissa Lemons PA-C     • cefazolin  2,000 mg Intravenous Q8H Mellissa Lemons PA-C 2,000 mg (09/28/21 2136)   • chlorhexidine  15 mL Mouth/Throat BID Mellissa Lemons PA-C     • cholecalciferol  1,000 Units Oral Daily Mellissa Lemons PA-C     • fentanyl citrate (PF)  50 mcg Intravenous Once Mellissa Lemons PA-C • fentanyl citrate (PF)  50 mcg Intravenous Q1H PRN Virl Mail, ANNE     • fondaparinux  2 5 mg Subcutaneous Daily Virl Mail, ANNE     • furosemide  40 mg Intravenous Q6H PRN Virl Mail, ANNE     • HYDROmorphone  0 5 mg Intravenous Q2H PRN Virl Mail, PA-C     • insulin regular (HumuLIN R,NovoLIN R) infusion  0 3-21 Units/hr Intravenous Titrated Virl Mail, ANNE Stopped (09/27/21 1958)   • lactated ringers  500 mL Intravenous Q30 Min PRN Virl Mail, PA-C Stopped (09/27/21 4250)   • lidocaine (cardiac)  100 mg Intravenous Q30 Min PRN Virl Mail, ANNE     • mupirocin  1 application Nasal [address] [facility] Virl Mail, PATOMÁS     • niCARdipine  2 5-15 mg/hr Intravenous Titrated Virl Mail, ANNE     • ondansetron  4 mg Intravenous Q6H PRN Virl Mail, ANNE     • oxyCODONE  2 5 mg Oral Q4H PRN Virl Mail, PA-C     • oxyCODONE  5 mg Oral Q4H PRN Virl Mail, PA-FREDY     • pantoprazole  40 mg Oral Daily Virl Mail, ANNE     • phenylephine   mcg/min Intravenous Titrated Virl Mail, ANNE Stopped (09/28/21 3993)   • polyethylene glycol  17 g Oral Daily Virl Mail, PA-FREDY     • potassium chloride  20 mEq Intravenous Once PRN Virl Mail, PA-C     • potassium chloride  20 mEq Intravenous Q1H PRN Virl Mail, PA-C     • potassium chloride  20 mEq Intravenous Q30 Min PRN Virl Mail, PAJessieC     • sodium chloride  20 mL/hr Intravenous Continuous Virl Mail, ANNE Stopped (09/28/21 3163)     VTE Pharmacologic Prophylaxis: Fondaparinux (Arixtra)  VTE Mechanical Prophylaxis: sequential compression device    Continuous Infusions:insulin regular (HumuLIN R,NovoLIN R) infusion, 0 3-21 Units/hr, Last Rate: Stopped (09/27/21 1958)  niCARdipine, 2 5-15 mg/hr  phenylephine,  mcg/min, Last Rate: Stopped (09/28/21 0415)  sodium chloride, 20 mL/hr, Last Rate: Stopped (09/28/21 0430)      PRN Meds:  acetaminophen, 650 mg, Q4H PRN  bisacodyl, 10 mg, Daily PRN  fentanyl citrate (PF), 50 mcg, Q1H PRN  furosemide, 40 mg, Q6H PRN  HYDROmorphone, 0 5 mg, Q2H PRN  lactated ringers, 500 mL, Q30 Min PRN  lidocaine (cardiac), 100 mg, Q30 Min PRN  ondansetron, 4 mg, Q6H PRN  oxyCODONE, 2 5 mg, Q4H PRN  oxyCODONE, 5 mg, Q4H PRN  potassium chloride, 20 mEq, Once PRN  potassium chloride, 20 mEq, Q1H PRN  potassium chloride, 20 mEq, Q30 Min PRN      Home Medications:   Prior to Admission medications    Medication Sig Start Date End Date Taking? Authorizing Provider   cholecalciferol (VITAMIN D3) 1,000 units tablet Take 5,000 Units by mouth daily    Historical Provider, MD   LANTUS SOLOSTAR 100 units/mL injection pen 36 Units daily at bedtime  3/7/20   Historical Provider, MD   losartan-hydrochlorothiazide (HYZAAR) 100-25 MG per tablet Take 1 tablet by mouth daily     Historical Provider, MD   metFORMIN (GLUCOPHAGE) 1000 MG tablet Take 1,000 mg by mouth 2 (two) times a day with meals    Historical Provider, MD   rosuvastatin (CRESTOR) 5 mg tablet Take 5 mg by mouth MWF three times a week    Historical Provider, MD     ---------------------------------------------------------------------------------------------------------------------------------------------------------------------  Vitals:   Vitals:    21 0400 21 0500 21 0551 21 0600   BP:  115/66     Pulse: 82 78  79   Resp: (!) 24 19  13   Temp: 99 1 °F (37 3 °C)      TempSrc: Core      SpO2: 97% 96%  97%   Weight:   95 2 kg (209 lb 12 8 oz)    Height:         Arterial Line:  Arterial Line BP: 104/56  Arterial Line MAP (mmHg): 69 mmHg    Tele Rhythm: NSR This was personally reviewed by myself      Respiratory:  SpO2: SpO2: 97 %, SpO2 Activity: SpO2 Activity: At Rest, SpO2 Device: O2 Device: Nasal cannula  Nasal Cannula O2 Flow Rate (L/min): 5 L/min    Temperature: Temp (24hrs), Av 7 °F (37 1 °C), Min:97 5 °F (36 4 °C), Max:99 3 °F (37 4 °C)  Current: Temperature: 99 1 °F (37 3 °C)    Weights:   Weight (last 2 days)     Date/Time   Weight    21 0551   95 2 (209 8)    09/27/21 0609   92 4 (203 6)            Body mass index is 32 86 kg/m²  Hemodynamic Monitoring:  PAP: PAP: 32/21, CVP: CVP (mean): 15 mmHg, CO: CO (L/min): 5 3 L/min, CI: CI (L/min/m2): 2 5 L/min/m2, SVR: SVR (dyne*sec)/cm5: 866 (dyne*sec)/cm5    Intake and Outputs:    Intake/Output Summary (Last 24 hours) at 9/28/2021 0651  Last data filed at 9/28/2021 0200  Gross per 24 hour   Intake 5401 02 ml   Output 3070 ml   Net 2331 02 ml     I/O last 24 hours: In: 5523 2 [I V :3148 2; IV Piggyback:1375]  Out: 7466 [Urine:1780; Blood:1000; Chest Tube:640]    UOP: 30 ml/hour   BM: 0 in the last 24 hours    Chest tube Output:  Mediastinal tubes: 70 mL/8 hours  410 mL/24 hours   Pleural tubes: 50 mL/8 hours  230 mL/24 hours     Labs:  Results from last 7 days   Lab Units 09/28/21 0334 09/27/21 1955 09/27/21  1244 09/27/21  1240 09/24/21  0303 09/23/21  0219 09/23/21  0219 09/22/21  1716   WBC Thousand/uL 20 26*  --   --   --  7 41  --  7 03 7 47   HEMOGLOBIN g/dL 12 6 13 1  --  13 0 11 9*  --  11 9* 13 1   I STAT HEMOGLOBIN g/dl  --   --  11 9*  --   --    < >  --   --    HEMATOCRIT % 39 1 39 3  --  39 9 36 5  --  35 9* 39 4   HEMATOCRIT, ISTAT %  --   --  35*  --   --    < >  --   --    PLATELETS Thousands/uL 258  --   --  198 262  --  250 287   NEUTROS PCT %  --   --   --   --   --   --   --  66   MONOS PCT %  --   --   --   --   --   --   --  10    < > = values in this interval not displayed       Results from last 7 days   Lab Units 09/28/21 0334 09/27/21 1955 09/27/21  1619 09/27/21  1244 09/27/21  1240 09/27/21  1145 09/27/21  1104 09/26/21  0555 09/24/21  0303 09/23/21  0219 09/22/21  1716   SODIUM mmol/L 138  --   --   --  140  --   --  140  --  143 140   POTASSIUM mmol/L 4 0 4 2 4 1  --  4 0  --   --  4 3  --  3 6 4 3   CHLORIDE mmol/L 110*  --   --   --  113*  --   --  110*  --  107 105   CO2 mmol/L 21  --   --   --  23  --   --  29  --  25 25   CO2, I-STAT mmol/L  --   --   --  23  --  23 30  -- < >  --   --    BUN mg/dL 23  --   --   --  18  --   --  19  --  21 20   CREATININE mg/dL 0 98  --   --   --  0 82  --   --  1 00  --  1 09 1 00   CALCIUM mg/dL 7 8*  --   --   --  8 0*  --   --  8 8  --  8 8 9 0   ALK PHOS U/L  --   --   --   --   --   --   --   --   --  74 84   ALT U/L  --   --   --   --   --   --   --   --   --  27 30   AST U/L  --   --   --   --   --   --   --   --   --  20 25   GLUCOSE, ISTAT mg/dl  --   --   --  139  --  150* 149*  --    < >  --   --     < > = values in this interval not displayed  Baseline Creat: 0 9-1 1    Results from last 7 days   Lab Units 09/28/21  0334 09/24/21  0303   MAGNESIUM mg/dL 2 9* 2 4          Results from last 7 days   Lab Units 09/27/21  1240 09/27/21  0424 09/26/21  1944 09/25/21  0103   INR  1 26*  --   --  1 11   PTT seconds 32 >210* 63*  --      Diagnositic Studies:  09/28/21 CXR: Atelectasis left base  No PTX appreciated  Lines in appropriate position  Official read pending  This was personally reviewed by myself in PACS   09/28/21 EKG: NSR  T wave inversion anteriorly - present post-op  This was personally reviewed by myself  Nutrition:        Diet Orders   (From admission, onward)             Start     Ordered    09/28/21 0000  Diet Cardiovascular; Cardiac; Fluid Restriction 1800 ML, Consistent Carbohydrate Diet Level 2 (5 carb servings/75 grams CHO/meal)  Diet effective 0500     Question Answer Comment   Diet Type Cardiovascular    Cardiac Cardiac    Other Restriction(s): Fluid Restriction 1800 ML    Other Restriction(s): Consistent Carbohydrate Diet Level 2 (5 carb servings/75 grams CHO/meal)    RD to adjust diet per protocol? No        09/27/21 1235              Physical Exam  HENT:      Head: Normocephalic  Mouth/Throat:      Mouth: Mucous membranes are moist    Eyes:      Pupils: Pupils are equal, round, and reactive to light  Cardiovascular:      Rate and Rhythm: Normal rate and regular rhythm        Pulses:           Radial pulses are 2+ on the right side and 2+ on the left side  Dorsalis pedis pulses are 2+ on the right side and 2+ on the left side  Heart sounds: Friction rub present  Pulmonary:      Effort: No respiratory distress  Breath sounds: No wheezing or rhonchi  Comments: Crackles at bases bilaterally  Abdominal:      General: Bowel sounds are decreased  There is no distension  Palpations: Abdomen is soft  Tenderness: There is no abdominal tenderness  Genitourinary:     Comments: + faust  Musculoskeletal:      Cervical back: Neck supple  Right lower le+ Pitting Edema present  Left lower le+ Pitting Edema present  Skin:     General: Skin is warm and dry  Capillary Refill: Capillary refill takes less than 2 seconds  Neurological:      General: No focal deficit present  Mental Status: He is alert and oriented to person, place, and time  Invasive lines and devices: Invasive Devices     Central Venous Catheter Line            CVC Central Lines 21 Triple <1 day          Peripheral Intravenous Line            Peripheral IV 21 Right Forearm 3 days    Peripheral IV 21 Right Antecubital <1 day          Arterial Line            Arterial Line 21 Left Radial <1 day          Line            Pacer Wires <1 day    Pacer Wires <1 day          Drain            Chest Tube 1 Left Pleural 32 Fr  <1 day    Chest Tube 2 Posterior Mediastinal 32 Fr  <1 day    Chest Tube 3 Anterior Mediastinal 32 Fr  <1 day    Urethral Catheter Non-latex;Straight-tip; Temperature probe 16 Fr  <1 day              ---------------------------------------------------------------------------------------------------------------------------------------------------------------------  Code Status: Level 1 - Full Code    Care Time Delivered:   No Critical Care time spent     Collaborative bedside rounds performed with cardiac surgery attending, critical care attending and bedside STEPHANY Beltran PA-C  DATE: September 28, 2021  TIME: 6:51 AM

## 2021-09-28 NOTE — PHYSICAL THERAPY NOTE
PHYSICAL THERAPY NOTE          Patient Name: Latisha Burkett  OKFKW'H Date: 9/28/2021 09/28/21 0948   PT Last Visit   PT Visit Date 09/28/21   Note Type   Note Type Treatment   Pain Assessment   Pain Assessment Tool 0-10   Pain Score No Pain   Restrictions/Precautions   Other Precautions Cardiac/sternal;Multiple lines;Telemetry;O2  (CT)   General   Chart Reviewed Yes   Additional Pertinent History additional follow up Tx session performed to initiate therex   Response to Previous Treatment Patient with no complaints from previous session  Cognition   Overall Cognitive Status WFL   Arousal/Participation Alert; Cooperative   Attention Attends with cues to redirect   Orientation Level Oriented to person;Oriented to place;Oriented to situation   Memory Within functional limits   Following Commands Follows one step commands without difficulty   Subjective   Subjective Alert; in the chair; agreeable to perform therex   Exercises   Knee AROM Long Arc Quad Sitting;10 reps;AROM; Bilateral  (2 sets)   Ankle Pumps Sitting;10 reps;AROM; Bilateral  (2 sets)   Marching Sitting;10 reps;AROM; Bilateral  (2 sets)   Equipment Use   Comments Pt was reclined in the chair w/ LE elevated at the end of session; (R) SCD back on; call bell w/in reach   Assessment   Prognosis Good   Problem List Decreased strength;Decreased endurance; Impaired balance;Decreased mobility   Assessment Additional follow up consecutive session performed to initiate LE therex in order to max strength and to facilitate progression w/ functional mobility skills and overall level of (I)   Pt was able to complete the progress in an AROM mode w/ rest periods provided as needed; pt remained in NAD and appeared to be comfortable at the end of session; overall, cont to anticipate pt will return home w/ family support upon D/C pending additional progress and when medically cleared; home PT follow up may need to be considered; will follow  Barriers to Discharge Inaccessible home environment   Goals   Patient Goals to get better and to go home   STG Expiration Date 10/08/21   PT Treatment Day 1   Plan   Treatment/Interventions Functional transfer training;LE strengthening/ROM; Elevations; Therapeutic exercise; Endurance training;Equipment eval/education; Bed mobility;Gait training;Spoke to nursing   Progress Progressing toward goals   PT Frequency Other (Comment)  (4-6x/wk)   Recommendation   PT Discharge Recommendation Home with home health rehabilitation  (home PT at this time)   Equipment Recommended Walker  (at this time)   Julien Fraser walker   [place] 8 in Bed Without Bedrails 2   Lying on Back to Sitting on Edge of Flat Bed 2   Moving Bed to Chair 3   Standing Up From Chair 3   Walk in Room 3   Climb 3-5 Stairs 2   Basic Mobility Inpatient Raw Score 15   Basic Mobility Standardized Score 36 97       [facility], PT

## 2021-09-28 NOTE — PROGRESS NOTES
Progress Note - Cardiothoracic Surgery   Huseyin Day 62 y o  male MRN: [id number]  Unit/Bed#: [facility] 414-01 Encounter: [id number]      POD # 1 s/p CABG    Pt seen/examined  Interval history and data reviewed with critical care team   Pt doing well  No specific complaints  Extubated and sitting comfortably in chair  No vasoactive gtts        Medications:   Scheduled Meds:  Current Facility-Administered Medications   Medication Dose Route Frequency Provider Last Rate   • acetaminophen  975 mg Oral Q8H Adria Cha PA-C     • amiodarone  200 mg Oral [facility] Kyara Aguirre     • aspirin  325 mg Oral Daily Adria Cha PA-C     • atorvastatin  80 mg Oral Daily With ANNE Bryant     • bisacodyl  10 mg Rectal Daily PRN Adria Cha PA-C     • cefazolin  2,000 mg Intravenous Q8H Adria Cha PA-C Stopped (09/28/21 0400)   • chlorhexidine  15 mL Mouth/Throat BID Adria Cha PA-C     • cholecalciferol  1,000 Units Oral Daily Adria Cha PA-C     • fondaparinux  2 5 mg Subcutaneous Daily Adria Cha PA-C     • HYDROmorphone  0 5 mg Intravenous Q2H PRN Adria Cha PA-C     • insulin regular (HumuLIN R,NovoLIN R) infusion  0 3-21 Units/hr Intravenous Titrated Adria Cha PA-C Stopped (09/27/21 1958)   • lactated ringers  500 mL Intravenous Q30 Min PRN Adria Cha PA-C Stopped (09/27/21 1245)   • lidocaine (cardiac)  100 mg Intravenous Q30 Min PRN Adria Cha PA-C     • mupirocin  1 application Nasal [address] Adria Cha PA-C     • niCARdipine  2 5-15 mg/hr Intravenous Titrated Adria Cha PA-C     • ondansetron  4 mg Intravenous Q6H PRN Adria Cha PA-C     • oxyCODONE  2 5 mg Oral Q4H PRN Adria Cha PA-C     • oxyCODONE  5 mg Oral Q4H PRN Adria Cha PA-C     • pantoprazole  40 mg Oral Daily Adria Cha PA-C     • phenylephine   mcg/min Intravenous Titrated Adria Cha PA-C Stopped (09/28/21 7726)   • polyethylene glycol  17 g Oral Daily Adria Cha PA-C     • "sodium chloride  20 mL/hr Intravenous Continuous Monet Lau PA-C Stopped (09/28/21 0430)     Continuous Infusions:insulin regular (HumuLIN R,NovoLIN R) infusion, 0 3-21 Units/hr, Last Rate: Stopped (09/27/21 1958)  niCARdipine, 2 5-15 mg/hr  phenylephine,  mcg/min, Last Rate: Stopped (09/28/21 1706)  sodium chloride, 20 mL/hr, Last Rate: Stopped (09/28/21 0430)      PRN Meds: bisacodyl  •  HYDROmorphone  •  lactated ringers  •  lidocaine (cardiac)  •  ondansetron  •  oxyCODONE  •  oxyCODONE    Vitals: Blood pressure 115/66, pulse 79, temperature 99 1 °F (37 3 °C), temperature source Core, resp  rate 14, height 5' 7\" (1 702 m), weight 95 2 kg (209 lb 12 8 oz), SpO2 94 %  ,Body mass index is 32 86 kg/m²  I/O last 24 hours: In: 5551 3 [I V :3126 3; IV Piggyback:1425]  Out: 1630 [SQTKS:5109; Blood:1000; Chest Tube:730]  Invasive Devices     Central Venous Catheter Line            CVC Central Lines 09/27/21 Triple <1 day          Peripheral Intravenous Line            Peripheral IV 09/27/21 Right Antecubital <1 day          Arterial Line            Arterial Line 09/27/21 Left Radial <1 day          Line            Pacer Wires <1 day    Pacer Wires <1 day          Drain            Chest Tube 1 Left Pleural 32 Fr  <1 day    Chest Tube 2 Posterior Mediastinal 32 Fr  <1 day    Chest Tube 3 Anterior Mediastinal 32 Fr  <1 day    Urethral Catheter Non-latex;Straight-tip; Temperature probe 16 Fr  <1 day                  Lab, Imaging and other studies:   Results from last 7 days   Lab Units 09/28/21  0334 09/27/21  1955 09/27/21  1244 09/27/21  1240 09/24/21  0303 09/23/21  0219 09/23/21  0219   WBC Thousand/uL 20 26*  --   --   --  7 41  --  7 03   HEMOGLOBIN g/dL 12 6 13 1  --  13 0 11 9*  --  11 9*   I STAT HEMOGLOBIN g/dl  --   --  11 9*  --   --    < >  --    HEMATOCRIT % 39 1 39 3  --  39 9 36 5  --  35 9*   HEMATOCRIT, ISTAT %  --   --  35*  --   --    < >  --    PLATELETS Thousands/uL 258  --   --  198 262  --  " 250    < > = values in this interval not displayed  Results from last 7 days   Lab Units 09/28/21  0334 09/27/21  1955 09/27/21  1619 09/27/21  1244 09/27/21  1240 09/27/21  1145 09/26/21  0555   POTASSIUM mmol/L 4 0 4 2 4 1  --  4 0   < > 4 3   CHLORIDE mmol/L 110*  --   --   --  113*  --  110*   CO2 mmol/L 21  --   --   --  23  --  29   CO2, I-STAT mmol/L  --   --   --  23  --   --   --    BUN mg/dL 23  --   --   --  18  --  19   CREATININE mg/dL 0 98  --   --   --  0 82  --  1 00   GLUCOSE, ISTAT mg/dl  --   --   --  139  --   --   --    CALCIUM mg/dL 7 8*  --   --   --  8 0*  --  8 8    < > = values in this interval not displayed  Results from last 7 days   Lab Units 09/27/21  1240 09/27/21  0424 09/26/21  1944 09/25/21  0103   INR  1 26*  --   --  1 11   PTT seconds 32 >210* 63*  --      No results for input(s): PHART, FKI0FBU, PO2ART, FAD2ZVZ, [id number], BEART in the last 72 hours  Plan:    DC [place]ellie/Almanza  Continue chest tubes, pacing wires  Transfer to floor  Ambulate  Incentive spirometry  Diuresis  PO ASA/Statin/B blocker        Wilfred Haywood MD  DATE: September 28, 2021  TIME: 7:39 AM

## 2021-09-28 NOTE — PLAN OF CARE
Problem: OCCUPATIONAL THERAPY ADULT  Goal: Performs self-care activities at highest level of function for planned discharge setting  See evaluation for individualized goals  Description: Treatment Interventions: ADL retraining, Functional transfer training, UE strengthening/ROM, Endurance training, Patient/family training, Cardiac education, Activityengagement, Energy conservation          See flowsheet documentation for full assessment, interventions and recommendations  Outcome: Progressing  Note: Limitation: Decreased ADL status, Decreased endurance, Decreased UE ROM, Decreased UE strength  Prognosis: Good  Assessment: Pt is a 62 y o  male admitted to [facility] on 2021 w/ acute coronary syndrome    Pt  has a past medical history of Diabetes mellitus ([facility] [address] ) and Hypertension  Pt with active OT orders and activity as tolerated orders  Pt resides in a single story home with his wife w a ramp to enter  Pt was I w/  ADLS and IADLS, (+) drives, & required no use of DME PTA  Currently pt performs transfers with Tiara, functional ambulation with CGA w a rolling walker, and ADLs with Tiara  Pt is limited at this time 2*: pain, endurance, activity tolerance, functional mobility, balance and decreased I w/ ADLS/IADLS  The following Occupational Performance Areas to address include: grooming, bathing/shower, toilet hygiene, dressing, medication management and functional mobility  Based on the aforementioned OT evaluation, functional performance deficits, and assessments, pt has been identified as a high complexity evaluation  From OT standpoint, anticipate d/c home with family support  Pt to continue to benefit from acute immediate OT services to address the following goals 3-5x/week to  w/in 10-14 days:   The patient's raw score on the AM-PAC Daily Activity inpatient short form is 19, standardized score is 40 22, greater than 39 4   Patients at this level are likely to benefit from discharge to home w increased family support  Please refer to the recommendation of the Occupational Therapist for safe discharge planning       OT Discharge Recommendation: No rehabilitation needs (home w increased social support )  OT - OK to Discharge: Yes (when medically appropriate )

## 2021-09-28 NOTE — PLAN OF CARE
Problem: PHYSICAL THERAPY ADULT  Goal: Performs mobility at highest level of function for planned discharge setting  See evaluation for individualized goals  Description: Treatment/Interventions: Functional transfer training, LE strengthening/ROM, Elevations, Therapeutic exercise, Endurance training, Equipment eval/education, Bed mobility, Gait training, Spoke to nursing  Equipment Recommended: Sg Hernandez (at this time)       See flowsheet documentation for full assessment, interventions and recommendations  9/28/2021 1442 by Omid Carlson  Note: Prognosis: Good  Problem List: Decreased strength, Decreased endurance, Impaired balance, Decreased mobility  Assessment: Pt is a 62 y o  male who presented to [facility] ED with c/o CP and SOB upon exertion  Pt admitted with elevated Troponin levels and transferred to [facility] for cardiac cath  Pt w/ Dx of Left main coronary artery disease and underwent Coronary artery bypass grafting x 2 with left internal mammary artery to left anterior descending, saphenous vein graft to left posterolateral branch on 9/27/21  Pt's pertinent PMH affecting PT POC includes: HTN, Type 2 DM, Anemia, and personal factors of: RAFAEL home  Pt's current clinical presentation is unstable/unpredictable evident through telemetry monitoring, multiple lines, CT, O2 at 1 5L/min  Pt demonstrates generalized post surgical pain and weakness, impaired balance, decreased endurance and decreased activity tolerance, leading to the inability to perform independent functional transfers, and independent household/community ambulation; a functional mobility level significantly below baseline  PT will continue to follow patient, working on progressive mobilization and building activity tolerance, to address the stated impairments and functional limitations  Anticipate pt will return home, if functional mobility improves as expected and pending medical clearance  Home PT follow up is recommended at this time  Barriers to Discharge: Inaccessible home environment        PT Discharge Recommendation: Home with home health rehabilitation (home PT at this time)          See flowsheet documentation for full assessment

## 2021-09-28 NOTE — PHYSICAL THERAPY NOTE
Physical Therapy Evaluation     Patient's Name: Fer Hallman    Admitting Diagnosis  ACS (acute coronary syndrome) ([place] [address] ) [I24 9]    Problem List  Patient Active Problem List   Diagnosis   • Type 2 diabetes mellitus with diabetic polyneuropathy, without long-term current use of insulin ([place] [address] )   • Essential hypertension   • Anemia   • ACS (acute coronary syndrome) ([place] [address] )   • Hyperlipidemia   • CAD (coronary artery disease)   • S/P CABG x 2       Past Medical History  Past Medical History:   Diagnosis Date   • Diabetes mellitus ([place] [address] )    • Hypertension        Past Surgical History  Past Surgical History:   Procedure Laterality Date   • CARDIAC CATHETERIZATION  9/23/2021   • INCISION AND DRAINAGE OF WOUND Right 9/8/2019    Procedure: INCISION AND DRAINAGE (I&D) EXTREMITY;  Surgeon: Torie Nunez DPM;  Location: [facility];  Service: Podiatry   • CT CABG, ARTERY-VEIN, TWO N/A 9/27/2021    Procedure: CORONARY ARTERY BYPASS GRAFT (CABG) 2 VESSELS MALKA to LAD -SVG--> L Posterior lateral, EVH from Left leg;  Surgeon: Adeel Cardenas MD;  Location: [facility];  Service: Cardiac Surgery        09/28/21 0938   PT Last Visit   PT Visit Date 09/28/21   Note Type   Note type Evaluation   Pain Assessment   Pain Assessment Tool FLACC   Pain Location/Orientation Orientation: Mid;Location: Chest;Location: Incision   Pain Onset/Description Onset: Ongoing; Descriptor: Discomfort   Effect of Pain on Daily Activities Guarding   Patient's Stated Pain Goal No pain   Hospital Pain Intervention(s) Repositioned; Ambulation/increased activity; Emotional support   Pain Rating: FLACC (Rest) - Face 1   Pain Rating: FLACC (Rest) - Legs 0   Pain Rating: FLACC (Rest) - Activity 0   Pain Rating: FLACC (Rest) - Cry 0   Pain Rating: FLACC (Rest) - Consolability 0   Score: FLACC (Rest) 1   Pain Rating: FLACC (Activity) - Face 1   Pain Rating: FLACC (Activity) - Legs 0   Pain Rating: FLACC (Activity) - Activity 1   Pain Rating: FLACC (Activity) - Cry 0   Pain Rating: FLACC (Activity) - Consolability 0   Score: FLACC (Activity) 2   Home Living   Type of [address] One level;Stairs to enter without rails  (1STE w/o rail)   Home Equipment Walker   Prior Function   Level of [place] Independent with ADLs and functional mobility  (Pt denies use of AD PTA)   Lives With Spouse   Receives Help From Family  (Spouse and two adult children able to help prn)   Falls in the last 6 months 0   Vocational Part time employment   Restrictions/Precautions   Weight Bearing Precautions Per Order No   Braces or Orthoses Other (Comment)  (Pt denies)   Other Precautions Cardiac/sternal;Multiple lines;Telemetry;O2;Pain  (CT)   General   Additional Pertinent History Cleared for assessment, spoke to STEPHANY Fernandes   Family/Caregiver Present No   Cognition   Overall Cognitive Status WFL   Arousal/Participation Alert   Attention Within functional limits   Orientation Level Oriented to person;Oriented to place;Oriented to situation   Memory Within functional limits   Following Commands Follows all commands and directions without difficulty   Comments Pt w/ somewhat flat affect, agreeable to mobilization, pt verbalized recall of UE precautions   RUE Assessment   RUE Assessment WFL  (AROM- grossly assessed with functional mobility)   LUE Assessment   LUE Assessment WFL  (AROM- grossly assessed with functional mobility)   RLE Assessment   RLE Assessment WFL  (AROM- grossly assessed with functional mobility)   Strength RLE   RLE Overall Strength   (Fair+ grossly assessed w/ functional mobility)   LLE Assessment   LLE Assessment WFL  (AROM- grossly assessed with functional mobility)   Strength LLE   LLE Overall Strength   (Fair+ grossly assessed w/ functional mobility)   Bed Mobility   Additional Comments Unable to assess, pt OOB seated in chair upon PT arrival, pt returned to chair following assessment, PT remained in room to perform follow up treatment sesion   Transfers   Sit to Stand 4  Minimal assistance  (x1 trial)   Additional items Assist x 1; Armrests; Increased time required;Verbal cues; Other   Stand to Sit 4  Minimal assistance  (x1 trial)   Additional items Assist x 1; Armrests; Increased time required;Verbal cues; Other   Additional Comments Functional transfers performed w/ RW   Ambulation/Elevation   Gait pattern Wide BERRY; Inconsistent calvin; Foward flexed; Short stride; Excessively slow  (Increased lateral trunk sway)   Gait Assistance 4  Minimal assist   Additional items Assist x 1;Verbal cues; Tactile cues; Other (Comment)  (Stand by (A) of 3 more for lines and chair follow)   Assistive Device Rolling walker   Distance 75'   Stair Management Assistance Not tested  (Not appropriate at this time)   Balance   Static Sitting Fair +   Dynamic Sitting Fair -   Static Standing Fair -   Dynamic Standing Poor +   Ambulatory Poor +   Endurance Deficit   Endurance Deficit Yes   Endurance Deficit Description Pt verbalized fatigue following 75' ambulation, requested seated rest   Activity Tolerance   Activity Tolerance Patient limited by fatigue   Medical Staff Made Aware Co-assessment performed w/ OTR due to complexity of case and number of comorbidities   Nurse Made Aware Yes, spoke to STEPHANY Fernandes   Assessment   Prognosis Good   Problem List Decreased strength;Decreased endurance; Impaired balance;Decreased mobility;Obesity;Pain   Assessment Pt is a 62 y o  male who presented to [facility] ED with c/o CP and SOB upon exertion  Pt admitted with elevated Troponin levels and transferred to [facility] for cardiac cath  Pt w/ Dx of Left main coronary artery disease and underwent Coronary artery bypass grafting x 2 with left internal mammary artery to left anterior descending, saphenous vein graft to left posterolateral branch on 9/27/21  Pt's pertinent PMH affecting PT POC includes: HTN, Type 2 DM, Anemia, and personal factors of: RAFAEL home   Pt's current clinical presentation is unstable/unpredictable evident through telemetry monitoring, multiple lines, CT, O2 at 1 5L/min  Pt demonstrates generalized post surgical pain and weakness, impaired balance, decreased endurance and decreased activity tolerance, leading to the inability to perform independent functional transfers, and independent household/community ambulation; a functional mobility level significantly below baseline  PT will continue to follow patient, working on progressive mobilization and building activity tolerance, to address the stated impairments and functional limitations  Anticipate pt will return home, if functional mobility improves as expected and pending medical clearance  Home PT follow up is recommended at this time  Barriers to Discharge Other (Comment); Inaccessible home environment  (RAFAEL home, medical status)   Goals   Patient Goals To go home   STG Expiration Date 10/08/21   Short Term Goal #1 In 7-10 days, pt will: Pt will perform bed mobility at level of (I) in order to decrease caregiver burden  Pt will perform functional transfers at level of mod (I) in order to facilitate independence navigating through home environment and decrease caregiver burden  Pt will ambulate 300' with least restrictive AD possible at level of mod (I) in order to ambulate household and community distances independently  Pt will navigate 1 step w/ least restrictive AD possible, and w/o handrail, at level of (S), in order to access previous living environment  Pt will participate in LE ther ex and be independent with HEP in order to improve strength, therefore facilitating improvements in functional transfers and ambulation  Pt will improve ambulation balance to grade of fair w/o AD in order facilitate independence with household and community ambulation  PT Treatment Day 0   Plan   Treatment/Interventions Functional transfer training;LE strengthening/ROM; Elevations; Therapeutic exercise; Endurance training;Equipment eval/education; Bed mobility;Gait training;Spoke to nursing;OT   PT Frequency Other (Comment)  (4-6x/week)   Recommendation   PT Discharge Recommendation Home with home health rehabilitation  (Home PT At this time)   Equipment Recommended Néstor Quispe  (Pt states he has RW at home from prior surgery)   [address] 74 walker   [place] 8 in Bed Without Bedrails 2   Lying on Back to Sitting on Edge of Flat Bed 2   Moving Bed to Chair 3   Standing Up From Chair 3   Walk in Room 3   Climb 3-5 Stairs 2   Basic Mobility Inpatient Raw Score 15   Basic Mobility Standardized Score 36 97   Modified [place] Scale   Modified [place] Scale 4           Dona Niño, SPT

## 2021-09-29 LAB
ANION GAP SERPL CALCULATED.3IONS-SCNC: 4 MMOL/L (ref 4–13)
BUN SERPL-MCNC: 36 MG/DL (ref 5–25)
CALCIUM SERPL-MCNC: 7.6 MG/DL (ref 8.3–10.1)
CHLORIDE SERPL-SCNC: 108 MMOL/L (ref 100–108)
CO2 SERPL-SCNC: 24 MMOL/L (ref 21–32)
CREAT SERPL-MCNC: 1.15 MG/DL (ref 0.6–1.3)
ERYTHROCYTE [DISTWIDTH] IN BLOOD BY AUTOMATED COUNT: 13.6 % (ref 11.6–15.1)
GFR SERPL CREATININE-BSD FRML MDRD: 69 ML/MIN/1.73SQ M
GLUCOSE SERPL-MCNC: 155 MG/DL (ref 65–140)
GLUCOSE SERPL-MCNC: 157 MG/DL (ref 65–140)
GLUCOSE SERPL-MCNC: 225 MG/DL (ref 65–140)
GLUCOSE SERPL-MCNC: 242 MG/DL (ref 65–140)
GLUCOSE SERPL-MCNC: 249 MG/DL (ref 65–140)
HCT VFR BLD AUTO: 33.9 % (ref 36.5–49.3)
HGB BLD-MCNC: 10.9 G/DL (ref 12–17)
MAGNESIUM SERPL-MCNC: 2.9 MG/DL (ref 1.6–2.6)
MCH RBC QN AUTO: 29.5 PG (ref 26.8–34.3)
MCHC RBC AUTO-ENTMCNC: 32.2 G/DL (ref 31.4–37.4)
MCV RBC AUTO: 92 FL (ref 82–98)
PLATELET # BLD AUTO: 189 THOUSANDS/UL (ref 149–390)
PMV BLD AUTO: 10.7 FL (ref 8.9–12.7)
POTASSIUM SERPL-SCNC: 4.4 MMOL/L (ref 3.5–5.3)
RBC # BLD AUTO: 3.7 MILLION/UL (ref 3.88–5.62)
SODIUM SERPL-SCNC: 136 MMOL/L (ref 136–145)
WBC # BLD AUTO: 13.39 THOUSAND/UL (ref 4.31–10.16)

## 2021-09-29 PROCEDURE — 97530 THERAPEUTIC ACTIVITIES: CPT

## 2021-09-29 PROCEDURE — 85027 COMPLETE CBC AUTOMATED: CPT | Performed by: PHYSICIAN ASSISTANT

## 2021-09-29 PROCEDURE — 82948 REAGENT STRIP/BLOOD GLUCOSE: CPT

## 2021-09-29 PROCEDURE — 83735 ASSAY OF MAGNESIUM: CPT | Performed by: PHYSICIAN ASSISTANT

## 2021-09-29 PROCEDURE — 99232 SBSQ HOSP IP/OBS MODERATE 35: CPT | Performed by: INTERNAL MEDICINE

## 2021-09-29 PROCEDURE — 99024 POSTOP FOLLOW-UP VISIT: CPT | Performed by: PHYSICIAN ASSISTANT

## 2021-09-29 PROCEDURE — 97116 GAIT TRAINING THERAPY: CPT

## 2021-09-29 PROCEDURE — 80048 BASIC METABOLIC PNL TOTAL CA: CPT | Performed by: PHYSICIAN ASSISTANT

## 2021-09-29 RX ORDER — FUROSEMIDE 10 MG/ML
40 INJECTION INTRAMUSCULAR; INTRAVENOUS
Status: DISCONTINUED | OUTPATIENT
Start: 2021-09-29 | End: 2021-09-30 | Stop reason: HOSPADM

## 2021-09-29 RX ORDER — POTASSIUM CHLORIDE 20 MEQ/1
20 TABLET, EXTENDED RELEASE ORAL 2 TIMES DAILY
Status: DISCONTINUED | OUTPATIENT
Start: 2021-09-29 | End: 2021-09-30 | Stop reason: HOSPADM

## 2021-09-29 RX ADMIN — INSULIN GLARGINE 30 UNITS: 100 INJECTION, SOLUTION SUBCUTANEOUS at 21:39

## 2021-09-29 RX ADMIN — ASPIRIN 325 MG ORAL TABLET 325 MG: 325 PILL ORAL at 09:12

## 2021-09-29 RX ADMIN — FUROSEMIDE 40 MG: 10 INJECTION, SOLUTION INTRAMUSCULAR; INTRAVENOUS at 15:56

## 2021-09-29 RX ADMIN — AMIODARONE HYDROCHLORIDE 200 MG: 200 TABLET ORAL at 05:47

## 2021-09-29 RX ADMIN — CHLORHEXIDINE GLUCONATE 15 ML: 1.2 SOLUTION ORAL at 17:35

## 2021-09-29 RX ADMIN — DOCUSATE SODIUM 100 MG: 100 CAPSULE ORAL at 17:35

## 2021-09-29 RX ADMIN — POTASSIUM CHLORIDE 20 MEQ: 1500 TABLET, EXTENDED RELEASE ORAL at 17:35

## 2021-09-29 RX ADMIN — INSULIN LISPRO 2 UNITS: 100 INJECTION, SOLUTION INTRAVENOUS; SUBCUTANEOUS at 12:42

## 2021-09-29 RX ADMIN — FUROSEMIDE 40 MG: 10 INJECTION, SOLUTION INTRAMUSCULAR; INTRAVENOUS at 09:13

## 2021-09-29 RX ADMIN — FONDAPARINUX SODIUM 2.5 MG: 2.5 INJECTION, SOLUTION SUBCUTANEOUS at 09:13

## 2021-09-29 RX ADMIN — INSULIN LISPRO 1 UNITS: 100 INJECTION, SOLUTION INTRAVENOUS; SUBCUTANEOUS at 06:02

## 2021-09-29 RX ADMIN — CHLORHEXIDINE GLUCONATE 15 ML: 1.2 SOLUTION ORAL at 09:13

## 2021-09-29 RX ADMIN — AMIODARONE HYDROCHLORIDE 200 MG: 200 TABLET ORAL at 14:16

## 2021-09-29 RX ADMIN — OXYCODONE HYDROCHLORIDE 5 MG: 5 TABLET ORAL at 04:06

## 2021-09-29 RX ADMIN — ACETAMINOPHEN 975 MG: 325 TABLET, FILM COATED ORAL at 12:42

## 2021-09-29 RX ADMIN — METOPROLOL TARTRATE 25 MG: 25 TABLET, FILM COATED ORAL at 09:12

## 2021-09-29 RX ADMIN — OXYCODONE HYDROCHLORIDE 5 MG: 5 TABLET ORAL at 10:35

## 2021-09-29 RX ADMIN — MUPIROCIN 1 APPLICATION.: 20 OINTMENT TOPICAL at 09:13

## 2021-09-29 RX ADMIN — INSULIN LISPRO 5 UNITS: 100 INJECTION, SOLUTION INTRAVENOUS; SUBCUTANEOUS at 17:35

## 2021-09-29 RX ADMIN — POLYETHYLENE GLYCOL 3350 17 G: 17 POWDER, FOR SOLUTION ORAL at 09:12

## 2021-09-29 RX ADMIN — PANTOPRAZOLE SODIUM 40 MG: 40 TABLET, DELAYED RELEASE ORAL at 09:12

## 2021-09-29 RX ADMIN — AMIODARONE HYDROCHLORIDE 200 MG: 200 TABLET ORAL at 21:39

## 2021-09-29 RX ADMIN — ATORVASTATIN CALCIUM 80 MG: 80 TABLET, FILM COATED ORAL at 15:56

## 2021-09-29 RX ADMIN — ACETAMINOPHEN 975 MG: 325 TABLET, FILM COATED ORAL at 04:06

## 2021-09-29 RX ADMIN — ACETAMINOPHEN 975 MG: 325 TABLET, FILM COATED ORAL at 21:38

## 2021-09-29 RX ADMIN — DOCUSATE SODIUM 100 MG: 100 CAPSULE ORAL at 09:12

## 2021-09-29 RX ADMIN — Medication 1000 UNITS: at 09:12

## 2021-09-29 RX ADMIN — POTASSIUM CHLORIDE 20 MEQ: 1500 TABLET, EXTENDED RELEASE ORAL at 09:12

## 2021-09-29 RX ADMIN — METOPROLOL TARTRATE 25 MG: 25 TABLET, FILM COATED ORAL at 21:38

## 2021-09-29 RX ADMIN — INSULIN LISPRO 2 UNITS: 100 INJECTION, SOLUTION INTRAVENOUS; SUBCUTANEOUS at 17:35

## 2021-09-29 NOTE — PROCEDURES
09/29/21    Procedure: Epicardial Pacing Wire removal    Rosa Gerber was returned to bed and informed of mandatory one hour post-procedure bed rest   The assigned nurse was notified  Epicardial pacing wires removed in routine fashion, without incident  The patient tolerated the procedure well  Vital signs ordered  q 15 minutes for one hour, as per protocol  09/29/21    Procedure: Chest tube removal    Chest tubes removed in routine fashion without incident  Insertion site dressed with Acticoat  Rosa Zabrina tolerated the procedure well  Nurse notified      SIGNATURE: Alana Carrillo PA-C  DATE: September 29, 2021  TIME: 11:01 AM

## 2021-09-29 NOTE — PHYSICAL THERAPY NOTE
PHYSICAL THERAPY NOTE          Patient Name: Yakelin Vasques  YGKRD'C Date: 9/29/2021 09/29/21 1343   PT Last Visit   PT Visit Date 09/29/21   Note Type   Note Type Treatment   Pain Assessment   Pain Assessment Tool Pain Assessment not indicated - pt denies pain   Pain Score No Pain   Restrictions/Precautions   Other Precautions Cardiac/sternal;Telemetry   General   Chart Reviewed Yes   Additional Pertinent History cleared for Tx session (spoke to mary)   Response to Previous Treatment Patient with no complaints from previous session  Cognition   Overall Cognitive Status WFL   Arousal/Participation Alert; Cooperative   Attention Within functional limits   Orientation Level Oriented to person;Oriented to place;Oriented to situation   Memory Within functional limits   Following Commands Follows all commands and directions without difficulty   Subjective   Subjective Alert; reclined in the chair; reports he recently walked w/ staff w/o AD; agreeable to mobilize and try a step; denies the need for SPC or rw;   Transfers   Sit to Stand 6  Modified independent   Stand to Sit 6  Modified independent   Ambulation/Elevation   Gait pattern Wide BERRY;Steppage; Excessively slow  (no overt LOB or knee buckling)   Gait Assistance 6  Modified independent   Assistive Device None   Distance 2 x 60 ft w/ step negotiation in between   Stair Management Assistance 6  Modified independent   Stair Management Technique One rail R;Step to pattern; Foreward;Backward;Nonreciprocal   Number of Stairs 1   Balance   Static Sitting Good   Static Standing Fair +   Ambulatory Fair   Activity Tolerance   Activity Tolerance Patient tolerated treatment well   Nurse Made Aware spoke to STEPHANY Heredia   Exercises   Knee AROM Long Arc Quad Sitting;15 reps;AROM; Bilateral  (HEP)   Ankle Pumps Sitting;15 reps;AROM; Bilateral  (HEP)   Marching Sitting;15 reps;AROM; Bilateral  (HEP)   Equipment Use   Comments Pt was reclined in the chair w/ LE elevated at the end of session; (R) SCD back on; call bell w/in reachPt was reclined in the chair w/ LE elevated at the end of session; (R) SCD back on; call bell w/in reach   Assessment   Assessment Pt demonstrated overall improvement in balance, endurance and all aspects of observed mobility progressing to (I)/mod (I) level on level surfaces (incl amb w/o AD) and on the step; no overt uncorrected LOB, gross knee buckling, or excessive swaying observed during the session; no increased discomfort or excessive fatigue expressed; LE therex/HEP reviewed and performed actively; pt appeared to be comfortable at the end of session/reclined; overall, cont to anticipate pt will return home w/ available family support upon D/C from PT/mobility stand point and when medically cleared; home PT follow up is recommended; no other immediate PT needs otherwise identified while in the hospital; pt informed and concurred; pt expressed no concerns about going home from mobility stand point, when medically cleared; will therefore sign off; RN informed     Barriers to Discharge None   Goals   Patient Goals to go home   PT Treatment Day 2   Plan   Treatment/Interventions Spoke to nursing;Spoke to case management;OT   Progress Discontinue PT   PT Frequency Other (Comment)  (D/C PT)   Recommendation   PT Discharge Recommendation Home with home health rehabilitation  (home PT)   Equipment Recommended Other (Comment)  (none)   Walker Package Recommended   (none)   AM-PAC Basic Mobility Inpatient   Turning in Bed Without Bedrails 4   Lying on Back to Sitting on Edge of Flat Bed 4   Moving Bed to Chair 4   Standing Up From Chair 4   Walk in Room 4   Climb 3-5 Stairs 4   Basic Mobility Inpatient Raw Score 24   Basic Mobility Standardized Score 57 68       Theresa Coburn, PT

## 2021-09-29 NOTE — PLAN OF CARE
Problem: PHYSICAL THERAPY ADULT  Goal: Performs mobility at highest level of function for planned discharge setting  See evaluation for individualized goals  Description: Treatment/Interventions: Functional transfer training, LE strengthening/ROM, Elevations, Therapeutic exercise, Endurance training, Equipment eval/education, Bed mobility, Gait training, Spoke to nursing  Equipment Recommended: Elijah Mello (at this time)       See flowsheet documentation for full assessment, interventions and recommendations  Outcome: Adequate for Discharge  Note: Prognosis: Good  Problem List: Decreased strength, Decreased endurance, Impaired balance, Decreased mobility  Assessment: Pt demonstrated overall improvement in balance, endurance and all aspects of observed mobility progressing to (I)/mod (I) level on level surfaces (incl amb w/o AD) and on the step; no overt uncorrected LOB, gross knee buckling, or excessive swaying observed during the session; no increased discomfort or excessive fatigue expressed; LE therex/HEP reviewed and performed actively; pt appeared to be comfortable at the end of session/reclined; overall, cont to anticipate pt will return home w/ available family support upon D/C from PT/mobility stand point and when medically cleared; home PT follow up is recommended; no other immediate PT needs otherwise identified while in the hospital; pt informed and concurred; pt expressed no concerns about going home from mobility stand point, when medically cleared; will therefore sign off; RN informed  Barriers to Discharge: None        PT Discharge Recommendation: Home with home health rehabilitation (home PT)          See flowsheet documentation for full assessment

## 2021-09-29 NOTE — PROGRESS NOTES
"Progress Note [facility] 62 y o  male MRN: [id number]    Unit/Bed#: [facility] 409-01 Encounter: [id number]      CC: diabetes f/u    Subjective: This is a 62y o -year-old male with pmhx of DM type 2, CAD s/p CABG, HTN, HLD, Obesity, cardiomyopathy admitted to the Hospital for CAD  Endocrinology consulted x DM type 2 management  Objective:     Vitals: Blood pressure 111/71, pulse 77, temperature 98 3 °F (36 8 °C), temperature source Oral, resp  rate 18, height 5' 7\" (1 702 m), weight 95 7 kg (211 lb), SpO2 93 %  ,Body mass index is 33 05 kg/m²  Intake/Output Summary (Last 24 hours) at 9/29/2021 1708  Last data filed at 9/29/2021 1034  Gross per 24 hour   Intake 540 ml   Output 1620 ml   Net -1080 ml       Physical Exam:  General Appearance: awake, appears stated age and cooperative  Head: Normocephalic, without obvious abnormality, atraumatic  Extremities: moves all extremities  Skin: Skin color and temperature normal    Pulm: no labored breathing    POC Glucose (mg/dl)   Date Value   09/29/2021 249 (H)   09/29/2021 242 (H)   09/29/2021 157 (H)   09/28/2021 165 (H)   09/28/2021 171 (H)   09/28/2021 168 (H)   09/28/2021 228 (H)   09/28/2021 242 (H)   09/28/2021 226 (H)   09/28/2021 188 (H)     Assessment: This is a 62y o -year-old male with pmhx of DM type 2, CAD s/p CABG, HTN, HLD, Obesity, cardiomyopathy admitted to the Hospital for CAD  Endocrinology consulted x DM type 2 management       Plan:     - pt feeling ok able to tolerate diet, will continue with insulin gtt for today  -Continue with Lantus 30 tonight,   -Continue with correctional insulin tomorrow  -Will start Lispro 5 units TID with meals    Portions of the record may have been created with voice recognition software     "

## 2021-09-29 NOTE — PROGRESS NOTES
Progress Note - Cardiothoracic Surgery   Lamin Taylor 62 y o  male MRN: [id number]  Unit/Bed#: [facility] 409-01 Encounter: [id number]    Coronary artery disease  S/P coronary artery bypass grafting; POD # 2      24 Hour Events: Transferred from ICU to telemetry  Yesterday, given additional Lasix 40mg IV x1 with good response       Medications:   Scheduled Meds:  Current Facility-Administered Medications   Medication Dose Route Frequency Provider Last Rate   • acetaminophen  975 mg Oral Q8H Betty Alvarez PA-C     • amiodarone  200 mg Oral [facility] Betty Alvarez PA-C     • aspirin  325 mg Oral Daily Betty Alvarez PA-C     • atorvastatin  80 mg Oral Daily With Dinner Betty Alvarez PA-C     • bisacodyl  10 mg Rectal Daily PRN Betty Alvarez PA-C     • chlorhexidine  15 mL Mouth/Throat BID Betty Alvarez PA-C     • cholecalciferol  1,000 Units Oral Daily Betty Alvarez PA-C     • docusate sodium  100 mg Oral BID Betty Alvarez PA-C     • fondaparinux  2 5 mg Subcutaneous Daily Betty Alvarez PA-C     • furosemide  40 mg Intravenous Daily Betty Alvarez PA-C     • insulin glargine  30 Units Subcutaneous HS Geri Jefferson MD     • insulin lispro  1-5 Units Subcutaneous TID AC Geri Jefferson MD     • lidocaine (cardiac)  100 mg Intravenous Q30 Min PRN Betty Alvarez PA-C     • metoprolol tartrate  12 5 mg Oral Q12H [facility] Betty Alvarez PA-C     • mupirocin  1 application Nasal B61A [facility] Betty Alvarez PA-C     • ondansetron  4 mg Intravenous Q6H PRN Betty Alvarez PA-C     • oxyCODONE  2 5 mg Oral Q4H PRN Betty Alvarez PA-C     • oxyCODONE  5 mg Oral Q4H PRN Betty Alvarez PA-C     • pantoprazole  40 mg Oral Daily Betty Alvarez PA-C     • polyethylene glycol  17 g Oral Daily Betty Alvarez PA-C     • potassium chloride  20 mEq Oral Daily Betty Alvarez PA-C     • sodium chloride  20 mL/hr Intravenous Continuous Betty Alvarez PA-C Stopped (09/28/21 2028)   • temazepam  15 mg Oral HS PRN Kasandra Dakins, PA-C       Continuous Infusions:sodium chloride, 20 mL/hr, Last Rate: Stopped (09/28/21 0430)      PRN Meds: bisacodyl  •  lidocaine (cardiac)  •  ondansetron  •  oxyCODONE  •  oxyCODONE  •  temazepam    Vitals:   Vitals:    09/28/21 2227 09/29/21 0357 09/29/21 0600 09/29/21 0700   BP: 99/63 111/75  115/64   BP Location: Left arm Left arm  Left arm   Pulse: 85 84  91   Resp: 18 20  20   Temp: 99 4 °F (37 4 °C) 98 °F (36 7 °C)  98 1 °F (36 7 °C)   TempSrc: Oral Oral  Oral   SpO2: 98% 94%  94%   Weight:   95 7 kg (211 lb)    Height:           Telemetry: NSR; Heart Rate: 97    Respiratory:   SpO2: SpO2: 94 %, SpO2 Activity: SpO2 Activity: At Rest; Room Air    Intake/Output:     Intake/Output Summary (Last 24 hours) at 9/29/2021 0828  Last data filed at 9/29/2021 8434  Gross per 24 hour   Intake 438 ml   Output 1210 ml   Net -772 ml        Chest tube Output:    Mediastinal tubes: 80 mL/8 hours  180 mL/24 hours   Pleural tubes: 90 mL/8 hours  90 mL/24 hours     Weights:   Weight (last 2 days)     Date/Time   Weight    09/29/21 0600   95 7 (211)    09/28/21 0551   95 2 (209 8)    09/27/21 0609   92 4 (203 6)                Results:   Results from last 7 days   Lab Units 09/29/21  0547 09/28/21  0334 09/27/21 1955 09/27/21  1240 09/24/21  0303   WBC Thousand/uL 13 39* 20 26*  --   --  7 41   HEMOGLOBIN g/dL 10 9* 12 6 13 1 13 0 11 9*   HEMATOCRIT % 33 9* 39 1 39 3 39 9 36 5   PLATELETS Thousands/uL 189 258  --  198 262     Results from last 7 days   Lab Units 09/29/21  0547 09/28/21  0334 09/27/21 1955 09/27/21  1244 09/27/21  1240 09/27/21  1145   SODIUM mmol/L 136 138  --   --  140  --    POTASSIUM mmol/L 4 4 4 0 4 2  --  4 0   < >   CHLORIDE mmol/L 108 110*  --   --  113*  --    CO2 mmol/L 24 21  --   --  23  --    CO2, I-STAT mmol/L  --   --   --  23  --   --    BUN mg/dL 36* 23  --   --  18  --    CREATININE mg/dL 1 15 0 98  --   --  0 82  --    GLUCOSE, ISTAT mg/dl  -- --   --  139  --   --    CALCIUM mg/dL 7 6* 7 8*  --   --  8 0*  --     < > = values in this interval not displayed  Results from last 7 days   Lab Units 09/27/21  1240 09/27/21  0424 09/26/21  1944 09/25/21  0103   INR  1 26*  --   --  1 11   PTT seconds 32 >210* 63*  --      Point of care glucose: 155-157    Studies:  No new studies      Invasive Lines/Tubes:  Invasive Devices     Central Venous Catheter Line            CVC Central Lines 09/27/21 Triple 2 days          Peripheral Intravenous Line            Peripheral IV 09/27/21 Right Antecubital 2 days          Line            Pacer Wires 1 day    Pacer Wires 1 day          Drain            Chest Tube 1 Left Pleural 32 Fr  1 day    Chest Tube 2 Posterior Mediastinal 32 Fr  1 day    Chest Tube 3 Anterior Mediastinal 32 Fr  1 day                Physical Exam:    HEENT/NECK:  Normocephalic  Atraumatic  No jugular venous distention  Cardiac: Regular rate and rhythm  Pulmonary:  Breath sounds clear bilaterally  Abdomen:  Non-tender and Non-distended  Incisions: Sternum is stable  Incision dressed with Acticoat  No erythema or drainage and Saphenectomy incision dressed with Acticoat  No erythema or drainage  Extremities: Extremities warm/dry and 1+ edema B/L  Neuro: Alert and oriented X 3 and Sensation is grossly intact  Skin: Warm/Dry, without rashes or lesions  Assessment:  Principal Problem:    ACS (acute coronary syndrome) (HCC)  Active Problems:    Type 2 diabetes mellitus with diabetic polyneuropathy, without long-term current use of insulin (HCC)    Essential hypertension    Anemia    Hyperlipidemia    CAD (coronary artery disease)    S/P CABG x 2       Coronary artery disease  S/P coronary artery bypass grafting; POD # 2    Plan:    1  Cardiac:   NSR; Tachycardic  Increase to Lopressor, 25mg PO BID  Continue ASA and Statin therapy  Epicardial pacing wires no longer required    Remove today  Maintain central IV access today   Continue DVT prophylaxis    2  Pulmonary:   Good Room air oxygen saturation; Continue incentive spirometry/Coughing/Deep breathing exercises  Chest tube drainage diminished; D/C today    3  Renal:   Intake/Output net: - 852 mL/24 hours  Diuretic Regimen:  Increase to Lasix 40 mg IV BID  Increase to Potassium Chloride 20 mEq PO BID  Post op Creatinine stable; Follow up labs prn    4  Neuro:  Neurologically intact; No active issues  Incisional pain well-controlled  Continue Tylenol, 975 mg PO q 8, standing dose  Continue Oxycodone, 2 5 to 5 mg PO q 4 hours prn pain    5  GI:  Tolerating TLC 2 3 gm sodium diet  Maintain 1800 mL daily fluid restriction   Continue stool softeners and prn suppository  Continue GI prophylaxis    6  Endo:   History of diabetes; Continue SQ insulin therapy as directed by endocrinology physician  Insulin administration teaching for home therapy ordered    7    Hematology:    Post-operative blood count acceptable; Trend prn    8     Disposition:      Ambulating independently, Anticipate discharge to home 24-48 hours     VTE Pharmacologic Prophylaxis: Sequential compression device (Venodyne)  and Fondaparinux (Arixtra)  VTE Mechanical Prophylaxis: sequential compression device    Collaborative rounds completed with supervising physician  Plan of care discussed with bedside nurse    SIGNATURE: Aiden Rodriguez PA-C  DATE: September 29, 2021  TIME: 8:28 AM

## 2021-09-30 VITALS
BODY MASS INDEX: 32.49 KG/M2 | SYSTOLIC BLOOD PRESSURE: 93 MMHG | WEIGHT: 207 LBS | OXYGEN SATURATION: 94 % | HEART RATE: 63 BPM | TEMPERATURE: 98 F | DIASTOLIC BLOOD PRESSURE: 61 MMHG | RESPIRATION RATE: 18 BRPM | HEIGHT: 67 IN

## 2021-09-30 PROBLEM — D72.829 LEUKOCYTOSIS: Status: ACTIVE | Noted: 2021-09-30

## 2021-09-30 LAB
GLUCOSE SERPL-MCNC: 131 MG/DL (ref 65–140)
GLUCOSE SERPL-MCNC: 243 MG/DL (ref 65–140)

## 2021-09-30 PROCEDURE — 99024 POSTOP FOLLOW-UP VISIT: CPT | Performed by: PHYSICIAN ASSISTANT

## 2021-09-30 PROCEDURE — 99024 POSTOP FOLLOW-UP VISIT: CPT | Performed by: THORACIC SURGERY (CARDIOTHORACIC VASCULAR SURGERY)

## 2021-09-30 PROCEDURE — 97530 THERAPEUTIC ACTIVITIES: CPT

## 2021-09-30 PROCEDURE — 82948 REAGENT STRIP/BLOOD GLUCOSE: CPT

## 2021-09-30 RX ORDER — ASPIRIN 325 MG
325 TABLET ORAL DAILY
Qty: 90 TABLET | Refills: 0 | Status: SHIPPED | OUTPATIENT
Start: 2021-10-01 | End: 2023-01-24

## 2021-09-30 RX ORDER — ROSUVASTATIN CALCIUM 40 MG/1
40 TABLET, COATED ORAL DAILY
Qty: 90 TABLET | Refills: 0 | Status: SHIPPED | OUTPATIENT
Start: 2021-09-30 | End: 2021-12-07 | Stop reason: SDUPTHER

## 2021-09-30 RX ORDER — BISACODYL 5 MG/1
10 TABLET, DELAYED RELEASE ORAL DAILY PRN
Qty: 60 TABLET | Refills: 0 | Status: SHIPPED | OUTPATIENT
Start: 2021-09-30 | End: 2021-10-30

## 2021-09-30 RX ORDER — OXYCODONE HYDROCHLORIDE 5 MG/1
TABLET ORAL
Qty: 30 TABLET | Refills: 0 | Status: SHIPPED | OUTPATIENT
Start: 2021-09-30 | End: 2021-12-07

## 2021-09-30 RX ORDER — POLYETHYLENE GLYCOL 3350 17 G/17G
17 POWDER, FOR SOLUTION ORAL DAILY
Qty: 510 G | Refills: 0 | Status: SHIPPED | OUTPATIENT
Start: 2021-09-30 | End: 2021-12-07

## 2021-09-30 RX ORDER — POTASSIUM CHLORIDE 20 MEQ/1
20 TABLET, EXTENDED RELEASE ORAL 2 TIMES DAILY
Qty: 14 TABLET | Refills: 0 | Status: SHIPPED | OUTPATIENT
Start: 2021-09-30 | End: 2021-10-11 | Stop reason: CLARIF

## 2021-09-30 RX ORDER — PANTOPRAZOLE SODIUM 20 MG/1
20 TABLET, DELAYED RELEASE ORAL DAILY
Qty: 30 TABLET | Refills: 0 | Status: SHIPPED | OUTPATIENT
Start: 2021-09-30 | End: 2021-12-07 | Stop reason: HOSPADM

## 2021-09-30 RX ORDER — TORSEMIDE 20 MG/1
20 TABLET ORAL DAILY
Qty: 7 TABLET | Refills: 0 | Status: SHIPPED | OUTPATIENT
Start: 2021-09-30 | End: 2021-10-11 | Stop reason: CLARIF

## 2021-09-30 RX ORDER — ACETAMINOPHEN 325 MG/1
TABLET ORAL
Qty: 90 TABLET | Refills: 0 | Status: SHIPPED | OUTPATIENT
Start: 2021-09-30

## 2021-09-30 RX ADMIN — FONDAPARINUX SODIUM 2.5 MG: 2.5 INJECTION, SOLUTION SUBCUTANEOUS at 08:30

## 2021-09-30 RX ADMIN — POTASSIUM CHLORIDE 20 MEQ: 1500 TABLET, EXTENDED RELEASE ORAL at 08:27

## 2021-09-30 RX ADMIN — INSULIN LISPRO 5 UNITS: 100 INJECTION, SOLUTION INTRAVENOUS; SUBCUTANEOUS at 11:36

## 2021-09-30 RX ADMIN — PANTOPRAZOLE SODIUM 40 MG: 40 TABLET, DELAYED RELEASE ORAL at 08:28

## 2021-09-30 RX ADMIN — MUPIROCIN 1 APPLICATION.: 20 OINTMENT TOPICAL at 08:31

## 2021-09-30 RX ADMIN — DOCUSATE SODIUM 100 MG: 100 CAPSULE ORAL at 08:30

## 2021-09-30 RX ADMIN — ASPIRIN 325 MG ORAL TABLET 325 MG: 325 PILL ORAL at 08:28

## 2021-09-30 RX ADMIN — CHLORHEXIDINE GLUCONATE 15 ML: 1.2 SOLUTION ORAL at 08:28

## 2021-09-30 RX ADMIN — ACETAMINOPHEN 975 MG: 325 TABLET, FILM COATED ORAL at 05:16

## 2021-09-30 RX ADMIN — Medication 1000 UNITS: at 08:27

## 2021-09-30 RX ADMIN — METOPROLOL TARTRATE 25 MG: 25 TABLET, FILM COATED ORAL at 08:28

## 2021-09-30 RX ADMIN — FUROSEMIDE 40 MG: 10 INJECTION, SOLUTION INTRAMUSCULAR; INTRAVENOUS at 08:31

## 2021-09-30 RX ADMIN — INSULIN LISPRO 5 UNITS: 100 INJECTION, SOLUTION INTRAVENOUS; SUBCUTANEOUS at 08:31

## 2021-09-30 RX ADMIN — AMIODARONE HYDROCHLORIDE 200 MG: 200 TABLET ORAL at 05:16

## 2021-09-30 RX ADMIN — INSULIN LISPRO 2 UNITS: 100 INJECTION, SOLUTION INTRAVENOUS; SUBCUTANEOUS at 11:10

## 2021-09-30 RX ADMIN — POLYETHYLENE GLYCOL 3350 17 G: 17 POWDER, FOR SOLUTION ORAL at 08:28

## 2021-09-30 NOTE — OCCUPATIONAL THERAPY NOTE
Occupational Therapy Progress Note     Patient Name: Mikey Lynn  SZQUD'H Date: 9/30/2021  Problem List  Principal Problem:    ACS (acute coronary syndrome) [facility])  Active Problems:    Type 2 diabetes mellitus with diabetic polyneuropathy, without long-term current use of insulin (HCC)    Essential hypertension    Anemia    Hyperlipidemia    CAD (coronary artery disease)    S/P CABG x 2    Leukocytosis              09/30/21 1004   OT Last Visit   OT Visit Date 09/30/21   Note Type   Note Type Treatment   Restrictions/Precautions   Other Precautions Cardiac/sternal;Telemetry   General   Response to Previous Treatment Patient with no complaints from previous session   Lifestyle   Autonomy Pt lives in a single story home, + drives, reports no AD PTA    Reciprocal Relationships Pt lives w his wife and has two sons who are able to assist him as needed   Service to Others Pt is employed part time as a    Intrinsic Gratification Pt enjoys driving ATVs, snowmobiles, etc     Pain Assessment   Pain Assessment Tool 0-10   Pain Score No Pain   ADL   Where Assessed Chair   LB Dressing Assistance 4  Minimal Assistance   LB Dressing Deficit Don/doff R sock; Don/doff L sock   Transfers   Sit to Stand 6  Modified independent   Stand to Sit 6  Modified independent   Functional Mobility   Functional Mobility 6  Modified independent   Additional Comments Pt demonstrated household mobility with no device or rest breaks  Cognition   Overall Cognitive Status WFL   Arousal/Participation Alert; Cooperative   Attention Within functional limits   Orientation Level Oriented X4   Memory Within functional limits   Following Commands Follows all commands and directions without difficulty   Comments Pt is pleasant and cooperative to work with therapy this date      Activity Tolerance   Activity Tolerance Patient tolerated treatment well   Assessment   Assessment Patient participated in Skilled OT session this date with interventions consisting of ADL re training with the use of correct body mechanics, maintaining sternal precautions and  therapeutic activities to: increase activity tolerance   Patient agreeable to OT treatment session, upon arrival patient was found seated OOB to Chair  In comparison to previous session, patient with improvements in activity tolerance  Pt demonstrates good carry over of cardiac/ sternal precautions throughout session  From OT standpoint, recommendation at time of d/c would be Home with family support  Pt reports that he has no concerns about returning home, and that his wife will be home to assist as needed upon d/c  Recommend continued participation in [facility] and functional mobility with staff  No further acute OT needs, d/c OT  Plan   Treatment Interventions ADL retraining; Endurance training;Functional transfer training;Cardiac education   Goal Expiration Date 10/12/21   OT Treatment Day 1   OT Frequency 3-5x/wk   Recommendation   OT Discharge Recommendation No rehabilitation needs  (home with increased social support)   OT - OK to Discharge Yes  (When medically appropriate)   AM-PAC Daily Activity Inpatient   Lower Body Dressing 3   Bathing 3   Toileting 4   Upper Body Dressing 4   Grooming 4   Eating 4   Daily Activity Raw Score 22   Daily Activity Standardized Score (Calc for Raw Score >=11) 47  1   AM-PAC Applied Cognition Inpatient   Following a Speech/Presentation 4   Understanding Ordinary Conversation 4   Taking Medications 4   Remembering Where Things Are Placed or Put Away 4   Remembering List of 4-5 Errands 4   Taking Care of Complicated Tasks 4   Applied Cognition Raw Score 24   Applied Cognition Standardized Score 62 21   Modified Manan Scale   Modified [place] Scale 4       Viviana Quiñonez, MOT, OTR/L

## 2021-09-30 NOTE — PLAN OF CARE
Problem: CARDIOVASCULAR - ADULT  Goal: Maintains optimal cardiac output and hemodynamic stability  Description: INTERVENTIONS:  - Monitor I/O, vital signs and rhythm  - Monitor for S/S and trends of decreased cardiac output  - Administer and titrate ordered vasoactive medications to optimize hemodynamic stability  - Assess quality of pulses, skin color and temperature  - Assess for signs of decreased coronary artery perfusion  - Instruct patient to report change in severity of symptoms  Outcome: Adequate for Discharge  Goal: Absence of cardiac dysrhythmias or at baseline rhythm  Description: INTERVENTIONS:  - Continuous cardiac monitoring, vital signs, obtain 12 lead EKG if ordered  - Administer antiarrhythmic and heart rate control medications as ordered  - Monitor electrolytes and administer replacement therapy as ordered  Outcome: Adequate for Discharge

## 2021-09-30 NOTE — DISCHARGE SUMMARY
Discharge Summary - Cardiothoracic Surgery   Ty Moreno 62 y o  male MRN: [id number]  Unit/Bed#: [facility] 409-01 Encounter: [id number]    Admission Date: 9/23/2021     Discharge Date: 09/30/21    Admitting Diagnosis: ACS (acute coronary syndrome) [facility]) [I24 9]    Primary Discharge Diagnosis:   Coronary artery disease  S/P coronary artery bypass grafting;    Secondary Discharge Diagnosis:   CAD, HTN, HLD, obesity, IDDM2, CM (EF 38%), vitamin D deficiency, obesity (BMI 32 22)     Attending: LORI Gates  Consulting Physician(s):   Cardiology  Medical/Critical Care  Endocrine    Procedures Performed:   9/23: Cardiac Cath    9/27: CORONARY ARTERY BYPASS GRAFT (CABG) 2 VESSELS MALKA to LAD -SVG--> L Posterior lateral, EVH from Left leg; TRANSESOPHAGEAL ECHOCARDIOGRAM (JAE)     Hospital Course: The patient was seen in consultation prior to this admission for evaluation of Coronary artery disease  Risks and benefits of coronary artery bypass grafting were discussed in detail, and patient was agreeable  Routine preoperative evaluation was completed and informed consent was obtained prior to admission  9/23: Presented to [address] [place] [address] on 9/22 with 2-3 weeks of SOB and URI (cough/rhinorrhea)  Patient with non-specific ST changes on ECG  Initial labs with + troponin  Cardiology consulted  Loaded with brilinta in anticipation of stent  Cath performed on 9/23 and showed MV CAD with distal LM disease  Patient arranged for transfer to [facility] for surgical evaluation  (+) Brilinta 180mg 9/22 & 90mg 9/23 9/24: Seen in consultation  Pre-op orders placed  9/26: No issues overnight  Preop orders checked and satisfactory  Consent obtained  For OR tomorrow  9/27: CABG x2  Transferred to ICU supported with braydon @ 20  No significant postoperative bleeding  Wean towards extubation  EKG with Sinus bradycardia at 52  Neurovascular intact and extubated  9/28: Extubated to Professor Blanco 108, wean as tolerated   Given 1L LR & 250cc albumin for hypotension, started on braydon, weaned to off at 0530, start Lopressor 12 5mg BID, discontinue a line  Delined  Start Lasix 40mg IV QDay, discontinue faust catheter  Maintain insulin gtt, consult endocrinology  WBC 20 26, afebrile, encourage IS use  Transfer to [facility]  PM: Marginal response to AM Lasix, bp 90-100s, maintain faust, CVP 11, given additional Lasix 40mg IV x1 with good response  To transition off insulin gtt this evening to sub cu dosing      9/29: Transferred from ICU to Critical access hospital  D/C chest tubes and pacing wires  Tachycardic; Increase Lopressor 25 BID  Intake/Output net: - 852 mL/24 hours; Increase to Lasix 40 mg IV BID      9/30: Glucose well controlled, cleared to resume home regimen per endo  Ambulating independently  In NSR with stable hemodynamics  Patient desires to go home today  Condition at Discharge:   good     Discharge Physical Exam:    Please see the documented physical exam from this morning's progress note for details  Discharge Data:  Results from last 7 days   Lab Units 09/29/21  0547 09/28/21  0334 09/27/21 1955 09/27/21  1240 09/24/21  0303   WBC Thousand/uL 13 39* 20 26*  --   --  7 41   HEMOGLOBIN g/dL 10 9* 12 6 13 1 13 0 11 9*   HEMATOCRIT % 33 9* 39 1 39 3 39 9 36 5   PLATELETS Thousands/uL 189 258  --  198 262     Results from last 7 days   Lab Units 09/29/21  0547 09/28/21  0334 09/27/21 1955 09/27/21 1244 09/27/21  1240 09/27/21  1145   POTASSIUM mmol/L 4 4 4 0 4 2  --  4 0   < >   CHLORIDE mmol/L 108 110*  --   --  113*  --    CO2 mmol/L 24 21  --   --  23  --    CO2, I-STAT mmol/L  --   --   --  23  --   --    BUN mg/dL 36* 23  --   --  18  --    CREATININE mg/dL 1 15 0 98  --   --  0 82  --    GLUCOSE, ISTAT mg/dl  --   --   --  139  --   --    CALCIUM mg/dL 7 6* 7 8*  --   --  8 0*  --     < > = values in this interval not displayed       Results from last 7 days   Lab Units 09/27/21  1240 09/27/21  0424 09/26/21  1944 09/25/21  0103   INR 1 26*  --   --  1 11   PTT seconds 32 >210* 63*  --        Discharge instructions/Information to patient and family:   See after visit summary for information provided to patient and family  Arnie Denis was educated on restrictions regarding driving and lifting, and techniques of proper incisional care  They were specifically counselled on signs and symptoms of an incisional infection, and advised to contact our service immediately should they develop fevers, sweats, chill, redness or drainage at the site of any incisions  Provisions for Follow-Up Care:  See after visit summary for information related to follow-up care and any pertinent home health orders  Disposition:  Home    Planned Readmission:   No    Discharge Medications:  See after visit summary for reconciled discharge medications provided to patient and family  Arnie Denis was provided contact information and scheduled a follow up appointment with LORI Marsh  Additionally, follow up appointments have been scheduled for their primary care physician and primary cardiologist   Contact information was provided  Upon admission, troponins were Positive for NSTEMI, with level > 0 02  Arnie Denis was counseled on the importance of avoiding tobacco products  As with all patients whom have undergone open heart surgery, tobacco cessation medication was contraindicated at the time of discharge  ACE/ARB was Contraindicated secondary to hypotension    Beta Blocker was Prescribed at discharge      The patient was discharged on ongoing diuretic therapy with Torsemide 20 mg, PO QD and Potassium Chloride 20 mEq, PO QD  They were advised to continue these medications for 7 days, unless otherwise directed  Narcotic pain medication was prescribed in the form of oxycodone    Prior to prescribing, their prescription profile was reviewed on the [facility] of health prescription drug monitoring program     The patient was informed that following their postoperative surgical evaluation, they will be referred to outpatient cardiac rehabilitation  They were counseled that this program is run by specialists who will help them safely strengthen their heart and prevent more heart disease  Cardiac rehabilitation will include exercise, relaxation, stress management, and heart-healthy nutrition  Caregivers will also check to make sure their medication regimen is working  During this admission, the patient was questioned on their use of tobacco, alcohol, and illicit/non-prescription drug use in the  previous 24 months  Mikeycami Lynn states that they have not used any of these substances in this time frame  I spent 30 minutes discharging the patient  This time was spent on the day of discharge  I had direct contact with the patient on the day of discharge  Additional documentation is required if more than 30 minutes were spent on discharge       SIGNATURE: Marsha Oviedo PA-C  DATE: September 30, 2021  TIME: 10:42 AM

## 2021-09-30 NOTE — DISCHARGE INSTRUCTIONS
Please test blood sugars three times daily before meals & once daily before bedtime  Record in a log & bring to follow-up appointment with primary care physician  Sternal Precautions   WHAT YOU NEED TO KNOW:   What are sternal precautions? Sternal precautions are used to help protect your sternum (breastbone) after open chest surgery  Wires are placed during surgery to hold the sternum together as it heals  Sternal precautions help prevent the wires from cutting through the sternum  The precautions also help prevent the sternum from coming apart from an injury, and prevent pain and bleeding  You may need to use the precautions for up to 12 weeks after surgery  Your surgeon will give you specific instructions based on the type of surgery you had  It is important to follow the instructions carefully  An injury to the healing sternum can be life-threatening  What are some general sternal precautions? Start slowly and do more as you get stronger  Pain medicine might make it harder for you to know when to slow down or be careful  Stop immediately if you hear a crunch or pop in your sternum  · Protect your sternum  Hug a pillow to your chest or cross your arms over your chest when you laugh, sneeze, or cough  · Be careful when you get into or out of a chair or bed  Hug a pillow or cross your arms when you stand or sit  Do not twist as you move  Use only your legs to sit and stand  You may need to use a raised toilet seat if you have trouble standing up without using your arms  Your healthcare provider may teach you to use your elbow for support as you move from lying to sitting  · Ask when you may take a bath or shower  You may need to use a bath chair if you have trouble getting into or out of the tub  Do not use a grab bar  · Do not lift or carry anything heavier than 10 pounds  For example, a gallon of milk weighs 8 pounds  · Keep your arms down as much as possible    Do not put your arms out to the side, behind you, or over your head  Do not let anyone pull your arms to help you move or dress  Do not reach for items  · Do not push or pull anything  Examples include a car door or a vacuum   · Do not drive while you are healing  Your surgeon will tell you when it is safe for you to start driving again  How do I care for my surgery wound? Always wash your hands before you care for your wound  Wash your wound as directed  Do not rub the wound as you wash or dry the area  Pat the area dry with a clean towel  Change the bandages as directed and when they get wet or dirty  Do not smoke:  Nicotine can damage blood vessels and make it more difficult to heal  Do not use e-cigarettes or smokeless tobacco in place of cigarettes or to help you quit  They still contain nicotine  Ask your healthcare provider for information if you currently smoke and need help quitting  Call 911 for any of the following:   · You have sudden pain in your sternum and hear a crunch or pop  · You have bleeding that does not stop even after you apply pressure for 5 minutes  When should I seek immediate care? · You hear crunching or grinding in your sternum  · You have signs of an infection, such as a fever, red or warm skin, or pus in the surgery wound  When should I contact my healthcare provider? · You continue to feel pain, even after you take your pain medicine  · You have new or worsening pain, or any pain with movement  · You have questions or concerns about your condition or care  CARE AGREEMENT:   You have the right to help plan your care  Learn about your health condition and how it may be treated  Discuss treatment options with your healthcare providers to decide what care you want to receive  You always have the right to refuse treatment  The above information is an  only  It is not intended as medical advice for individual conditions or treatments   Talk to your doctor, nurse or pharmacist before following any medical regimen to see if it is safe and effective for you  © Copyright [employer] 2021 Information is for End User's use only and may not be sold, redistributed or otherwise used for commercial purposes  All illustrations and images included in CareNotes® are the copyrighted property of [employer]  or Radha Ambrosio       CABG (Coronary Artery Bypass Graft)   WHAT YOU NEED TO KNOW:   A CABG is open heart surgery to clear blocked arteries in your heart  CABG surgery improves blood flow to your heart by bypassing (sending blood around) the blocked part of an artery  This restores blood flow to your heart and helps prevent a heart attack  DISCHARGE INSTRUCTIONS:   Call your local emergency number (911 in the [address] [facility]) if:   · You feel lightheaded, short of breath, and have chest pain  · You cough up blood  · You have a fast heartbeat that flutters  · You feel like you are going to faint  Call your doctor if:   · Your arm or leg feels warm, tender, and painful  It may look swollen and red  · You have numbness or tingling in your arms or legs  · You have a severe headache  · You have a fever higher than 101°F (38 4°C)  · You have gained 2 pounds in 24 hours  · Your wound is red, swollen, or draining pus  · Your signs and symptoms return  · You feel depressed  · You have questions or concerns about your condition or care  Medicines: You may need any of the following:  · Prescription pain medicine  may be given  Ask how to take this medicine safely  · Antiplatelets , such as aspirin, help prevent blood clots  Take your antiplatelet medicine exactly as directed  These medicines make it more likely for you to bleed or bruise  If you are told to take aspirin, do not take acetaminophen or ibuprofen instead  · Cholesterol medicine  helps lower cholesterol and lipid levels in your blood      · Antibiotics  help prevent a bacterial infection  · Heart medicine  helps strengthen and regulate your heartbeat  · Blood pressure medicine  helps lower or control your blood pressure  · Take your medicine as directed  Contact your healthcare provider if you think your medicine is not helping or if you have side effects  Tell him or her if you are allergic to any medicine  Keep a list of the medicines, vitamins, and herbs you take  Include the amounts, and when and why you take them  Bring the list or the pill bottles to follow-up visits  Carry your medicine list with you in case of an emergency  Go to cardiac rehabilitation:  Cardiac rehabilitation (rehab) is a program run by specialists who will help you safely strengthen your heart and prevent more heart disease  This plan includes exercise, relaxation, stress management, and heart-healthy nutrition  Healthcare providers will also check to make sure any medicines you take are working  The plan may also include instructions for when you can drive, return to work, and do other normal daily activities  Care for your wound as directed:  Carefully wash the wound with soap and water  If you do not have a bandage, gently pat the incision dry with a clean towel  If you have a bandage, dry the area and put on a new, clean bandage  Change your bandage if it gets wet or dirty  Prevent another blocked artery:   · Manage other health conditions  Diabetes and high cholesterol puts you at risk for a heart attack and stroke  Talk to your healthcare provider about your management plan  He or she will make a plan that helps you manage your conditions  · Eat heart healthy foods  You may need to eat foods that are low in salt, fat, or cholesterol  Healthy foods include fruits, vegetables, whole-grain breads, low-fat dairy products, beans, lean meats, and fish  Ask your healthcare provider for more information about a heart healthy diet  · Do not smoke    Nicotine and other chemicals in cigarettes and cigars can cause heart and lung damage  Ask your healthcare provider for information if you currently smoke and need help to quit  E-cigarettes or smokeless tobacco still contain nicotine  Talk to your healthcare provider before you use these products  · Maintain a healthy weight  Ask your healthcare provider how much you should weigh  Extra weight can increase the stress on your heart  Ask him or her to help you create a weight loss plan if you are overweight  Get a flu shot: The flu can be dangerous for a person who has heart disease  All adults should get the flu vaccine every year as soon as it becomes available  Follow up with your healthcare provider as directed:  Write down your questions so you remember to ask them during your visits  © Copyright "[employer] 2021 Information is for End User's use only and may not be sold, redistributed or otherwise used for commercial purposes  All illustrations and images included in CareNotes® are the copyrighted property of [employer]  or [employer]   The above information is an  only  It is not intended as medical advice for individual conditions or treatments  Talk to your doctor, nurse or pharmacist before following any medical regimen to see if it is safe and effective for you  Meal Planning with Diabetes Exchanges   WHAT YOU NEED TO KNOW:   What do I need to know about diabetes exchanges? Exchanges are servings of food that contain similar amounts of carbohydrate, fat, protein, and calories within a food group  The exchanges can be used to develop a healthy meal plan that helps to keep your blood sugar within the recommended levels  A meal plan with the right amount of carbohydrates is especially important  Your blood sugar naturally rises after you eat carbohydrates  Too many carbohydrates in 1 meal or snack can raise your blood sugar level   Carbohydrates are found in starches, fruit, milk, yogurt, and sweets  How do I create a meal plan with exchanges? A dietitian will work with you to develop a healthy meal plan that is right for you  This meal plan will include the amount of exchanges you can have from each food group throughout the day  Follow your meal plan by keeping track of the amount of exchanges you eat for each meal and snack  Your meal plan will be based on your age, weight, blood sugar levels, medicine, and activity level  What are the starch food group exchanges? Each exchange below contains about 15 grams of carbohydrate , 3 grams of protein, 1 gram of fat, and 80 calories  · 1 ounce of white, whole wheat or rye bread (1 slice)    · 1 ounce of bagel (about ¼ of a bagel)    · 1 6-inch flour or corn tortilla or 1 4-inch pancake (about ¼ inch thick)    · ? cup of cooked pasta or rice    · ¾ cup of dry, ready-to-eat cereal with no sugar added     · ½ cup of cooked cereal, such as oatmeal    · 3 alondra cracker squares or 8 animal crackers    · 6 saltine-type crackers or     · 3 cups of popcorn or ¾ ounce of pretzels     · Starchy vegetables and cooked legumes:      ? ½ cup of corn, green peas, sweet potatoes, or mashed potatoes     ? ¼ of a large baked potato     ? 1 cup of acorn, butternut squash, or pumpkin     ? ½ cup of beans, lentils, or peas (such as finch, kidney, or black-eyed)    ? ? cup of lima beans    What are the fruit group exchanges? Each exchange contains about 15 grams of carbohydrate  and 60 calories  · 1 small (4 ounce) apple, banana orange, or nectarine    · ½ cup of canned or fresh fruit    · ½ cup (4 ounces) of unsweetened fruit juice    · 2 tablespoons of dried fruit    What are the milk group exchanges? Each exchange contains about 12 grams of carbohydrate  and 8 grams of protein  The amount of fat and calories in each serving depends on the type of milk (such as whole, low-fat, or fat-free)    · 1 cup fat-free or low-fat milk    · ¾ cup of plain, nonfat yogurt    · 1 cup fat-free, flavored yogurt with artificial (no calorie) sweetener    What are the non-starchy vegetable group exchanges? Each exchange contains about 5 grams of carbohydrate , 2 grams of protein, and 25 calories  Examples include beets, broccoli, cabbage, carrots, cauliflower, cucumber, mushrooms, tomatoes, and zucchini  · ½ cup of cooked vegetables or 1 cup of raw vegetables     · ½ cup of vegetable juice    What are the meat and meat substitute group exchanges? Each exchange of a lean meat  listed below contains about 7 grams of protein, 0 to 3 grams of fat, and 45 calories  The meat and meat substitutes food group does not contain any carbohydrates  Medium and high-fat meats have more calories  · 1 ounce of chicken or turkey without skin, or 1 ounce of fish (not breaded or fried)     · 1 ounce of lean beef, pork, or lamb     · 1-inch cube or 1 ounce of low-fat cheese     · 2 egg whites or ¼ cup of egg substitute     · ½ cup of tofu    What are the sweets, desserts, and other carbohydrate group exchanges? · Sweets and other desserts:  Each exchange has about 15 grams of carbohydrate   ? 1 ounce of kasandra food cake or 2-inch square cake (unfrosted)    ? 2 small cookies     ? ½ cup of sugar-free, fat-free ice cream    ? 1 tablespoon of syrup, jam, jelly, table sugar, or honey    · Combination foods:     ? 1 cup of an entrée, such as lasagna, spaghetti with meatballs, macaroni and cheese, and chili with beans (each serving counts as 2 carbohydrate exchanges )     ? 1 cup of tomato or vegetable beef soup (each serving counts as 1 carbohydrate exchange )    What are the fat group exchanges? Each exchange contains 5 grams of fat and 45 calories    · 1 teaspoon of oil (such as canola, olive, or corn oil)     · 6 almonds or cashews, 10 peanuts, or 4 pecan halves     · 2 tablespoons of avocado     · ½ tablespoon of peanut butter     · 1 teaspoon of regular margarine or 2 teaspoons of low-fat margarine     · 1 teaspoon of regular butter or 1 tablespoon of low-fat butter     · 1 teaspoon of regular mayonnaise or 1 tablespoon of low-fat mayonnaise     · 1 tablespoon of regular salad dressing or 2 tablespoons of low-fat salad dressing    What are free foods? The foods on this list are called free foods because they have very few calories  Free foods usually do not increase your blood sugar if you limit them  · 1 tablespoon of catsup or taco sauce     · ¼ cup of salsa     · 2 tablespoons of sugar-free syrup or 2 teaspoons of light jam or jelly     · 1 tablespoon of fat-free salad dressing     · 4 tablespoons of fat-free margarine or fat-free mayonnaise     · Sugar-free drinks: diet soda, sugar-free drink mixes, or mineral water     · Low-sodium bouillon or fat-free broth     · Mustard     · Seasonings such as spices, herbs, and garlic     · Sugar-free gelatin without added fruit    What other healthy nutrition guidelines should I follow? · Limit drinks with sugar substitutes  Your dietitian or healthcare provider will encourage you to drink water  Water helps your kidneys to function properly  Ask how much water you should drink every day  · Eat more fiber  Choose foods that are good sources of fiber, such as fruits, vegetables, and whole grains  Cereals that contain 5 or more grams of fiber per serving are good sources of fiber  Legumes such as garbanzo, finch beans, kidney beans, and lentils are also good sources  · Limit fat  Ask your dietitian or healthcare provider how much fat you should eat each day  Choose foods low in fat, saturated fat, trans fat, and cholesterol  Examples include turkey or chicken without the skin, fish, lean cuts of meat, and beans  Low-fat dairy foods, such as low-fat or fat-free milk and low-fat yogurt are also good choices  Omega-3 fatty acids are healthy fats that are found in canola oil, soybean oil and fatty fish   [place], albacore tuna, and sardines are good sources of omega 3 fatty acids  Eat 2 servings of these types of fish each week  Do not eat fried fish  · Limit sugar  Sugar and sweets must be counted toward the carbohydrate exchanges that you can have within your meal plan  Limit sugar and sweets because they are usually also high in calories and fat  Eat smaller portions of sweets by sharing a dessert or asking for a child-size portion at a restaurant  · Limit sodium  (salt) to about 2,300 mg per day  You may need to eat even less sodium if you have certain medical conditions  Foods high in sodium include soy sauce, potato chips, and soup  · Limit alcohol  Ask your healthcare provider if it is safe for you to drink alcohol  If alcohol is safe for you to have, eat a meal when you drink alcohol  If you drink alcohol on an empty stomach, your blood sugar may drop to a low level  Women 21 years or older and men 72 years or older should limit alcohol to 1 drink a day  Men aged 24 to 59 years should limit alcohol to 2 drinks a day  A drink of alcohol is 5 ounces of wine, 12 ounces of beer, or 1½ ounces of liquor  What else can I do to manage my diabetes? · Control your blood sugar level  Test your blood sugar level regularly and keep a record of the results  Ask your healthcare provider when and how often to test your blood sugar  You may need to check your blood sugar level at least 3 times each day  · Talk to your healthcare provider about your weight  Ask if you need to lose weight, and how much you need to lose  If you are overweight, you may need to make other changes to lose weight  Ask your healthcare provider to help you create a weight loss program      · Get regular physical activity  Physical activity can help decrease your blood sugar level  It can also help to decrease your risk for heart disease and help you lose weight  Adults should have moderate intensity physical activity for at least 150 minutes every week   Spread the amount of activity over at least 3 days a week  Do not skip more than 2 days in a row  Children should get at least 60 minutes of moderate physical activity on most days of the week  Examples of moderate physical activity include brisk walking, running, and swimming  Do not sit for longer than 30 minutes  Work with your healthcare provider to create a plan for physical activity  When should I call my doctor? · You have high blood sugar levels during a certain time of day, or almost all of the time  · You often have low blood sugar levels  · You have questions or concerns about your condition or care  CARE AGREEMENT:   You have the right to help plan your care  Discuss treatment options with your healthcare provider to decide what care you want to receive  You always have the right to refuse treatment  The above information is an  only  It is not intended as medical advice for individual conditions or treatments  Talk to your doctor, nurse or pharmacist before following any medical regimen to see if it is safe and effective for you  © Copyright [employer] 2021 Information is for End User's use only and may not be sold, redistributed or otherwise used for commercial purposes  All illustrations and images included in CareNotes® are the copyrighted property of [employer]  or [address] [place] [address]   WHAT YOU NEED TO KNOW:   What do I need to know about narcotic safety? Safety includes the correct use, storage, and disposal of narcotics  Examples of narcotic pain medicines are oxycodone, morphine, fentanyl, and codeine  How are narcotics given? Narcotics can be given as a pill, patch, or suppository  They can also be given as an injection into a vein, near a nerve, or into a joint  Your prescription may include one or both of the following:  · Short-acting narcotics  work fast and relieve pain for about 3 to 6 hours   They are often used for acute or breakthrough pain     · Long-acting narcotics  usually last at least 8 hours  You can take them less often and they may be used for chronic pain  How do I use narcotics safely? · Take prescribed narcotics exactly as directed  Narcotics come with directions based on the kind and how it is given  Talk to your healthcare provider or a pharmacist if you have any questions  Do not take more than the recommended amount  Too much can cause a life-threatening overdose  Do not continue to take it after your pain stops  You may develop tolerance  This means you keep needing higher doses to get the same effect  You may also develop narcotic use disorder  This means you are not able to control your narcotic use  · Do not give narcotics to others or take narcotics that belong to someone else  The kind or amount one person takes may not be right for another  The person you share them with may also be taking medicines that do not mix with narcotics  He or she may drink alcohol or use other drugs that can cause life-threatening problems when mixed with narcotics  · Do not mix narcotics with other medicines or alcohol  The combination can cause an overdose, or cause you to stop breathing  Alcohol, sleeping pills, and medicines such as antihistamines can make you sleepy  A combination with narcotics can lead to a coma  · Do not drive or operate heavy machinery after you use a narcotic  You may feel drowsy or have trouble concentrating  You can injure yourself or others if you drive or use heavy machinery when you are not alert  Your provider or pharmacist can tell you how long to wait after a dose before you do these activities  · Talk to your healthcare provider if you have any side effects  Side effects include nausea, sleepiness, itching, and trouble thinking clearly  Your provider may need to make changes to the kind or amount of narcotic you are taking   He or she can also help you find ways to prevent or relieve side effects  What can I do to manage constipation? Constipation is the most common side effect of narcotic medicine  Constipation is when you have hard, dry bowel movements, or you go longer than usual between bowel movements  Tell your healthcare provider about all changes in your bowel movements while you are taking narcotics  He or she may recommend laxative medicine to help you have a bowel movement  He or she may also change the kind of narcotic you are taking, or change when you take it  The following are more ways you can prevent or relieve constipation:  · Drink liquids as directed  You may need to drink extra liquids to help soften and move your bowels  Ask how much liquid to drink each day and which liquids are best for you  · Eat high-fiber foods  This may help decrease constipation by adding bulk to your bowel movements  High-fiber foods include fruits, vegetables, whole-grain breads and cereals, and beans  Your healthcare provider or dietitian can help you create a high-fiber meal plan  Your provider may also recommend a fiber supplement if you cannot get enough fiber from food  · Exercise regularly  Regular physical activity can help stimulate your intestines  Walking is a good exercise to prevent or relieve constipation  Ask which exercises are best for you  · Schedule a time each day to have a bowel movement  This may help train your body to have regular bowel movements  Bend forward while you are on the toilet to help move the bowel movement out  Sit on the toilet for at least 10 minutes, even if you do not have a bowel movement  How do I store narcotics safely? · Store narcotics where others cannot easily get them  Keep them in a locked cabinet or secure area  Do not  keep them in a purse or other bag you carry with you  A person may be looking for something else and find the narcotics  · Make sure narcotics are stored out of the reach of children    A child can easily overdose on narcotics  Narcotics may look like candy to a small child  What is the best way to dispose of narcotics? The laws vary by country and area  In the [place], the best way is to return the narcotics through a take-back program  This program is offered by the [employer] [facility])  The following are options for using the program:  · Take the narcotics to a [facility] collection site  The site is often a law enforcement center  Call your local law enforcement center for scheduled take-back days in your area  You will be given information on where to go if the collection site is in a different location  · Take the narcotics to an approved pharmacy or hospital   A pharmacy or hospital may be set up as a collection site  You will need to ask if it is a [facility]IA collection site if you were not directed there  A pharmacy or doctor's office may not be able to take back narcotics unless it is a GENIA site  · Use a mail-back system  This means you are given containers to put the narcotics into  You will then mail them in the containers  · Use a take-back drop box  This is a place to leave the narcotics at any time  People and animals will not be able to get into the box  Your local law enforcement agency can tell you where to find a drop box in your area  What are some other safe ways to dispose of narcotics? The medicine may come with disposal instructions  The instructions may vary depending on the brand of medicine you are using  Instructions may come in a Medication Guide, but not every medicine has one  You may instead get instructions from your pharmacy or doctor  Follow instructions carefully  The following are general guidelines to follow:  · Find out if you can flush the narcotic  Some narcotics can be flushed down the toilet or poured into the sink  You will need to contact authorities in your area to see if this is an option for you   The FDA also offers a list of medicines that are safe to flush down the toilet  You can check the list if you cannot get the information for your local area  · Ask your waste management company about rules for putting narcotics in the trash  The company will be able to give you specific directions  Scratch out personal information on the original medicine label so it cannot be read  Then put it in the trash  Do not label the trash or put any information on it about the narcotics  It should look like regular household trash so no one is tempted to look for the narcotics  Keep the trash out of the reach of children and animals  Always make sure trash is secure  · Talk to officials if you live in a facility  If you live in a nursing home or assisted living center, talk to an official  The person will know the rules for your area  What are some other ways to manage pain? · Ask your healthcare provider about non-narcotic medicines to control pain  Some medicines may even work better than narcotics, depending on the cause of your pain  Nonprescription medicines include NSAIDs (such as ibuprofen) and acetaminophen  Prescription medicines include muscle relaxers, antidepressants, and steroids  · Pain may be managed without any medicines  Some ways to relieve pain include massage, aromatherapy, or meditation  Physical or occupational therapy may also help  Where can I find more information? · Drug Enforcement Administration  [facility] [address]  Phone: 1- [phone] - [phone]  Web Address: [facility]/drug_disposal/    · [address]  and Drug Administration  [facility] , [address] [facility]  Phone: 8- 575 - [phone]  Web Address: http://[employer]/  Call your local emergency number (911 in the [address] [facility]), or have someone else call if:   · You have a seizure  · You cannot be woken  · You have trouble staying awake and your breathing is slow or shallow       · Your speech is slurred, or you are confused  · You are dizzy or stumble when you walk  When should I or someone close to me call my doctor? · You are extremely drowsy, or you have trouble staying awake or speaking  · You have pale or clammy skin  · You have blue fingernails or lips  · Your heartbeat is slower than normal     · You cannot stop vomiting  · You have questions or concerns about your condition or care  CARE AGREEMENT:   You have the right to help plan your care  Learn about your health condition and how it may be treated  Discuss treatment options with your healthcare providers to decide what care you want to receive  You always have the right to refuse treatment  The above information is an  only  It is not intended as medical advice for individual conditions or treatments  Talk to your doctor, nurse or pharmacist before following any medical regimen to see if it is safe and effective for you  © Copyright [employer] 2021 Information is for End User's use only and may not be sold, redistributed or otherwise used for commercial purposes   All illustrations and images included in CareNotes® are the copyrighted property of A D A M , Inc  or [address] [place] [address]

## 2021-09-30 NOTE — RESTORATIVE TECHNICIAN NOTE
Restorative Technician Note      Patient Name: Quirino Baca     [employer] Visit Date: 09/30/21  Note Type: Mobility  Patient Position Upon Consult: Bedside chair  Activity Performed: Ambulated  Assistive Device: Other (Comment) (none)  Patient Position at End of Consult: All needs within reach; Bedside chair

## 2021-09-30 NOTE — PLAN OF CARE
Problem: OCCUPATIONAL THERAPY ADULT  Goal: Performs self-care activities at highest level of function for planned discharge setting  See evaluation for individualized goals  Description: Treatment Interventions: ADL retraining, Endurance training, Functional transfer training, Cardiac education          See flowsheet documentation for full assessment, interventions and recommendations  9/30/2021 1129 by Rhiannon Marcano  Outcome: Adequate for Discharge  Note: Limitation: Decreased ADL status, Decreased endurance, Decreased UE ROM, Decreased UE strength  Prognosis: Good  Assessment: Patient participated in Skilled OT session this date with interventions consisting of ADL re training with the use of correct body mechanics, maintaining sternal precautions and  therapeutic activities to: increase activity tolerance   Patient agreeable to OT treatment session, upon arrival patient was found seated OOB to Chair  In comparison to previous session, patient with improvements in activity tolerance  Pt demonstrates good carry over of cardiac/ sternal precautions throughout session  From OT standpoint, recommendation at time of d/c would be Home with family support  Pt reports that he has no concerns about returning home, and that his wife will be home to assist as needed upon d/c  Recommend continued participation in [facility] and functional mobility with staff  No further acute OT needs, d/c OT        OT Discharge Recommendation: No rehabilitation needs (home with increased social support)  OT - OK to Discharge: Yes (When medically appropriate)

## 2021-09-30 NOTE — PROGRESS NOTES
Progress Note - Cardiothoracic Surgery   Shahram Kitchen 62 y o  male MRN: [id number]  Unit/Bed#: [facility] 409-01 Encounter: [id number]  Coronary artery disease  S/P coronary artery bypass grafting; POD # 3    24 Hour Events: Transitioned to SQ insulin by endo  No other events  No complaints  Tolerating diet  (+) flatus, (-) BM, last BM morning of sx  Ambulating well, home at discharge  Pain controlled      Medications:   Scheduled Meds:  Current Facility-Administered Medications   Medication Dose Route Frequency Provider Last Rate   • acetaminophen  975 mg Oral Q8H Stephanie Mojica PA-C     • amiodarone  200 mg Oral [facility] Stephanie Mojica PA-C     • aspirin  325 mg Oral Daily Stephanie Mojica PA-C     • atorvastatin  80 mg Oral Daily With Dinner Stephanie Mojica PA-C     • bisacodyl  10 mg Rectal Daily PRN Stephanie Mojica PA-C     • chlorhexidine  15 mL Mouth/Throat BID Stephanie Mojica PA-C     • cholecalciferol  1,000 Units Oral Daily Stephanie Mojica PA-C     • docusate sodium  100 mg Oral BID Stephanie Mojica PA-C     • fondaparinux  2 5 mg Subcutaneous Daily Stephanie Mojica PA-C     • furosemide  40 mg Intravenous BID (diuretic) Janina Shaikh PA-C     • insulin glargine  30 Units Subcutaneous HS Lea Lesches, MD     • insulin lispro  1-5 Units Subcutaneous TID AC Lea Lesches, MD     • insulin lispro  5 Units Subcutaneous TID With Meals Lea Lesches, MD     • lidocaine (cardiac)  100 mg Intravenous Q30 Min PRN Stephanie Mojica PA-C     • metoprolol tartrate  25 mg Oral Q12H [address] Janina Shaikh PA-C     • mupirocin  1 application Nasal F48A [address] Stephanie Mojica PA-C     • ondansetron  4 mg Intravenous Q6H PRN Stephanie Mojica PA-C     • oxyCODONE  2 5 mg Oral Q4H PRN Stephanie Mojica PA-C     • oxyCODONE  5 mg Oral Q4H PRN Stephanie Mojica PA-C     • pantoprazole  40 mg Oral Daily Stephanie Mojica PA-C     • polyethylene glycol  17 g Oral Daily Stephanie Mojica PA-C     • potassium chloride  20 mEq Oral BID Mando Page PA-C     • sodium chloride  20 mL/hr Intravenous Continuous Luisa Faria PA-C Stopped (09/28/21 0430)   • temazepam  15 mg Oral HS PRN Luisa Faria PA-C       Continuous Infusions:sodium chloride, 20 mL/hr, Last Rate: Stopped (09/28/21 0430)      PRN Meds: bisacodyl  •  lidocaine (cardiac)  •  ondansetron  •  oxyCODONE  •  oxyCODONE  •  temazepam    Vitals:   Vitals:    09/30/21 0237 09/30/21 0557 09/30/21 0754 09/30/21 0828   BP: 106/74  107/64 119/71   BP Location: Left arm  Right arm    Pulse: 82  82    Resp: 16  17    Temp: 98 4 °F (36 9 °C)  97 9 °F (36 6 °C)    TempSrc: Oral  Oral    SpO2: 93%  95%    Weight:  93 9 kg (207 lb)     Height:         Bp x24rsh: /60-70    Telemetry: NSR; Heart Rate: 81; no events/24hrs    Respiratory:   SpO2: SpO2: 95 %, SpO2 Activity: SpO2 Activity: At Rest, SpO2 Device: O2 Device: None (Room air);  Room Air    Intake/Output:     Intake/Output Summary (Last 24 hours) at 9/30/2021 0859  Last data filed at 9/30/2021 0757  Gross per 24 hour   Intake 900 ml   Output 2450 ml   Net -1550 ml      UOP - 900cc/8hrs; 2450cc/24hrs    Chest tube Output:  CTs & EPWs have been discontinued    Weights:   Weight (last 2 days)     Date/Time   Weight    09/30/21 0557   93 9 (207)    09/29/21 0600   95 7 (211)    09/28/21 0551   95 2 (209 8)            Admission weight: 93 3kg - up 0 5kg from admission weight    Results:   Results from last 7 days   Lab Units 09/29/21  0547 09/28/21  0334 09/27/21  1955 09/27/21  1240 09/24/21  0303   WBC Thousand/uL 13 39* 20 26*  --   --  7 41   HEMOGLOBIN g/dL 10 9* 12 6 13 1 13 0 11 9*   HEMATOCRIT % 33 9* 39 1 39 3 39 9 36 5   PLATELETS Thousands/uL 189 258  --  198 262     Results from last 7 days   Lab Units 09/29/21  0547 09/28/21  0334 09/27/21 1955 09/27/21  1244 09/27/21  1240 09/27/21  1145   SODIUM mmol/L 136 138  --   --  140  --    POTASSIUM mmol/L 4 4 4 0 4 2  --  4 0   < >   CHLORIDE mmol/L 108 110*  --   --  113*  --    CO2 mmol/L 24 21  --   --  23  --    CO2, I-STAT mmol/L  --   --   --  23  --   --    BUN mg/dL 36* 23  --   --  18  --    CREATININE mg/dL 1 15 0 98  --   --  0 82  --    GLUCOSE, ISTAT mg/dl  --   --   --  139  --   --    CALCIUM mg/dL 7 6* 7 8*  --   --  8 0*  --     < > = values in this interval not displayed  Results from last 7 days   Lab Units 09/27/21  1240 09/27/21  0424 09/26/21  1944 09/25/21  0103   INR  1 26*  --   --  1 11   PTT seconds 32 >210* 63*  --      Point of care glucose: 131 - 225 - 249 - 242    Studies:  No new studies    I have personally reviewed pertinent reports  and I have personally reviewed pertinent films in PACS    Invasive Lines/Tubes:  Invasive Devices     Central Venous Catheter Line            CVC Central Lines 09/27/21 Triple 3 days          Line            Pacer Wires 2 days    Pacer Wires 2 days                Physical Exam:    HEENT/NECK:  Normocephalic  Atraumatic  No jugular venous distention  Cardiac: Regular rate and rhythm and No murmurs/rubs/gallops  Pulmonary:  Crackles bilaterally  Abdomen:  Non-tender, Non-distended and Normal bowel sounds  Incisions: Sternum is stable  Incision dressed with Acticoat  No erythema or drainage and Saphenectomy incision dressed with Acticoat  No erythema or drainage  Extremities: Extremities warm/dry and Trace edema B/L  Neuro: Alert and oriented X 3  Skin: Warm/Dry, without rashes or lesions  Assessment:  Principal Problem:    ACS (acute coronary syndrome) (HCC)  Active Problems:    Type 2 diabetes mellitus with diabetic polyneuropathy, without long-term current use of insulin (HCC)    Essential hypertension    Anemia    Hyperlipidemia    CAD (coronary artery disease)    S/P CABG x 2       Coronary artery disease  S/P coronary artery bypass grafting; POD # 3    Plan:    1   Cardiac:   NSR; HR/BP well-controlled  Continue Lopressor, 25mg PO BID  Continue ASA and Statin therapy  Epicardial pacing wires out  Central IV access no longer required; Remove central venous catheter today  Continue DVT prophylaxis    2  Pulmonary:   Good Room air oxygen saturation; Continue incentive spirometry/Coughing/Deep breathing exercises  Chest tubes have been discontinued    3  Renal:   Intake/Output net: (-)1508 mL/24 hours  Diuretic Regimen:  Continue Lasix 40 mg IV BID  Continue Potassium Chloride 20 mEq PO BID  Post op Creatinine stable; Follow up labs prn    4  Neuro:  Neurologically intact; No active issues  Incisional pain well-controlled  Continue Tylenol, 975 mg PO q 8, standing dose  Continue Oxycodone, 2 5 to 5 mg PO q 4 hours prn pain    5  GI:  Tolerating TLC 2 3 gm sodium diet  Maintain 1800 mL daily fluid restriction   Continue stool softeners and prn suppository  Continue GI prophylaxis    6  Endo:   History of diabetes; Continue SQ insulin therapy as directed by endocrinology physician  Insulin administration teaching for home therapy ordered   Continue Vitamin D supplementation    7    Hematology:    Post-operative acute blood loss anemia; Hemoglobin 10 9; trend prn    Likely dilutional, recheck at 1200    8     Disposition:      Ambulating independently, Anticipate discharge to home today vs tomorrow pending surgeon input     VTE Pharmacologic Prophylaxis: Fondaparinux (Arixtra)  VTE Mechanical Prophylaxis: sequential compression device    Collaborative rounds completed with supervising physician  Plan of care discussed with bedside nurse    SIGNATURE: Elder Carbajal PA-C  DATE: September 30, 2021  TIME: 8:59 AM

## 2021-10-01 NOTE — UTILIZATION REVIEW
Notification of Discharge   This is a Notification of Discharge from our facility [address]  Please be advised that this patient has been discharge from our facility  Below you will find the admission and discharge date and time including the patient’s disposition  UTILIZATION REVIEW CONTACT:  Annia Murillo  Utilization   Network Utilization Review Department  Phone: [phone] x carefully listen to the prompts  All voicemails are confidential   Email: [email or web address]     PHYSICIAN ADVISORY SERVICES:  FOR QGVE-XL-EXCO REVIEW - MEDICAL NECESSITY DENIAL  Phone: [phone]  Fax: [phone]  Email: [email or web address]     PRESENTATION DATE: 9/23/2021  5:17 PM  OBERVATION ADMISSION DATE:   INPATIENT ADMISSION DATE: 9/23/21  5:17 PM   DISCHARGE DATE: 9/30/2021  1:00 PM  DISPOSITION: Home with [place] with [address] INFORMATION:  Send all requests for admission clinical reviews, approved or denied determinations and any other requests to dedicated fax number below belonging to the campus where the patient is receiving treatment   List of dedicated fax numbers:  [address] [place] [address] DENIALS (Administrative/Medical Necessity) [phone]   [address] [place] [address] (Maternity/NICU/Pediatrics) [phone]   [facility] [phone]   [facility] [phone]   [address] [phone]   [facility] [phone]   [facility] [place] [address] [phone]   [facility]  [phone]   [address] [facility] [phone]   [facility] [address] [facility] [phone]

## 2021-10-06 ENCOUNTER — TELEPHONE (OUTPATIENT)
Dept: CARDIAC SURGERY | Facility: CLINIC | Age: 61
End: 2021-10-06

## 2021-10-07 ENCOUNTER — TELEPHONE (OUTPATIENT)
Dept: CARDIAC SURGERY | Facility: CLINIC | Age: 61
End: 2021-10-07

## 2021-10-11 ENCOUNTER — OFFICE VISIT (OUTPATIENT)
Dept: CARDIOLOGY CLINIC | Facility: CLINIC | Age: 61
End: 2021-10-11
Payer: COMMERCIAL

## 2021-10-11 VITALS
BODY MASS INDEX: 31.14 KG/M2 | OXYGEN SATURATION: 98 % | DIASTOLIC BLOOD PRESSURE: 76 MMHG | SYSTOLIC BLOOD PRESSURE: 130 MMHG | HEART RATE: 69 BPM | HEIGHT: 67 IN | WEIGHT: 198.4 LBS

## 2021-10-11 DIAGNOSIS — E78.5 HYPERLIPIDEMIA, UNSPECIFIED HYPERLIPIDEMIA TYPE: ICD-10-CM

## 2021-10-11 DIAGNOSIS — E11.42 TYPE 2 DIABETES MELLITUS WITH DIABETIC POLYNEUROPATHY, WITHOUT LONG-TERM CURRENT USE OF INSULIN (HCC): ICD-10-CM

## 2021-10-11 DIAGNOSIS — Z95.1 S/P CABG X 2: Primary | ICD-10-CM

## 2021-10-11 DIAGNOSIS — E66.9 CLASS 1 OBESITY WITH SERIOUS COMORBIDITY AND BODY MASS INDEX (BMI) OF 30.0 TO 30.9 IN ADULT, UNSPECIFIED OBESITY TYPE: ICD-10-CM

## 2021-10-11 DIAGNOSIS — I25.5 ISCHEMIC CARDIOMYOPATHY: ICD-10-CM

## 2021-10-11 DIAGNOSIS — I10 ESSENTIAL HYPERTENSION: ICD-10-CM

## 2021-10-11 PROCEDURE — 99215 OFFICE O/P EST HI 40 MIN: CPT | Performed by: NURSE PRACTITIONER

## 2021-10-11 RX ORDER — LISINOPRIL 5 MG/1
5 TABLET ORAL DAILY
Qty: 30 TABLET | Refills: 3 | Status: SHIPPED | OUTPATIENT
Start: 2021-10-11 | End: 2021-10-26 | Stop reason: SINTOL

## 2021-10-22 ENCOUNTER — TELEPHONE (OUTPATIENT)
Dept: CARDIOLOGY CLINIC | Facility: CLINIC | Age: 61
End: 2021-10-22

## 2021-10-29 ENCOUNTER — OFFICE VISIT (OUTPATIENT)
Dept: CARDIAC SURGERY | Facility: CLINIC | Age: 61
End: 2021-10-29

## 2021-10-29 ENCOUNTER — TELEPHONE (OUTPATIENT)
Dept: CARDIOLOGY CLINIC | Facility: CLINIC | Age: 61
End: 2021-10-29

## 2021-10-29 VITALS
HEART RATE: 72 BPM | BODY MASS INDEX: 31.86 KG/M2 | HEIGHT: 67 IN | DIASTOLIC BLOOD PRESSURE: 87 MMHG | TEMPERATURE: 98.1 F | RESPIRATION RATE: 18 BRPM | WEIGHT: 203 LBS | SYSTOLIC BLOOD PRESSURE: 182 MMHG

## 2021-10-29 DIAGNOSIS — Z95.1 S/P CABG X 2: Primary | ICD-10-CM

## 2021-10-29 PROCEDURE — 99024 POSTOP FOLLOW-UP VISIT: CPT | Performed by: PHYSICIAN ASSISTANT

## 2021-11-24 DIAGNOSIS — I42.8 OTHER CARDIOMYOPATHY (HCC): ICD-10-CM

## 2021-11-24 RX ORDER — LOSARTAN POTASSIUM 25 MG/1
25 TABLET ORAL
Qty: 30 TABLET | Refills: 3 | Status: SHIPPED | OUTPATIENT
Start: 2021-11-24 | End: 2021-11-29 | Stop reason: CLARIF

## 2021-11-26 ENCOUNTER — TELEPHONE (OUTPATIENT)
Dept: CARDIOLOGY CLINIC | Facility: CLINIC | Age: 61
End: 2021-11-26

## 2021-11-29 DIAGNOSIS — I10 HYPERTENSION, UNSPECIFIED TYPE: Primary | ICD-10-CM

## 2021-11-29 RX ORDER — LOSARTAN POTASSIUM AND HYDROCHLOROTHIAZIDE 25; 100 MG/1; MG/1
1 TABLET ORAL DAILY
Qty: 30 TABLET | Refills: 4 | Status: SHIPPED | OUTPATIENT
Start: 2021-11-29 | End: 2021-12-07 | Stop reason: HOSPADM

## 2021-12-07 ENCOUNTER — OFFICE VISIT (OUTPATIENT)
Dept: CARDIOLOGY CLINIC | Facility: CLINIC | Age: 61
End: 2021-12-07
Payer: COMMERCIAL

## 2021-12-07 VITALS
DIASTOLIC BLOOD PRESSURE: 98 MMHG | HEART RATE: 62 BPM | SYSTOLIC BLOOD PRESSURE: 156 MMHG | BODY MASS INDEX: 32.8 KG/M2 | HEIGHT: 67 IN | OXYGEN SATURATION: 99 % | WEIGHT: 209 LBS

## 2021-12-07 DIAGNOSIS — I25.5 ISCHEMIC CARDIOMYOPATHY: ICD-10-CM

## 2021-12-07 DIAGNOSIS — E78.2 MIXED HYPERLIPIDEMIA: ICD-10-CM

## 2021-12-07 DIAGNOSIS — Z95.1 S/P CABG X 2: ICD-10-CM

## 2021-12-07 DIAGNOSIS — I10 ESSENTIAL HYPERTENSION: ICD-10-CM

## 2021-12-07 DIAGNOSIS — E11.42 TYPE 2 DIABETES MELLITUS WITH DIABETIC POLYNEUROPATHY, WITHOUT LONG-TERM CURRENT USE OF INSULIN (HCC): ICD-10-CM

## 2021-12-07 DIAGNOSIS — I25.10 CORONARY ARTERY DISEASE INVOLVING NATIVE CORONARY ARTERY OF NATIVE HEART WITHOUT ANGINA PECTORIS: Primary | ICD-10-CM

## 2021-12-07 DIAGNOSIS — I24.9 ACS (ACUTE CORONARY SYNDROME) (HCC): ICD-10-CM

## 2021-12-07 PROCEDURE — 99214 OFFICE O/P EST MOD 30 MIN: CPT | Performed by: INTERNAL MEDICINE

## 2021-12-07 RX ORDER — LOSARTAN POTASSIUM 25 MG/1
25 TABLET ORAL DAILY
Qty: 90 TABLET | Refills: 3 | Status: SHIPPED | OUTPATIENT
Start: 2021-12-07 | End: 2021-12-15 | Stop reason: SDUPTHER

## 2021-12-07 RX ORDER — LOSARTAN POTASSIUM 25 MG/1
25 TABLET ORAL DAILY
COMMUNITY
End: 2021-12-07 | Stop reason: SDUPTHER

## 2021-12-09 RX ORDER — ROSUVASTATIN CALCIUM 40 MG/1
40 TABLET, COATED ORAL DAILY
Qty: 90 TABLET | Refills: 3 | Status: SHIPPED | OUTPATIENT
Start: 2021-12-09

## 2021-12-15 ENCOUNTER — TELEPHONE (OUTPATIENT)
Dept: CARDIOLOGY CLINIC | Facility: CLINIC | Age: 61
End: 2021-12-15

## 2021-12-15 DIAGNOSIS — I10 ESSENTIAL HYPERTENSION: ICD-10-CM

## 2021-12-15 DIAGNOSIS — I24.9 ACS (ACUTE CORONARY SYNDROME) (HCC): ICD-10-CM

## 2021-12-15 DIAGNOSIS — E11.42 TYPE 2 DIABETES MELLITUS WITH DIABETIC POLYNEUROPATHY, WITHOUT LONG-TERM CURRENT USE OF INSULIN (HCC): ICD-10-CM

## 2021-12-15 DIAGNOSIS — Z95.1 S/P CABG X 2: ICD-10-CM

## 2021-12-15 RX ORDER — LOSARTAN POTASSIUM 50 MG/1
50 TABLET ORAL DAILY
Qty: 90 TABLET | Refills: 3 | Status: SHIPPED | OUTPATIENT
Start: 2021-12-15

## 2021-12-15 RX ORDER — METOPROLOL TARTRATE 37.5 MG/1
37.5 TABLET, FILM COATED ORAL EVERY 12 HOURS SCHEDULED
Qty: 30 TABLET | Refills: 6 | Status: SHIPPED | OUTPATIENT
Start: 2021-12-15 | End: 2022-01-03 | Stop reason: SDUPTHER

## 2022-01-03 DIAGNOSIS — E11.42 TYPE 2 DIABETES MELLITUS WITH DIABETIC POLYNEUROPATHY, WITHOUT LONG-TERM CURRENT USE OF INSULIN (HCC): ICD-10-CM

## 2022-01-03 DIAGNOSIS — I24.9 ACS (ACUTE CORONARY SYNDROME) (HCC): ICD-10-CM

## 2022-01-03 DIAGNOSIS — Z95.1 S/P CABG X 2: ICD-10-CM

## 2022-01-03 RX ORDER — METOPROLOL TARTRATE 37.5 MG/1
37.5 TABLET, FILM COATED ORAL EVERY 12 HOURS SCHEDULED
Qty: 180 TABLET | Refills: 3 | Status: SHIPPED | OUTPATIENT
Start: 2022-01-03

## 2022-01-18 ENCOUNTER — TELEPHONE (OUTPATIENT)
Dept: CARDIOLOGY CLINIC | Facility: CLINIC | Age: 62
End: 2022-01-18

## 2022-01-18 NOTE — TELEPHONE ENCOUNTER
pts wife called me saying her husbands blood pressure is still running high but she didn't really know his numbers  I called jagjit back and he said his blood pressures are around 170/100 still after you changing his medication around 1 month ago   Please advise

## 2022-02-24 ENCOUNTER — TELEPHONE (OUTPATIENT)
Dept: CARDIOLOGY CLINIC | Facility: CLINIC | Age: 62
End: 2022-02-24

## 2022-02-24 NOTE — TELEPHONE ENCOUNTER
Wife caled for refills of Metoprolol 37 5 mg BID refill   called [facility] and states there is a valid script on file and available  Returned call to wife and told her same

## 2022-03-07 ENCOUNTER — HOSPITAL ENCOUNTER (OUTPATIENT)
Dept: NON INVASIVE DIAGNOSTICS | Facility: HOSPITAL | Age: 62
Discharge: HOME | End: 2022-03-07
Attending: INTERNAL MEDICINE
Payer: COMMERCIAL

## 2022-03-07 VITALS
HEIGHT: 67 IN | WEIGHT: 209 LBS | SYSTOLIC BLOOD PRESSURE: 156 MMHG | HEART RATE: 65 BPM | BODY MASS INDEX: 32.8 KG/M2 | DIASTOLIC BLOOD PRESSURE: 98 MMHG

## 2022-03-07 DIAGNOSIS — I25.10 CORONARY ARTERY DISEASE INVOLVING NATIVE CORONARY ARTERY OF NATIVE HEART WITHOUT ANGINA PECTORIS: ICD-10-CM

## 2022-03-07 DIAGNOSIS — I25.5 ISCHEMIC CARDIOMYOPATHY: ICD-10-CM

## 2022-03-07 DIAGNOSIS — Z95.1 S/P CABG X 2: ICD-10-CM

## 2022-03-07 LAB
AORTIC ROOT: 3.6 CM
AORTIC VALVE MEAN VELOCITY: 11.5 M/S
APICAL FOUR CHAMBER EJECTION FRACTION: 56 %
AV AREA BY CONTINUOUS VTI: 2 CM2
AV AREA PEAK VELOCITY: 1.9 CM2
AV LVOT MEAN GRADIENT: 2 MMHG
AV LVOT PEAK GRADIENT: 4 MMHG
AV MEAN GRADIENT: 6 MMHG
AV PEAK GRADIENT: 10 MMHG
AV VALVE AREA: 1.99 CM2
AV VELOCITY RATIO: 0.62
DOP CALC AO PEAK VEL: 1.6 M/S
DOP CALC AO VTI: 35.5 CM
DOP CALC LVOT AREA: 3.14 CM2
DOP CALC LVOT DIAMETER: 2 CM
DOP CALC LVOT PEAK VEL VTI: 22.51 CM
DOP CALC LVOT PEAK VEL: 0.99 M/S
DOP CALC LVOT STROKE INDEX: 35.9 ML/M2
DOP CALC LVOT STROKE VOLUME: 70.68 CM3
E WAVE DECELERATION TIME: 208 MS
FRACTIONAL SHORTENING: 24 % (ref 28–44)
GLOBAL LONGITUIDAL STRAIN: -12 %
INTERVENTRICULAR SEPTUM IN DIASTOLE (PARASTERNAL SHORT AXIS VIEW): 1.2 CM
INTERVENTRICULAR SEPTUM: 1.2 CM (ref 0.55–1.03)
LEFT ATRIUM AREA SYSTOLE SINGLE PLANE A4C: 21.3 CM2
LEFT ATRIUM SIZE: 4.9 CM
LEFT INTERNAL DIMENSION IN SYSTOLE: 3.7 CM (ref 3.46–5.23)
LEFT VENTRICLE DIASTOLIC VOLUME (MOD BIPLANE): 173 ML (ref 103.17–232.36)
LEFT VENTRICLE SYSTOLIC VOLUME (MOD BIPLANE): 82 ML
LEFT VENTRICULAR INTERNAL DIMENSION IN DIASTOLE: 4.9 CM (ref 5.72–8.52)
LEFT VENTRICULAR POSTERIOR WALL IN END DIASTOLE: 1.4 CM (ref 0.53–1.01)
LEFT VENTRICULAR STROKE VOLUME: 53 ML
LV EF: 52 %
LVSV (TEICH): 53 ML
MV E'TISSUE VEL-SEP: 6 CM/S
MV PEAK A VEL: 0.81 M/S
MV PEAK E VEL: 70 CM/S
MV STENOSIS PRESSURE HALF TIME: 60 MS
MV VALVE AREA P 1/2 METHOD: 3.67 CM2
PA SYSTOLIC PRESSURE: 35 MMHG
RA PRESSURE ESTIMATED: 5 MMHG
RIGHT ATRIUM AREA SYSTOLE A4C: 14.5 CM2
RIGHT VENTRICLE ID DIMENSION: 4.4 CM
RV PSP: 35 MMHG
SL CV LV DIAS VOL ENDO Z SCORE: 0.16
SL CV LV EF: 52
SL CV PED ECHO LEFT VENTRICLE DIASTOLIC VOLUME (MOD BIPLANE) 2D: 112 ML
SL CV PED ECHO LEFT VENTRICLE SYSTOLIC VOLUME (MOD BIPLANE) 2D: 59 ML
TR MAX PG: 30 MMHG
TR PEAK VELOCITY: 2.7 M/S
TRICUSPID VALVE PEAK REGURGITATION VELOCITY: 2.73 M/S
Z-SCORE OF INTERVENTRICULAR SEPTUM IN END DIASTOLE: 3.37
Z-SCORE OF LEFT VENTRICULAR DIMENSION IN END DIASTOLE: -3.54
Z-SCORE OF LEFT VENTRICULAR DIMENSION IN END SYSTOLE: -1.1
Z-SCORE OF LEFT VENTRICULAR POSTERIOR WALL IN END DIASTOLE: 5.12

## 2022-03-07 PROCEDURE — 93356 MYOCRD STRAIN IMG SPCKL TRCK: CPT | Performed by: INTERNAL MEDICINE

## 2022-03-07 PROCEDURE — 93321 DOPPLER ECHO F-UP/LMTD STD: CPT

## 2022-03-07 PROCEDURE — 93308 TTE F-UP OR LMTD: CPT

## 2022-03-07 PROCEDURE — 93325 DOPPLER ECHO COLOR FLOW MAPG: CPT

## 2022-03-07 PROCEDURE — 93308 TTE F-UP OR LMTD: CPT | Performed by: INTERNAL MEDICINE

## 2022-03-07 PROCEDURE — 93325 DOPPLER ECHO COLOR FLOW MAPG: CPT | Performed by: INTERNAL MEDICINE

## 2022-03-07 PROCEDURE — 93321 DOPPLER ECHO F-UP/LMTD STD: CPT | Performed by: INTERNAL MEDICINE

## 2022-03-21 ENCOUNTER — OFFICE VISIT (OUTPATIENT)
Dept: CARDIOLOGY CLINIC | Facility: CLINIC | Age: 62
End: 2022-03-21
Payer: COMMERCIAL

## 2022-03-21 VITALS
BODY MASS INDEX: 32.83 KG/M2 | WEIGHT: 209.6 LBS | SYSTOLIC BLOOD PRESSURE: 140 MMHG | HEART RATE: 57 BPM | DIASTOLIC BLOOD PRESSURE: 88 MMHG

## 2022-03-21 DIAGNOSIS — I10 ESSENTIAL HYPERTENSION: ICD-10-CM

## 2022-03-21 DIAGNOSIS — Z95.1 S/P CABG X 2: Primary | ICD-10-CM

## 2022-03-21 DIAGNOSIS — I25.5 ISCHEMIC CARDIOMYOPATHY: ICD-10-CM

## 2022-03-21 DIAGNOSIS — I25.10 CORONARY ARTERY DISEASE INVOLVING NATIVE CORONARY ARTERY OF NATIVE HEART WITHOUT ANGINA PECTORIS: ICD-10-CM

## 2022-03-21 PROCEDURE — 99214 OFFICE O/P EST MOD 30 MIN: CPT | Performed by: INTERNAL MEDICINE

## 2022-03-21 NOTE — ASSESSMENT & PLAN NOTE
Mr Rolf Greenwood appears to be overall doing well from cardiac perspective with no recent symptoms that suggest angina or heart failure and improved and good exercise tolerance  Though blood pressure today was slightly increased in office he mentions that blood pressures at home and during the his primary physician visit are normal   He was a little anxious to know about the results of his echocardiogram done recently and this may have contributed to increased blood pressure  Today we reviewed his recent echocardiogram which shows significant improvement in left ventricular ejection fraction  His diabetes seems to be reasonably well controlled  On examination there is no evidence of pulmonary vascular congestion or peripheral vascular congestion  There is no significant valvular heart disease  -- I am advising him to continue his current medical regimen  -- given his history of CAD and ischemic cardiomyopathy he may be a reasonable candidate to consider addition of SGLT2 inhibitor therapy for his diabetes  I am advising him to discuss this during his next routine visit with his primary physician  -- regarding the dose of aspirin I am advising him to continue the current dose of 325 mg once daily as there is some data that suggests that it helps with patency of his bypass grafts and is better compared to 81 mg daily especially in the initial few years following the bypass  -- Dietary and medical compliance are reinforced  -- He is advised  to report any concerning symptoms such as chest pain, shortness of breath, decline in exercise tolerance or presyncope/syncope

## 2022-03-21 NOTE — PROGRESS NOTES
[facility]  [address] [facility], [place] [address]  Phone#  [phone]  Fax#  [phone]  *-*-*-*-*-*-*-*-*-*-*-*-*-*-*-*-*-*-*-*-*-*-*-*-*-*-*-*-*-*-*-*-*-*-*-*-*-*-*-*-*-*-*-*-*-*-*-*-*-*-*-*-*-*  ENCOUNTER DATE: 03/21/22 9:38 AM  PATIENT NAME: Vilma Samaniego   1960    [id number]  AGE:61 y o  SEX: male  Nino Arnold MD     PRIMARY CARE PHYSICIAN: Denia Malik DO    DIAGNOSIS:  1  Significant double-vessel coronary artery disease including left main disease, status post acute MI September 23, 2021, now status post lima to LAD and SVG to left posterior lateral branch, with endovascular harvest from left leg (September 27, 2021)  2  Ischemic cardiomyopathy with suspected moderately reduced left ventricular systolic function  3  Diabetes mellitus  4  Essential hypertension  5  Anemia  6  Dyslipidemia  7  History of significant alcohol use  8  Vitamin-D deficiency  9  Obesity    ECHOCARDIOGRAM March 3, 2022: Mild to moderate concentric left ventricular hypertrophy, low-normal left ventricular systolic function, EF 53%, normal diastolic function, decreased global longitudinal strain,-12%, mildly dilated right ventricle with low-normal right ventricular systolic function, mild left atrial enlargement, aortic valve sclerosis and thickening with mildly reduced mobility without aortic valve stenosis  Moderate mitral annular calcification, mild to moderate tricuspid valve regurgitation, estimated RVSP/PASP around 35 mmHg, possible mild pulmonary hypertension, no pericardial effusion  CARDIAC CATHETERIZATION September 23, 2021  -- The coronary circulation is right dominant  -- Distal left main: There was a 90 % stenosis  The lesion was complex and heavily calcified  -- LAD: The vessel was medium sized and heavily calcified  The artery was shown to have competitive flow  -- Ostial LAD: There was a 90 % stenosis   There was EMPERATRIZ grade 2 flow through the vessel (partial perfusion)  -- Distal LAD: The distal vessel was supplied by moderate collaterals from the right posterior descending   -- 1st diagonal: The vessel was medium sized  There was a 90 % stenosis  -- Circumflex: The vessel was large sized  Angiography showed mild atherosclerosis  There were two major obtuse marginals  -- Ostial circumflex: There was a 90 % stenosis  -- RCA: The vessel was medium sized  Angiography showed mild atherosclerosis  ECHOCARDIOGRAM September 23, 2021:  1  Borderline dilated left ventricular cavity size, mildly increased wall thickness, moderately reduced left ventricular systolic function with regional wall motion abnormalities  Ejection fraction is estimated as around 38%  2  Normal right ventricular size and systolic function  3  Mild left atrial cavity enlargement  4  Aortic valve sclerosis with leaflet thickening and some focal calcification  No aortic valve stenosis or regurgitation  5  Mild mitral annular calcification and mitral valve leaflet sclerosis, mild mitral valve regurgitation  6  Trace to mild tricuspid valve regurgitation  7  Possible mild pulmonary hypertension  Estimated RVSP/PASP is 41 mmHg  8  Trace, hemodynamically insignificant pericardial effusion  CURRENT ECG   No results found for this visit on 03/21/22  CARDIOLOGY ASSESSMENT & PLAN     1  S/P CABG x 2     2  Ischemic cardiomyopathy     3  Essential hypertension     4  Coronary artery disease involving native coronary artery of native heart without angina pectoris       CAD (coronary artery disease)  Mr Trever Herrera appears to be overall doing well from cardiac perspective with no recent symptoms that suggest angina or heart failure and improved and good exercise tolerance    Though blood pressure today was slightly increased in office he mentions that blood pressures at home and during the his primary physician visit are normal   He was a little anxious to know about the results of his echocardiogram done recently and this may have contributed to increased blood pressure  Today we reviewed his recent echocardiogram which shows significant improvement in left ventricular ejection fraction  His diabetes seems to be reasonably well controlled  On examination there is no evidence of pulmonary vascular congestion or peripheral vascular congestion  There is no significant valvular heart disease  -- I am advising him to continue his current medical regimen  -- given his history of CAD and ischemic cardiomyopathy he may be a reasonable candidate to consider addition of SGLT2 inhibitor therapy for his diabetes  I am advising him to discuss this during his next routine visit with his primary physician  -- regarding the dose of aspirin I am advising him to continue the current dose of 325 mg once daily as there is some data that suggests that it helps with patency of his bypass grafts and is better compared to 81 mg daily especially in the initial few years following the bypass  -- Dietary and medical compliance are reinforced  -- He is advised  to report any concerning symptoms such as chest pain, shortness of breath, decline in exercise tolerance or presyncope/syncope  INTERVAL HISTORY & HISTORY OF PRESENT ILLNESS     Osvaldo Abdicinthiatramaine is here for follow-up regarding his cardiac comorbidities which include:  Coronary artery disease with history of MI and history of CABG in September 2021, primary hypertension, ischemic cardiomyopathy and other comorbidities  He was last seen by me in December 2021  Today he reports that he has been overall feeling well with no recent significant symptoms except for occasional transient feeling of numbness in the left upper chest region  Symptom occurs spontaneously and does not bother him much  It is not accompanied with chest pain  There have been no recent active symptoms of chest discomfort or exertional angina     There is no dyspnea with usual activities or exertion  No orthopnea, PND or pedal edema  No palpitations, lightheadedness, presyncope, or syncope  Denies any recent hospitalizations or other illnesses  Reports being compliant with medications  Functional capacity status:  Excellent   (Excellent- >10 METs; Good: (7-10 METs); Moderate (4-7 METs); Poor (<= 4 METs)    Any chronic stressors: None   (feeling of poor health, financial problems, and social isolation etc)  Tobacco or alcohol dependence:  He does not smoke  Drinks now only on weekends  Current cardiac medications:  Aspirin 325 mg daily, metoprolol tartrate 37 5 mg twice daily, rosuvastatin 40 mg daily, losartan 50 mg daily  He is on insulin and metformin  Last blood work is from January 2022  His hemoglobin A1c was 6 9  He reports that he monitors his blood pressures at home regularly and they are predominantly in 543S systolic  REVIEW OF SYSTEMS   Positive for:  As noted above in HPI  Negative for: All remaining as reviewed below and in HPI      SYSTEM SYMPTOMS REVIEWED:  General--weight change, fever, night sweats  Respiratory--cough, wheezing, shortness of breath, sputum production  Cardiovascular--chest pain, syncope, dyspnea on exertion, edema, decline in exercise tolerance, claudication   Gastrointestinal--persistent vomiting, diarrhea, abdominal distention, blood in stool   Muscular or skeletal--joint pain or swelling   Neurologic--headaches, syncope, abnormal movement  Hematologic--history of easy bruising and bleeding   Endocrine--thyroid enlargement, heat or cold intolerance, polyuria   Psychiatric--anxiety, depression     *-*-*-*-*-*-*-*-*-*-*-*-*-*-*-*-*-*-*-*-*-*-*-*-*-*-*-*-*-*-*-*-*-*-*-*-*-*-*-*-*-*-*-*-*-*-*-*-*-*-*-*-*-*-  VITAL SIGNS     CURRENT VITAL SIGNS:   Vitals:    03/21/22 0901   BP: 140/88   BP Location: Right arm   Patient Position: Sitting   Cuff Size: Large   Pulse: 57   Weight: 95 1 kg (209 lb 9 6 oz)       BMI: Body mass index is 32 83 kg/m²  WEIGHTS:   Wt Readings from Last 25 Encounters:   03/21/22 95 1 kg (209 lb 9 6 oz)   03/07/22 94 8 kg (209 lb)   12/07/21 94 8 kg (209 lb)   10/29/21 92 1 kg (203 lb)   10/11/21 90 kg (198 lb 6 4 oz)   09/30/21 93 9 kg (207 lb)   09/22/21 92 9 kg (204 lb 12 9 oz)   04/08/20 90 3 kg (199 lb)   09/07/19 90 7 kg (199 lb 15 3 oz)   07/29/19 89 6 kg (197 lb 8 5 oz)        *-*-*-*-*-*-*-*-*-*-*-*-*-*-*-*-*-*-*-*-*-*-*-*-*-*-*-*-*-*-*-*-*-*-*-*-*-*-*-*-*-*-*-*-*-*-*-*-*-*-*-*-*-*-  PHYSICAL EXAM     General Appearance:    Alert, cooperative, no distress, appears stated age   Head, Eyes, ENT:    No obvious abnormality, moist mucous mebranes  Neck:   Supple, no carotid bruit or JVD   Back:     Symmetric, no curvature  Lungs:     Respirations unlabored  Clear to auscultation bilaterally,    Chest wall:    No tenderness or deformity   Heart:    Regular rate and rhythm, S1 and S2 normal, no murmur, rub  or gallop  Abdomen:     Soft, non-tender, No obvious masses, or organomegaly   Extremities:   Extremities warm, no cyanosis or edema    Skin:   No venostatic changes in lower extremities  Normal skin color, texture, and turgor   No rashes or lesions     *-*-*-*-*-*-*-*-*-*-*-*-*-*-*-*-*-*-*-*-*-*-*-*-*-*-*-*-*-*-*-*-*-*-*-*-*-*-*-*-*-*-*-*-*-*-*-*-*-*-*-*-*-*-  CURRENT MEDICATIONS LIST     Current Outpatient Medications:     acetaminophen (TYLENOL) 325 mg tablet, Take 1-2 tabs q6h PRN mild fever/pain, Disp: 90 tablet, Rfl: 0    aspirin 325 mg tablet, Take 1 tablet (325 mg total) by mouth daily, Disp: 90 tablet, Rfl: 0    cholecalciferol (VITAMIN D3) 1,000 units tablet, Take 5,000 Units by mouth daily, Disp: , Rfl:     LANTUS SOLOSTAR 100 units/mL injection pen, 36 Units daily at bedtime , Disp: , Rfl:     losartan (COZAAR) 50 mg tablet, Take 1 tablet (50 mg total) by mouth daily, Disp: 90 tablet, Rfl: 3    metFORMIN (GLUCOPHAGE) 1000 MG tablet, Take 1,000 mg by mouth 2 (two) times a day with meals, Disp: , Rfl:     Metoprolol Tartrate 37 5 MG TABS, Take 1 tablet (37 5 mg total) by mouth every 12 (twelve) hours, Disp: 180 tablet, Rfl: 3    rosuvastatin (CRESTOR) 40 MG tablet, Take 1 tablet (40 mg total) by mouth daily, Disp: 90 tablet, Rfl: 3    bisacodyl (DULCOLAX) 5 mg EC tablet, Take 2 tablets (10 mg total) by mouth daily as needed for constipation (Patient not taking: Reported on 10/11/2021), Disp: 60 tablet, Rfl: 0       ALLERGIES   No Known Allergies    *-*-*-*-*-*-*-*-*-*-*-*-*-*-*-*-*-*-*-*-*-*-*-*-*-*-*-*-*-*-*-*-*-*-*-*-*-*-*-*-*-*-*-*-*-*-*-*-*-*-*-*-*-*-  LABORATORY DATA   No results found for: NA  Potassium   Date Value Ref Range Status   09/29/2021 4 4 3 5 - 5 3 mmol/L Final   09/28/2021 4 0 3 5 - 5 3 mmol/L Final   09/27/2021 4 2 3 5 - 5 3 mmol/L Final     Chloride   Date Value Ref Range Status   09/29/2021 108 100 - 108 mmol/L Final   09/28/2021 110 (H) 100 - 108 mmol/L Final   09/27/2021 113 (H) 100 - 108 mmol/L Final     CO2   Date Value Ref Range Status   09/29/2021 24 21 - 32 mmol/L Final   09/28/2021 21 21 - 32 mmol/L Final   09/27/2021 23 21 - 32 mmol/L Final     CO2, i-STAT   Date Value Ref Range Status   09/27/2021 23 21 - 32 mmol/L Final   09/27/2021 23 21 - 32 mmol/L Final   09/27/2021 30 21 - 32 mmol/L Final     BUN   Date Value Ref Range Status   09/29/2021 36 (H) 5 - 25 mg/dL Final   09/28/2021 23 5 - 25 mg/dL Final   09/27/2021 18 5 - 25 mg/dL Final     Creatinine   Date Value Ref Range Status   09/29/2021 1 15 0 60 - 1 30 mg/dL Final     Comment:     Standardized to IDMS reference method   09/28/2021 0 98 0 60 - 1 30 mg/dL Final     Comment:     Standardized to IDMS reference method   09/27/2021 0 82 0 60 - 1 30 mg/dL Final     Comment:     Standardized to IDMS reference method     eGFR   Date Value Ref Range Status   09/29/2021 69 ml/min/1 73sq m Final   09/28/2021 83 ml/min/1 73sq m Final   09/27/2021 96 ml/min/1 73sq m Final     Glucose, i-STAT   Date Value Ref Range Status   09/27/2021 139 65 - 140 mg/dl Final   09/27/2021 150 (H) 65 - 140 mg/dl Final   09/27/2021 149 (H) 65 - 140 mg/dl Final     Calcium   Date Value Ref Range Status   09/29/2021 7 6 (L) 8 3 - 10 1 mg/dL Final   09/28/2021 7 8 (L) 8 3 - 10 1 mg/dL Final   09/27/2021 8 0 (L) 8 3 - 10 1 mg/dL Final     AST   Date Value Ref Range Status   09/23/2021 20 5 - 45 U/L Final     Comment:     Specimen collection should occur prior to Sulfasalazine administration due to the potential for falsely depressed results  09/22/2021 25 5 - 45 U/L Final     Comment:     Slightly Hemolyzed; Results May be Affected  Specimen collection should occur prior to Sulfasalazine administration due to the potential for falsely depressed results  ALT   Date Value Ref Range Status   09/23/2021 27 12 - 78 U/L Final     Comment:     Specimen collection should occur prior to Sulfasalazine administration due to the potential for falsely depressed results  09/22/2021 30 12 - 78 U/L Final     Comment:     Specimen collection should occur prior to Sulfasalazine administration due to the potential for falsely depressed results        Alkaline Phosphatase   Date Value Ref Range Status   09/23/2021 74 46 - 116 U/L Final   09/22/2021 84 46 - 116 U/L Final     Magnesium   Date Value Ref Range Status   09/29/2021 2 9 (H) 1 6 - 2 6 mg/dL Final   09/28/2021 2 9 (H) 1 6 - 2 6 mg/dL Final   09/24/2021 2 4 1 6 - 2 6 mg/dL Final     WBC   Date Value Ref Range Status   09/29/2021 13 39 (H) 4 31 - 10 16 Thousand/uL Final   09/28/2021 20 26 (H) 4 31 - 10 16 Thousand/uL Final   09/24/2021 7 41 4 31 - 10 16 Thousand/uL Final     Hemoglobin   Date Value Ref Range Status   09/29/2021 10 9 (L) 12 0 - 17 0 g/dL Final   09/28/2021 12 6 12 0 - 17 0 g/dL Final   09/27/2021 13 1 12 0 - 17 0 g/dL Final     Platelets   Date Value Ref Range Status   09/29/2021 189 149 - 390 Thousands/uL Final   09/28/2021 258 149 - 390 Thousands/uL Final   09/27/2021 198 149 - 390 Thousands/uL Final     PTT   Date Value Ref Range Status   09/27/2021 32 23 - 37 seconds Final     Comment:     Therapeutic Heparin Range =  60-90 seconds   09/27/2021 >210 (HH) 23 - 37 seconds Final     INR   Date Value Ref Range Status   09/27/2021 1 26 (H) 0 84 - 1 19 Final   09/25/2021 1 11 0 84 - 1 19 Final     No results found for: CKMB, DIGOXIN  No results found for: TSH  No results found for: CHOL   Hemoglobin A1C   Date Value Ref Range Status   01/06/2022 6 9 (H) <5 7 % Final     Comment:     Reference Range  Non-diabetic                     <5 7  Pre-diabetic                     5 7-6 4  Diabetic                         >=6 5  ADA target for diabetic control  <=7     Blood Culture   Date Value Ref Range Status   09/07/2019 No Growth After 5 Days  Final   09/07/2019 No Growth After 5 Days  Final     Gram Stain Result   Date Value Ref Range Status   09/08/2019 No polys seen (A)  Final   09/08/2019 2+ Gram positive cocci in pairs (A)  Final   09/07/2019 1+ Polys (A)  Final   09/07/2019 2+ Gram positive cocci in pairs (A)  Final     Wound Culture   Date Value Ref Range Status   09/08/2019 3+ Growth of Beta Hemolytic Streptococcus Group B (A)  Final     Comment: This organism is intrinisically susceptible to Penicillin  If sensitivites to other antibiotics are required, please call the Microbiology Department at [phone] within 5 days  09/08/2019 2+ Growth of Staphylococcus aureus (A)  Final   09/08/2019 Few Colonies of   Final     Comment:     Mixed Skin Audelia   09/07/2019 4+ Growth of Beta Hemolytic Streptococcus Group B (A)  Final     Comment: This organism is intrinisically susceptible to Penicillin  If sensitivites to other antibiotics are required, please call the Microbiology Department at [phone] within 5 days     09/07/2019 2+ Growth of Staphylococcus aureus (A)  Final   09/07/2019 Few Colonies of   Final     Comment:     Mixed Skin Audelia     MRSA Culture Only   Date Value Ref Range Status   2021   Final    No Methicillin Resistant Staphlyococcus aureus (MRSA) isolated       *-*-*-*-*-*-*-*-*-*-*-*-*-*-*-*-*-*-*-*-*-*-*-*-*-*-*-*-*-*-*-*-*-*-*-*-*-*-*-*-*-*-*-*-*-*-*-*-*-*-*-*-*-*-  PREVIOUS CARDIOLOGY & RADIOLOGY TEST RESULTS   I have personally reviewed pertinent results of cardiovascular tests noted below  Results for orders placed during the hospital encounter of 21    Echo complete with contrast if indicated    Narrative  [address]  [address]  [place], [address]  [phone]    Transthoracic Echocardiogram  2D, M-mode, Doppler, and Color Doppler    Study date:  23-Sep-2021    Patient: Evon Constantino  MR number: [id number]  Account number: [de-identified]  : 1960  Age: 62 years  Gender: Male  Status: Inpatient  Location: Bedside  Height: 67 in  Weight: 204 lb  BP: 119/ 56 mmHg    Indications: CAD  Diagnoses: I25 119 - Atherosclerotic heart disease of native coronary artery with unspecified angina pectoris    Sonographer:  Sonu Ortiz RDCS  Primary Physician:  Lorena Nguyen DO  Referring Physician:  Deng Medellin DO  Group:  [facility]  Interpreting Physician:  Angeles Ortiz MD    IMPRESSIONS:  Technical quality: Good  Cardiac rhythm: Sinus    1  Borderline dilated left ventricular cavity size, mildly increased wall thickness, moderately reduced left ventricular systolic function with regional wall motion abnormalities  Ejection fraction is estimated as around 38%  2  Normal right ventricular size and systolic function  3  Mild left atrial cavity enlargement  4  Aortic valve sclerosis with leaflet thickening and some focal calcification  No aortic valve stenosis or regurgitation  5  Mild mitral annular calcification and mitral valve leaflet sclerosis, mild mitral valve regurgitation  6  Trace to mild tricuspid valve regurgitation  7  Possible mild pulmonary hypertension   Estimated RVSP/PASP is 41 mmHg  8  Trace, hemodynamically insignificant pericardial effusion  No previous echocardiogram is available for comparison  SUMMARY    LEFT VENTRICLE:  Borderline dilated left ventricular cavity, mildly increased wall thickness, moderately reduced left ventricular systolic function with regional wall motion abnormalities  There is moderate hypokinesis of the anterior wall, anterolateral  wall, inferoseptal wall and inferolateral wall  Ejection fraction is determined as around 38%  Grade 1 diastolic dysfunction  Estimated left ventricular filling pressure is normal     RIGHT VENTRICLE:  Normal right ventricular size and systolic function  Estimated right ventricular systolic pressure is mildly increased, 41 mmHg  Mild pulmonary hypertension  LEFT ATRIUM:  Mild left atrial cavity enlargement  Intact interatrial septum  RIGHT ATRIUM:  Normal right atrial cavity size  MITRAL VALVE:  Mild mitral annular calcification  Mild thickening of mitral valve leaflets  Adequate leaflet mobility  Mild mitral valve regurgitation the    AORTIC VALVE:  Tricuspid aortic valve with sclerosis and leaflet thickening with some focal calcification  Adequate leaflet mobility  No aortic valve stenosis or regurgitation  TRICUSPID VALVE:  Trace to mild tricuspid valve regurgitation  PULMONIC VALVE:  No significant pulmonic valve regurgitation  AORTA:  Aortic root and proximal ascending aorta are normal in size on 2D imaging  IVC, HEPATIC VEINS:  Inferior vena cava is normal in size and demonstrates appropriate respiratory phasic changes in diameter  PERICARDIUM:  Trace, hemodynamically insignificant pericardial effusion  SUMMARY MEASUREMENTS  2D measurements:  Unspecified Anatomy:   %FS was 26 2 %  Ao Diam was 2 5 cm   EDV(Teich) was 136 7 ml   EF(Teich) was 51 %  ESV(Teich) was 67 ml  IVSd was 1 2 cm  LA Area was 21 cm2  LVEDV MOD A4C was 166 5 ml  LVEF MOD A4C was 45 5 %    LVESV MOD A4C  was 90 7 ml  LVIDd was 5 3 cm  LVIDs was 3 9 cm  LVLd A4C was 9 5 cm  LVLs A4C was 8 1 cm  LVPWd was 0 9 cm  RA Area was 11 8 cm2  RVIDd was 4 cm  SV MOD A4C was 75 8 ml   SV(Teich) was 69 8 ml   CW measurements:  Unspecified Anatomy:   AV Env  Ti was 300 7 ms   AV VTI was 35 5 cm  AV Vmax was 1 6 m/s  AV Vmean was 1 2 m/s  AV maxPG was 10 7 mmHg  AV meanPG was 6 2 mmHg  TR Vmax was 2 8 m/s   TR maxPG was 31 2 mmHg  MM measurements:  Unspecified Anatomy:   TAPSE was 1 7 cm  PW measurements:  Unspecified Anatomy:   DVI was 0 6   E' Sept was 0 1 m/s  E/E' Sept was 12 6   LVOT Env  Ti was 323 5 ms  LVOT VTI was 22 7 cm  LVOT Vmax was 1 m/s  LVOT Vmean was 0 7 m/s  LVOT maxPG was 4 4 mmHg  LVOT meanPG was 2 3 mmHg  MV A  Sadi was 0 8 m/s  MV Dec [place] was 4 7 m/s2  MV DecT was 170 2 ms   MV E Sadi was 0 8 m/s  MV E/A Ratio was 1   MV PHT was 49 3 ms  MVA By PHT was 4 5 cm2  HISTORY: PRIOR HISTORY: Hypertension  Diabetes  HLD  PROCEDURE: The procedure was performed at the bedside  This was a routine study  The transthoracic approach was used  The study included complete 2D imaging, M-mode, complete spectral Doppler, and color Doppler  The heart rate was 64 bpm,  at the start of the study  Images were obtained from the parasternal, apical, subcostal, and suprasternal notch acoustic windows  Image quality was adequate  LEFT VENTRICLE: Borderline dilated left ventricular cavity, mildly increased wall thickness, moderately reduced left ventricular systolic function with regional wall motion abnormalities  There is moderate hypokinesis of the anterior wall,  anterolateral wall, inferoseptal wall and inferolateral wall  Ejection fraction is determined as around 38%  Grade 1 diastolic dysfunction  Estimated left ventricular filling pressure is normal     RIGHT VENTRICLE: Normal right ventricular size and systolic function   Estimated right ventricular systolic pressure is mildly increased, 41 mmHg  Mild pulmonary hypertension  LEFT ATRIUM: Mild left atrial cavity enlargement  Intact interatrial septum  RIGHT ATRIUM: Normal right atrial cavity size  MITRAL VALVE: Mild mitral annular calcification  Mild thickening of mitral valve leaflets  Adequate leaflet mobility  Mild mitral valve regurgitation the    AORTIC VALVE: Tricuspid aortic valve with sclerosis and leaflet thickening with some focal calcification  Adequate leaflet mobility  No aortic valve stenosis or regurgitation  TRICUSPID VALVE: Trace to mild tricuspid valve regurgitation  PULMONIC VALVE: No significant pulmonic valve regurgitation  PERICARDIUM: Trace, hemodynamically insignificant pericardial effusion  AORTA: Aortic root and proximal ascending aorta are normal in size on 2D imaging  SYSTEMIC VEINS: IVC: Inferior vena cava is normal in size and demonstrates appropriate respiratory phasic changes in diameter  SYSTEM MEASUREMENT TABLES    2D  %FS: 26 2 %  Ao Diam: 2 5 cm  EDV(Teich): 136 7 ml  EF(Teich): 51 %  ESV(Teich): 67 ml  IVSd: 1 2 cm  LA Area: 21 cm2  LVEDV MOD A4C: 166 5 ml  LVEF MOD A4C: 45 5 %  LVESV MOD A4C: 90 7 ml  LVIDd: 5 3 cm  LVIDs: 3 9 cm  LVLd A4C: 9 5 cm  LVLs A4C: 8 1 cm  LVPWd: 0 9 cm  RA Area: 11 8 cm2  RVIDd: 4 cm  SV MOD A4C: 75 8 ml  SV(Teich): 69 8 ml    CW  AV Env  Ti: 300 7 ms  AV VTI: 35 5 cm  AV Vmax: 1 6 m/s  AV Vmean: 1 2 m/s  AV maxPG: 10 7 mmHg  AV meanP 2 mmHg  TR Vmax: 2 8 m/s  TR maxP 2 mmHg    MM  TAPSE: 1 7 cm    PW  DVI: 0 6  E' Sept: 0 1 m/s  E/E' Sept: 12 6  LVOT Env  Ti: 323 5 ms  LVOT VTI: 22 7 cm  LVOT Vmax: 1 m/s  LVOT Vmean: 0 7 m/s  LVOT maxP 4 mmHg  LVOT meanP 3 mmHg  MV A Sadi: 0 8 m/s  MV Dec [place]: 4 7 m/s2  MV DecT: 170 2 ms  MV E Sadi: 0 8 m/s  MV E/A Ratio: 1  MV PHT: 49 3 ms  MVA By PHT: 4 5 cm2    [facility]    Prepared and electronically signed by    Bart Arredondo MD  Signed 23-Sep-2021 15:51:44    No results found for this or any previous visit  No results found for this or any previous visit  No results found for this or any previous visit  Echo follow up/limited w/ contrast if indicated    Left Ventricle: Left ventricular cavity size is normal  Wall thickness   is mild to moderately increased  There is mild to moderate concentric   hypertrophy  The left ventricular ejection fraction is 52% by biplane   measurement  Systolic function is normal   at -12%  Wall motion is normal    Diastolic function is normal   Left atrial filling pressure is normal     Right Ventricle: Right ventricular cavity size is mildly dilated  Systolic function is low normal to mildly reduced    Left Atrium: The atrium is mildly dilated    Aortic Valve: The aortic valve is trileaflet  The leaflets are   moderately thickened  The leaflets are mildly calcified  There is mildly   reduced mobility    Mitral Valve: There is moderate annular calcification    Tricuspid Valve: There is mild to moderate regurgitation  The right   ventricular systolic pressure is mildly elevated  The estimated right   ventricular systolic pressure is 45 36 mmHg    Pulmonary Artery: The estimated pulmonary artery systolic pressure is   41 3 mmHg  The pulmonary artery systolic pressure is mildly increased  Strain was performed to quantify interventricular dyssynchrony and   evaluate components of myocardial function due to CAD and personal history   of CABG  Results from the utilization of Strain Analysis are listed in the   report below          *-*-*-*-*-*-*-*-*-*-*-*-*-*-*-*-*-*-*-*-*-*-*-*-*-*-*-*-*-*-*-*-*-*-*-*-*-*-*-*-*-*-*-*-*-*-*-*-*-*-*-*-*-*-  SIGNATURES:   @[facility]@   Lianna Lin MD; DANAA    *-*-*-*-*-*-*-*-*-*-*-*-*-*-*-*-*-*-*-*-*-*-*-*-*-*-*-*-*-*-*-*-*-*-*-*-*-*-*-*-*-*-*-*-*-*-*-*-*-*-*-*-*-*-  PAST MEDICAL HISTORY:  Past Medical History:   Diagnosis Date    Diabetes mellitus ([address] )     Hypertension     PAST SURGICAL HISTORY:  Past Surgical History:   Procedure Laterality Date    CARDIAC CATHETERIZATION  9/23/2021    INCISION AND DRAINAGE OF WOUND Right 9/8/2019    Procedure: INCISION AND DRAINAGE (I&D) EXTREMITY;  Surgeon: Rehana Gutierrez DPM;  Location: [facility];  Service: Podiatry    MS CABG, ARTERY-VEIN, TWO N/A 9/27/2021    Procedure: CORONARY ARTERY BYPASS GRAFT (CABG) 2 VESSELS MALKA to LAD -SVG--> L Posterior lateral, EVH from Left leg;  Surgeon: Nicolasa Pelayo MD;  Location:  MAIN OR;  Service: Cardiac Surgery         FAMILY HISTORY:  No family history on file   SOCIAL HISTORY:  Social History     Tobacco Use   Smoking Status Never Smoker   Smokeless Tobacco Never Used      Social History     Substance and Sexual Activity   Alcohol Use Not Currently    Alcohol/week: 30 0 standard drinks    Types: 30 Cans of beer per week    Comment: 5-6 beers 4-5 x per week      Social History     Substance and Sexual Activity   Drug Use Never    [unfilled]     *-*-*-*-*-*-*-*-*-*-*-*-*-*-*-*-*-*-*-*-*-*-*-*-*-*-*-*-*-*-*-*-*-*-*-*-*-*-*-*-*-*-*-*-*-*-*-*-*-*-*-*-*-*  ALLERGIES:  No Known Allergies CURRENT SCHEDULED MEDICATIONS:    Current Outpatient Medications:     acetaminophen (TYLENOL) 325 mg tablet, Take 1-2 tabs q6h PRN mild fever/pain, Disp: 90 tablet, Rfl: 0    aspirin 325 mg tablet, Take 1 tablet (325 mg total) by mouth daily, Disp: 90 tablet, Rfl: 0    cholecalciferol (VITAMIN D3) 1,000 units tablet, Take 5,000 Units by mouth daily, Disp: , Rfl:     LANTUS SOLOSTAR 100 units/mL injection pen, 36 Units daily at bedtime , Disp: , Rfl:     losartan (COZAAR) 50 mg tablet, Take 1 tablet (50 mg total) by mouth daily, Disp: 90 tablet, Rfl: 3    metFORMIN (GLUCOPHAGE) 1000 MG tablet, Take 1,000 mg by mouth 2 (two) times a day with meals, Disp: , Rfl:     Metoprolol Tartrate 37 5 MG TABS, Take 1 tablet (37 5 mg total) by mouth every 12 (twelve) hours, Disp: 180 tablet, Rfl: 3    rosuvastatin (CRESTOR) 40 MG tablet, Take 1 tablet (40 mg total) by mouth daily, Disp: 90 tablet, Rfl: 3    bisacodyl (DULCOLAX) 5 mg EC tablet, Take 2 tablets (10 mg total) by mouth daily as needed for constipation (Patient not taking: Reported on 10/11/2021), Disp: 60 tablet, Rfl: 0     *-*-*-*-*-*-*-*-*-*-*-*-*-*-*-*-*-*-*-*-*-*-*-*-*-*-*-*-*-*-*-*-*-*-*-*-*-*-*-*-*-*-*-*-*-*-*-*-*-*-*-*-*-*

## 2022-03-21 NOTE — PATIENT INSTRUCTIONS
CARDIOLOGY ASSESSMENT & PLAN     1  S/P CABG x 2     2  Ischemic cardiomyopathy     3  Essential hypertension     4  Coronary artery disease involving native coronary artery of native heart without angina pectoris       CAD (coronary artery disease)  Mr Johnathan Monzon appears to be overall doing well from cardiac perspective with no recent symptoms that suggest angina or heart failure and improved and good exercise tolerance  Though blood pressure today was slightly increased in office he mentions that blood pressures at home and during the his primary physician visit are normal   He was a little anxious to know about the results of his echocardiogram done recently and this may have contributed to increased blood pressure  Today we reviewed his recent echocardiogram which shows significant improvement in left ventricular ejection fraction  His diabetes seems to be reasonably well controlled  On examination there is no evidence of pulmonary vascular congestion or peripheral vascular congestion  There is no significant valvular heart disease  -- I am advising him to continue his current medical regimen  -- given his history of CAD and ischemic cardiomyopathy he may be a reasonable candidate to consider addition of SGLT2 inhibitor therapy for his diabetes  I am advising him to discuss this during his next routine visit with his primary physician  -- regarding the dose of aspirin I am advising him to continue the current dose of 325 mg once daily as there is some data that suggests that it helps with patency of his bypass grafts and is better compared to 81 mg daily especially in the initial few years following the bypass  -- Dietary and medical compliance are reinforced  -- He is advised  to report any concerning symptoms such as chest pain, shortness of breath, decline in exercise tolerance or presyncope/syncope

## 2022-10-17 NOTE — CONSULTS
Consultation - Quirino Baca 62 y o  male MRN: [id number]    Unit/Bed#: [facility] 409-01 Encounter: [id number]        Assessment: This is a 62y o -year-old male with pmhx of DM type 2, CAD s/p CABG, HTN, HLD, Obesity, cardiomyopathy admitted to the Hospital for CAD  Endocrinology consulted x DM type 2 management  Plan:    - pt feeling ok able to tolerate diet, will continue with insulin gtt for today  -Will bridge with Lantus 30 tonight, continue with the gtt for 2 hours after giving the Lantus and then discontinue  -Start with correctional insulin tomorrow  -Will consider to start short acting insulin tomorrow if needed with meals    CC: Diabetes Consult      HPI: Quirino Baca is a 62y o  year old male with type 2 DM ( diagnosed 3 years ago) with complications,on long term insulin, CAD, HTN, Hyperlipidemia, presents to the Hospital for Chest pain, pt underwent CAD and diagnosed with tripple vessel disease, pt underwent CABG  Pt reports that was diagnosed with DM type 2, 3 years ago, and had an toe amputation, pt reports tingling/numbness in hands, feet but denies any other associated symptoms such as polydipsia, polyuria, polyphagia, pt states that he is complaint with home regimen and checks glucose at home regulary    Home regimen:  Lantus 36 units bedtime, Metformin 1000 mg BID  Review of Systems   Constitutional: Negative for activity change and appetite change  HENT: Negative for congestion and facial swelling  Eyes: Negative for photophobia and discharge  Respiratory: Positive for chest tightness  Negative for apnea and shortness of breath  Cardiovascular: Positive for chest pain  Negative for palpitations  Gastrointestinal: Negative for abdominal distention and abdominal pain  Endocrine: Negative for polydipsia, polyphagia and polyuria  Musculoskeletal: Negative for arthralgias and back pain  Skin: Negative for color change and wound     Neurological: Negative for dizziness Detail Level: Detailed X Size Of Lesion In Cm (Optional): 0 and numbness  Psychiatric/Behavioral: Negative for agitation, behavioral problems and confusion  Historical Information   Past Medical History:   Diagnosis Date   • Diabetes mellitus ([address] )    • Hypertension      Past Surgical History:   Procedure Laterality Date   • CARDIAC CATHETERIZATION  9/23/2021   • INCISION AND DRAINAGE OF WOUND Right 9/8/2019    Procedure: INCISION AND DRAINAGE (I&D) EXTREMITY;  Surgeon: Lauryn Blair DPM;  Location: [facility];  Service: Podiatry   • DC CABG, ARTERY-VEIN, TWO N/A 9/27/2021    Procedure: CORONARY ARTERY BYPASS GRAFT (CABG) 2 VESSELS MALKA to LAD -SVG--> L Posterior lateral, EVH from Left leg;  Surgeon: Keven Martin MD;  [facility];  Service: Cardiac Surgery     Social History   Social History     Substance and Sexual Activity   Alcohol Use Yes   • Alcohol/week: 30 0 standard drinks   • Types: 30 Cans of beer per week    Comment: 5-6 beers 4-5 x per week      Social History     Substance and Sexual Activity   Drug Use Never     Social History     Tobacco Use   Smoking Status Never Smoker   Smokeless Tobacco Never Used     Family History: No family history on file      Meds/Allergies   Current Facility-Administered Medications   Medication Dose Route Frequency Provider Last Rate Last Admin   • acetaminophen (TYLENOL) tablet 975 mg  975 mg Oral Q8H Nuha Nassar PA-C   975 mg at 09/28/21 0424   • amiodarone tablet 200 mg  200 mg Oral [facility] Ambika Mcdonough PA-C   200 mg at 09/28/21 0885   • aspirin tablet 325 mg  325 mg Oral Daily Ambika Mcdonough PA-C   325 mg at 09/28/21 9362   • atorvastatin (LIPITOR) tablet 80 mg  80 mg Oral Daily With [facility] ANNE Sky       • bisacodyl (DULCOLAX) rectal suppository 10 mg  10 mg Rectal Daily PRN Ambika Mcdonough PA-C       • ceFAZolin (ANCEF) IVPB (premix in dextrose) 2,000 mg 50 mL  2,000 mg Intravenous Q8H Ambika Mcdonough PA-C   Stopped at 09/28/21 0400   • chlorhexidine (PERIDEX) 0 12 % Name Of The Referring Provider For Procedure: Trini Graham oral rinse 15 mL  15 mL Mouth/Throat BID Jayla Good PA-C   15 mL at 09/28/21 3551   • cholecalciferol (VITAMIN D3) tablet 1,000 Units  1,000 Units Oral Daily Jayla Good PA-C   1,000 Units at 09/28/21 0960   • docusate sodium (COLACE) capsule 100 mg  100 mg Oral BID Jayla Good PA-C   100 mg at 09/28/21 5301   • fondaparinux (ARIXTRA) subcutaneous injection 2 5 mg  2 5 mg Subcutaneous Daily Jayla Good PA-C   2 5 mg at 09/28/21 4137   • furosemide (LASIX) injection 40 mg  40 mg Intravenous Daily Jayla Good PA-C   40 mg at 09/28/21 7731   • furosemide (LASIX) injection 40 mg  40 mg Intravenous Once Ingris Santo PA-C       • insulin regular (HumuLIN R,NovoLIN R) 1 Units/mL in sodium chloride 0 9 % 100 mL infusion  0 3-21 Units/hr Intravenous Titrated Jayla Good PA-C 3 mL/hr at 09/28/21 1043 3 Units/hr at 09/28/21 1043   • lidocaine (cardiac) injection 100 mg  100 mg Intravenous Q30 Min PRN Jayla Good PA-C       • metoprolol tartrate (LOPRESSOR) partial tablet 12 5 mg  12 5 mg Oral Q12H [address] [place] [address], PAJessieC   12 5 mg at 09/28/21 2218   • mupirocin (BACTROBAN) 2 % nasal ointment 1 application  1 application Nasal D84R [address] [place] [address], PAJessieC   1 application at 26/32/94 0839   • ondansetron (ZOFRAN) injection 4 mg  4 mg Intravenous Q6H PRN Jayla Good PA-C       • oxyCODONE (ROXICODONE) IR tablet 2 5 mg  2 5 mg Oral Q4H PRN Jayla Good PA-C       • oxyCODONE (ROXICODONE) IR tablet 5 mg  5 mg Oral Q4H PRN Jayla Good PA-C   5 mg at 09/28/21 0717   • pantoprazole (PROTONIX) EC tablet 40 mg  40 mg Oral Daily Lehigh Musca, PA-C   40 mg at 09/28/21 7474   • polyethylene glycol (MIRALAX) packet 17 g  17 g Oral Daily Lehigh Muscjill, PA-C   17 g at 09/28/21 8775   • potassium chloride (K-DUR,KLOR-CON) CR tablet 20 mEq  20 mEq Oral Daily Lehigh Florentino, PA-C   20 mEq at 09/28/21 0672   • sodium chloride infusion 0 45 %  20 mL/hr Intravenous "Continuous Dipak Walden PA-C   Stopped at 09/28/21 0430   • temazepam (RESTORIL) capsule 15 mg  15 mg Oral HS PRN Dipak Walden PA-C         No Known Allergies    Objective   Vitals: Blood pressure 98/61, pulse 82, temperature 98 8 °F (37 1 °C), temperature source Oral, resp  rate 14, height 5' 7\" (1 702 m), weight 95 2 kg (209 lb 12 8 oz), SpO2 91 %  Intake/Output Summary (Last 24 hours) at 9/28/2021 1304  Last data filed at 9/28/2021 1227  Gross per 24 hour   Intake 1609 56 ml   Output 1755 ml   Net -145 44 ml     Invasive Devices     Central Venous Catheter Line            CVC Central Lines 09/27/21 Triple 1 day          Peripheral Intravenous Line            Peripheral IV 09/27/21 Right Antecubital 1 day          Line            Pacer Wires 1 day    Pacer Wires 1 day          Drain            Chest Tube 1 Left Pleural 32 Fr  1 day    Chest Tube 2 Posterior Mediastinal 32 Fr  1 day    Chest Tube 3 Anterior Mediastinal 32 Fr  1 day    Urethral Catheter Non-latex;Straight-tip; Temperature probe 16 Fr  1 day                Physical Exam  Constitutional:       General: He is not in acute distress  Appearance: He is not toxic-appearing  HENT:      Head: Normocephalic and atraumatic  Eyes:      Conjunctiva/sclera: Conjunctivae normal       Pupils: Pupils are equal, round, and reactive to light  Cardiovascular:      Rate and Rhythm: Normal rate  Heart sounds: No murmur heard  No gallop  Comments: Chest scar s/p CABG  Pulmonary:      Effort: Pulmonary effort is normal  No respiratory distress  Breath sounds: No rhonchi  Abdominal:      General: Abdomen is flat  There is no distension  Tenderness: There is no abdominal tenderness  Musculoskeletal:         General: Normal range of motion  Cervical back: Normal range of motion  No rigidity or tenderness  Right lower leg: No edema  Left lower leg: No edema  Skin:     General: Skin is warm        Coloration: Skin " Other Plan: efudex first Incorporate Mauc In Note: No is not jaundiced  Findings: No bruising  Neurological:      Mental Status: He is alert and oriented to person, place, and time  Cranial Nerves: No cranial nerve deficit  Motor: No weakness  Psychiatric:         Mood and Affect: Mood normal          Thought Content: Thought content normal          Judgment: Judgment normal              Lab Results:   Results from last 7 days   Lab Units 09/25/21  0103   HEMOGLOBIN A1C % 6 3*     Lab Results   Component Value Date    WBC 20 26 (H) 09/28/2021    HGB 12 6 09/28/2021    HCT 39 1 09/28/2021    MCV 91 09/28/2021     09/28/2021     Lab Results   Component Value Date/Time    BUN 23 09/28/2021 03:34 AM    K 4 0 09/28/2021 03:34 AM     (H) 09/28/2021 03:34 AM    CO2 21 09/28/2021 03:34 AM    CO2 23 09/27/2021 12:44 PM    CREATININE 0 98 09/28/2021 03:34 AM    AST 20 09/23/2021 02:19 AM    ALT 27 09/23/2021 02:19 AM    ALB 3 5 09/23/2021 02:19 AM     No results for input(s): CHOL, HDL, LDL, TRIG, VLDL in the last 72 hours  No results found for: Leon Carpio  POC Glucose (mg/dl)   Date Value   09/28/2021 226 (H)   09/28/2021 188 (H)   09/28/2021 117   09/28/2021 102   09/28/2021 105   09/28/2021 103   09/27/2021 108   09/27/2021 95   09/27/2021 98   09/27/2021 116         Portions of the record may have been created with voice recognition software  Name Of The Referring Provider For Procedure: Trini Graham

## 2022-12-09 DIAGNOSIS — Z95.1 S/P CABG X 2: ICD-10-CM

## 2022-12-09 DIAGNOSIS — I10 ESSENTIAL HYPERTENSION: ICD-10-CM

## 2022-12-09 DIAGNOSIS — I24.9 ACS (ACUTE CORONARY SYNDROME) (HCC): ICD-10-CM

## 2022-12-09 DIAGNOSIS — E11.42 TYPE 2 DIABETES MELLITUS WITH DIABETIC POLYNEUROPATHY, WITHOUT LONG-TERM CURRENT USE OF INSULIN (HCC): ICD-10-CM

## 2022-12-13 RX ORDER — ROSUVASTATIN CALCIUM 40 MG/1
TABLET, COATED ORAL
Qty: 90 TABLET | Refills: 3 | Status: SHIPPED | OUTPATIENT
Start: 2022-12-13

## 2022-12-13 RX ORDER — LOSARTAN POTASSIUM 50 MG/1
TABLET ORAL
Qty: 30 TABLET | Refills: 9 | Status: SHIPPED | OUTPATIENT
Start: 2022-12-13

## 2022-12-17 DIAGNOSIS — I24.9 ACS (ACUTE CORONARY SYNDROME) (HCC): ICD-10-CM

## 2022-12-17 DIAGNOSIS — Z95.1 S/P CABG X 2: ICD-10-CM

## 2022-12-17 DIAGNOSIS — E11.42 TYPE 2 DIABETES MELLITUS WITH DIABETIC POLYNEUROPATHY, WITHOUT LONG-TERM CURRENT USE OF INSULIN (HCC): ICD-10-CM

## 2022-12-21 RX ORDER — METOPROLOL TARTRATE 37.5 MG/1
TABLET, FILM COATED ORAL
Qty: 180 TABLET | Refills: 3 | Status: SHIPPED | OUTPATIENT
Start: 2022-12-21

## 2023-01-24 ENCOUNTER — OFFICE VISIT (OUTPATIENT)
Dept: CARDIOLOGY CLINIC | Facility: CLINIC | Age: 63
End: 2023-01-24

## 2023-01-24 VITALS
BODY MASS INDEX: 33.12 KG/M2 | HEART RATE: 61 BPM | DIASTOLIC BLOOD PRESSURE: 90 MMHG | HEIGHT: 67 IN | WEIGHT: 211 LBS | SYSTOLIC BLOOD PRESSURE: 146 MMHG

## 2023-01-24 DIAGNOSIS — E11.42 TYPE 2 DIABETES MELLITUS WITH DIABETIC POLYNEUROPATHY, WITHOUT LONG-TERM CURRENT USE OF INSULIN (HCC): ICD-10-CM

## 2023-01-24 DIAGNOSIS — I25.5 ISCHEMIC CARDIOMYOPATHY: ICD-10-CM

## 2023-01-24 DIAGNOSIS — I25.10 ATHEROSCLEROSIS OF NATIVE CORONARY ARTERY OF NATIVE HEART WITHOUT ANGINA PECTORIS: Primary | ICD-10-CM

## 2023-01-24 DIAGNOSIS — I10 ESSENTIAL HYPERTENSION: ICD-10-CM

## 2023-01-24 DIAGNOSIS — Z95.1 S/P CABG X 2: ICD-10-CM

## 2023-01-24 RX ORDER — ASPIRIN 81 MG/1
81 TABLET ORAL DAILY
Qty: 90 TABLET | Refills: 3 | Status: SHIPPED | OUTPATIENT
Start: 2023-01-24

## 2023-01-24 NOTE — ASSESSMENT & PLAN NOTE
Mr Michelle Olson is overall doing well from cardiac perspective with no recent symptoms that suggest angina or heart failure  Though his blood pressure is elevated today he mentions that he does have whitecoat syndrome and his home blood pressures are consistently normal   Physical examination is overall unremarkable  -- At this time I am advising him to continue current medications except that I am reducing the dose of aspirin to 81 mg daily  -- He is encouraged to continue physical activity and try to lose weight through dietary changes also  -- He will continue to follow with his primary care physician and cardiology follow-up will be arranged for 1 year  -- Dietary and medical compliance are reinforced  -- He is advised  to report any concerning symptoms such as chest pain, shortness of breath, decline in exercise tolerance or presyncope/syncope

## 2023-01-24 NOTE — PROGRESS NOTES
[facility]  [id number] [facility], [place] [address]  Phone#  [phone]  Fax#  [phone]  *-*-*-*-*-*-*-*-*-*-*-*-*-*-*-*-*-*-*-*-*-*-*-*-*-*-*-*-*-*-*-*-*-*-*-*-*-*-*-*-*-*-*-*-*-*-*-*-*-*-*-*-*-*  ENCOUNTER DATE: 01/24/23 8:27 AM  PATIENT NAME: Frank Bautista   1960    [id number]  AGE:62 y o  SEX: male  ENCOUNTER PROVIDER:Jasmyn Ojeda     PRIMARY CARE PHYSICIAN: Jeffry Almanza DO    DIAGNOSIS:  1  Significant double-vessel coronary artery disease including left main disease, status post acute MI September 23, 2021, now status post lima to LAD and SVG to left posterior lateral branch, with endovascular harvest from left leg (September 27, 2021)  2  Ischemic cardiomyopathy with suspected moderately reduced left ventricular systolic function  3  Diabetes mellitus  4  Essential hypertension  5  Anemia  6  Dyslipidemia  7  History of significant alcohol use  8  Vitamin-D deficiency  9  Obesity    ECHOCARDIOGRAM March 3, 2022: Mild to moderate concentric left ventricular hypertrophy, low-normal left ventricular systolic function, EF 54%, normal diastolic function, decreased global longitudinal strain,-12%, mildly dilated right ventricle with low-normal right ventricular systolic function, mild left atrial enlargement, aortic valve sclerosis and thickening with mildly reduced mobility without aortic valve stenosis  Moderate mitral annular calcification, mild to moderate tricuspid valve regurgitation, estimated RVSP/PASP around 35 mmHg, possible mild pulmonary hypertension, no pericardial effusion  CARDIAC CATHETERIZATION September 23, 2021  -- The coronary circulation is right dominant  -- Distal left main: There was a 90 % stenosis  The lesion was complex and heavily calcified  -- LAD: The vessel was medium sized and heavily calcified  The artery was shown to have competitive flow  -- Ostial LAD: There was a 90 % stenosis   There was EMPERATRIZ grade 2 flow through the vessel (partial perfusion)  -- Distal LAD: The distal vessel was supplied by moderate collaterals from the right posterior descending   -- 1st diagonal: The vessel was medium sized  There was a 90 % stenosis  -- Circumflex: The vessel was large sized  Angiography showed mild atherosclerosis  There were two major obtuse marginals  -- Ostial circumflex: There was a 90 % stenosis  -- RCA: The vessel was medium sized  Angiography showed mild atherosclerosis  ECHOCARDIOGRAM September 23, 2021:  1  Borderline dilated left ventricular cavity size, mildly increased wall thickness, moderately reduced left ventricular systolic function with regional wall motion abnormalities  Ejection fraction is estimated as around 38%  2  Normal right ventricular size and systolic function  3  Mild left atrial cavity enlargement  4  Aortic valve sclerosis with leaflet thickening and some focal calcification  No aortic valve stenosis or regurgitation  5  Mild mitral annular calcification and mitral valve leaflet sclerosis, mild mitral valve regurgitation  6  Trace to mild tricuspid valve regurgitation  7  Possible mild pulmonary hypertension  Estimated RVSP/PASP is 41 mmHg  8  Trace, hemodynamically insignificant pericardial effusion  CURRENT ECG     Results for orders placed or performed in visit on 01/24/23   POCT ECG    Narrative    Sinus rhythm with nonspecific ST-T wave abnormalities in lead I and aVL otherwise no abnormalities  No evidence of prior infarction, or chamber enlargement or hypertrophy  HR 61 bpm   Normal axis and intervals  CARDIOLOGY ASSESSMENT & PLAN     1  Atherosclerosis of native coronary artery of native heart without angina pectoris  aspirin (ECOTRIN LOW STRENGTH) 81 mg EC tablet      2  Ischemic cardiomyopathy        3  Essential hypertension  POCT ECG      4  Type 2 diabetes mellitus with diabetic polyneuropathy, without long-term current use of insulin ([address] )        5   S/P CABG x 2 Atherosclerosis of native coronary artery of native heart without angina pectoris  Mr Johnson Rader is overall doing well from cardiac perspective with no recent symptoms that suggest angina or heart failure  Though his blood pressure is elevated today he mentions that he does have whitecoat syndrome and his home blood pressures are consistently normal   Physical examination is overall unremarkable  -- At this time I am advising him to continue current medications except that I am reducing the dose of aspirin to 81 mg daily  -- He is encouraged to continue physical activity and try to lose weight through dietary changes also  -- He will continue to follow with his primary care physician and cardiology follow-up will be arranged for 1 year  -- Dietary and medical compliance are reinforced  -- He is advised  to report any concerning symptoms such as chest pain, shortness of breath, decline in exercise tolerance or presyncope/syncope  INTERVAL HISTORY & HISTORY OF PRESENT ILLNESS     Johnson Rader is here for follow-up regarding his cardiac comorbidities which include:  Coronary artery disease with history of MI and history of CABG in September 2021, primary hypertension, ischemic cardiomyopathy and other comorbidities  He was last seen by me in March 2022  He mentions that since his last visit he has had no new diagnoses or hospitalizations or other significant illnesses  From a symptom perspective he has had no significant symptoms  There have been no recent active symptoms of chest discomfort or exertional angina  There is no dyspnea with usual activities or exertion  No orthopnea, PND or pedal edema  No palpitations, lightheadedness, presyncope, or syncope  Denies any recent hospitalizations or other illnesses  Reports being compliant with medications  He reports that he monitors his blood pressures at home and they are consistently in 130s over 70s      Functional capacity status:  Excellent   (Excellent- >10 METs; Good: (7-10 METs); Moderate (4-7 METs); Poor (<= 4 METs)    Any chronic stressors: None   (feeling of poor health, financial problems, and social isolation etc)  Tobacco or alcohol dependence:  He does not smoke  Drinks now only on weekends  Current cardiac medications:  Aspirin 325 mg daily, metoprolol tartrate 37 5 mg twice daily, rosuvastatin 40 mg daily, losartan 50 mg daily  He is on insulin and metformin  Blood work from September 2022 at [facility] laboratories indicate sodium 140 potassium 4 7 chloride 109 bicarb 27 BUN 23 creatinine 0 94 GFR 92  Last hemoglobin A1c was 6 8  Total cholesterol 91 triglyceride 135 HDL direct 33, calculated LDL 31       REVIEW OF SYSTEMS   Positive for:  As noted above in HPI  Negative for: All remaining as reviewed below and in HPI  SYSTEM SYMPTOMS REVIEWED:  General--weight change, fever, night sweats  Respiratory--cough, wheezing, shortness of breath, sputum production  Cardiovascular--chest pain, syncope, dyspnea on exertion, edema, decline in exercise tolerance, claudication   Gastrointestinal--persistent vomiting, diarrhea, abdominal distention, blood in stool   Muscular or skeletal--joint pain or swelling   Neurologic--headaches, syncope, abnormal movement  Hematologic--history of easy bruising and bleeding   Endocrine--thyroid enlargement, heat or cold intolerance, polyuria   Psychiatric--anxiety, depression     *-*-*-*-*-*-*-*-*-*-*-*-*-*-*-*-*-*-*-*-*-*-*-*-*-*-*-*-*-*-*-*-*-*-*-*-*-*-*-*-*-*-*-*-*-*-*-*-*-*-*-*-*-*-  VITAL SIGNS     CURRENT VITAL SIGNS:   Vitals:    01/24/23 0803   BP: 146/90   BP Location: Right arm   Patient Position: Sitting   Cuff Size: Large   Pulse: 61   Weight: 95 7 kg (211 lb)   Height: 5' 7" (1 702 m)       BMI: Body mass index is 33 05 kg/m²      WEIGHTS:   Wt Readings from Last 25 Encounters:   01/24/23 95 7 kg (211 lb)   03/21/22 95 1 kg (209 lb 9 6 oz)   03/07/22 94 8 kg (209 lb)   12/07/21 94 8 kg (209 lb)   10/29/21 92 1 kg (203 lb)   10/11/21 90 kg (198 lb 6 4 oz)   09/30/21 93 9 kg (207 lb)   09/22/21 92 9 kg (204 lb 12 9 oz)   04/08/20 90 3 kg (199 lb)   09/07/19 90 7 kg (199 lb 15 3 oz)   07/29/19 89 6 kg (197 lb 8 5 oz)        *-*-*-*-*-*-*-*-*-*-*-*-*-*-*-*-*-*-*-*-*-*-*-*-*-*-*-*-*-*-*-*-*-*-*-*-*-*-*-*-*-*-*-*-*-*-*-*-*-*-*-*-*-*-  PHYSICAL EXAM     General Appearance:    Alert, cooperative, no distress, appears stated age   Head, Eyes, ENT:    No obvious abnormality, moist mucous mebranes  Neck:   Supple, no carotid bruit or JVD   Back:     Symmetric, no curvature  Lungs:     Respirations unlabored  Clear to auscultation bilaterally,    Chest wall:    No tenderness or deformity   Heart:    Regular rate and rhythm, normal intensity heart sounds l, no murmur, rub  or gallop  Abdomen:     Soft, non-tender,   Extremities:   Extremities warm, no cyanosis or edema    Skin:   No venostatic changes in lower extremities  Normal skin color, texture, and turgor   No rashes or lesions     *-*-*-*-*-*-*-*-*-*-*-*-*-*-*-*-*-*-*-*-*-*-*-*-*-*-*-*-*-*-*-*-*-*-*-*-*-*-*-*-*-*-*-*-*-*-*-*-*-*-*-*-*-*-  CURRENT MEDICATIONS LIST     Current Outpatient Medications:   •  acetaminophen (TYLENOL) 325 mg tablet, Take 1-2 tabs q6h PRN mild fever/pain, Disp: 90 tablet, Rfl: 0  •  aspirin (ECOTRIN LOW STRENGTH) 81 mg EC tablet, Take 1 tablet (81 mg total) by mouth daily, Disp: 90 tablet, Rfl: 3  •  cholecalciferol (VITAMIN D3) 1,000 units tablet, Take 5,000 Units by mouth daily, Disp: , Rfl:   •  LANTUS SOLOSTAR 100 units/mL injection pen, 36 Units daily at bedtime , Disp: , Rfl:   •  losartan (COZAAR) 50 mg tablet, TAKE 1 TABLET BY MOUTH EVERY DAY, Disp: 30 tablet, Rfl: 9  •  metFORMIN (GLUCOPHAGE) 1000 MG tablet, Take 1,000 mg by mouth 2 (two) times a day with meals, Disp: , Rfl:   •  Metoprolol Tartrate 37 5 MG TABS, TAKE 1 TABLET BY MOUTH EVERY 12 HOURS, Disp: 180 tablet, Rfl: 3  •  rosuvastatin (CRESTOR) 40 MG tablet, TAKE 1 TABLET BY MOUTH EVERY DAY, Disp: 90 tablet, Rfl: 3  •  bisacodyl (DULCOLAX) 5 mg EC tablet, Take 2 tablets (10 mg total) by mouth daily as needed for constipation (Patient not taking: Reported on 10/11/2021), Disp: 60 tablet, Rfl: 0       ALLERGIES   No Known Allergies    *-*-*-*-*-*-*-*-*-*-*-*-*-*-*-*-*-*-*-*-*-*-*-*-*-*-*-*-*-*-*-*-*-*-*-*-*-*-*-*-*-*-*-*-*-*-*-*-*-*-*-*-*-*-  LABORATORY DATA   No results found for: NA  Potassium   Date Value Ref Range Status   09/29/2021 4 4 3 5 - 5 3 mmol/L Final   09/28/2021 4 0 3 5 - 5 3 mmol/L Final   09/27/2021 4 2 3 5 - 5 3 mmol/L Final     Chloride   Date Value Ref Range Status   09/29/2021 108 100 - 108 mmol/L Final   09/28/2021 110 (H) 100 - 108 mmol/L Final   09/27/2021 113 (H) 100 - 108 mmol/L Final     CO2   Date Value Ref Range Status   09/29/2021 24 21 - 32 mmol/L Final   09/28/2021 21 21 - 32 mmol/L Final   09/27/2021 23 21 - 32 mmol/L Final     CO2, i-STAT   Date Value Ref Range Status   09/27/2021 23 21 - 32 mmol/L Final   09/27/2021 23 21 - 32 mmol/L Final   09/27/2021 30 21 - 32 mmol/L Final     BUN   Date Value Ref Range Status   09/29/2021 36 (H) 5 - 25 mg/dL Final   09/28/2021 23 5 - 25 mg/dL Final   09/27/2021 18 5 - 25 mg/dL Final     Creatinine   Date Value Ref Range Status   09/29/2021 1 15 0 60 - 1 30 mg/dL Final     Comment:     Standardized to IDMS reference method   09/28/2021 0 98 0 60 - 1 30 mg/dL Final     Comment:     Standardized to IDMS reference method   09/27/2021 0 82 0 60 - 1 30 mg/dL Final     Comment:     Standardized to IDMS reference method     eGFR   Date Value Ref Range Status   09/29/2021 69 ml/min/1 73sq m Final   09/28/2021 83 ml/min/1 73sq m Final   09/27/2021 96 ml/min/1 73sq m Final     Glucose, i-STAT   Date Value Ref Range Status   09/27/2021 139 65 - 140 mg/dl Final   09/27/2021 150 (H) 65 - 140 mg/dl Final   09/27/2021 149 (H) 65 - 140 mg/dl Final     Calcium   Date Value Ref Range Status 09/29/2021 7 6 (L) 8 3 - 10 1 mg/dL Final   09/28/2021 7 8 (L) 8 3 - 10 1 mg/dL Final   09/27/2021 8 0 (L) 8 3 - 10 1 mg/dL Final     AST   Date Value Ref Range Status   09/23/2021 20 5 - 45 U/L Final     Comment:     Specimen collection should occur prior to Sulfasalazine administration due to the potential for falsely depressed results  09/22/2021 25 5 - 45 U/L Final     Comment:     Slightly Hemolyzed; Results May be Affected  Specimen collection should occur prior to Sulfasalazine administration due to the potential for falsely depressed results  ALT   Date Value Ref Range Status   09/23/2021 27 12 - 78 U/L Final     Comment:     Specimen collection should occur prior to Sulfasalazine administration due to the potential for falsely depressed results  09/22/2021 30 12 - 78 U/L Final     Comment:     Specimen collection should occur prior to Sulfasalazine administration due to the potential for falsely depressed results        Alkaline Phosphatase   Date Value Ref Range Status   09/23/2021 74 46 - 116 U/L Final   09/22/2021 84 46 - 116 U/L Final     Magnesium   Date Value Ref Range Status   09/29/2021 2 9 (H) 1 6 - 2 6 mg/dL Final   09/28/2021 2 9 (H) 1 6 - 2 6 mg/dL Final   09/24/2021 2 4 1 6 - 2 6 mg/dL Final     WBC   Date Value Ref Range Status   09/29/2021 13 39 (H) 4 31 - 10 16 Thousand/uL Final   09/28/2021 20 26 (H) 4 31 - 10 16 Thousand/uL Final   09/24/2021 7 41 4 31 - 10 16 Thousand/uL Final     Hemoglobin   Date Value Ref Range Status   09/29/2021 10 9 (L) 12 0 - 17 0 g/dL Final   09/28/2021 12 6 12 0 - 17 0 g/dL Final   09/27/2021 13 1 12 0 - 17 0 g/dL Final     Platelets   Date Value Ref Range Status   09/29/2021 189 149 - 390 Thousands/uL Final   09/28/2021 258 149 - 390 Thousands/uL Final   09/27/2021 198 149 - 390 Thousands/uL Final     PTT   Date Value Ref Range Status   09/27/2021 32 23 - 37 seconds Final     Comment:     Therapeutic Heparin Range =  60-90 seconds   09/27/2021 >210 (HH) 23 - 37 seconds Final     INR   Date Value Ref Range Status   09/27/2021 1 26 (H) 0 84 - 1 19 Final   09/25/2021 1 11 0 84 - 1 19 Final     No results found for: CKMB, DIGOXIN  No results found for: TSH  No results found for: CHOL   Hemoglobin A1C   Date Value Ref Range Status   09/01/2022 6 8 (H) <5 7 % Final     Comment:     Reference Range  Non-diabetic                     <5 7  Pre-diabetic                     5 7-6 4  Diabetic                         >=6 5  ADA target for diabetic control  <=7     Blood Culture   Date Value Ref Range Status   09/07/2019 No Growth After 5 Days  Final   09/07/2019 No Growth After 5 Days  Final     Gram Stain Result   Date Value Ref Range Status   09/08/2019 No polys seen (A)  Final   09/08/2019 2+ Gram positive cocci in pairs (A)  Final   09/07/2019 1+ Polys (A)  Final   09/07/2019 2+ Gram positive cocci in pairs (A)  Final     Wound Culture   Date Value Ref Range Status   09/08/2019 3+ Growth of Beta Hemolytic Streptococcus Group B (A)  Final     Comment: This organism is intrinisically susceptible to Penicillin  If sensitivites to other antibiotics are required, please call the Microbiology Department at [phone] within 5 days  09/08/2019 2+ Growth of Staphylococcus aureus (A)  Final   09/08/2019 Few Colonies of  Final     Comment:     Mixed Skin Audelia   09/07/2019 4+ Growth of Beta Hemolytic Streptococcus Group B (A)  Final     Comment: This organism is intrinisically susceptible to Penicillin  If sensitivites to other antibiotics are required, please call the Microbiology Department at [phone] within 5 days     09/07/2019 2+ Growth of Staphylococcus aureus (A)  Final   09/07/2019 Few Colonies of  Final     Comment:     Mixed Skin Audelia     MRSA Culture Only   Date Value Ref Range Status   09/24/2021   Final    No Methicillin Resistant Staphlyococcus aureus (MRSA) isolated *-*-*-*-*-*-*-*-*-*-*-*-*-*-*-*-*-*-*-*-*-*-*-*-*-*-*-*-*-*-*-*-*-*-*-*-*-*-*-*-*-*-*-*-*-*-*-*-*-*-*-*-*-*-  PREVIOUS CARDIOLOGY & RADIOLOGY TEST RESULTS   I have personally reviewed pertinent results of cardiovascular tests noted below  Results for orders placed during the hospital encounter of 21    Echo complete with contrast if indicated    Narrative  [address], [address]  [phone]    Transthoracic Echocardiogram  2D, M-mode, Doppler, and Color Doppler    Study date:  23-Sep-2021    Patient: Sunita Howe  MR number: [id number]  Account number: [de-identified]  : 1960  Age: 62 years  Gender: Male  Status: Inpatient  Location: Bedside  Height: 67 in  Weight: 204 lb  BP: 119/ 56 mmHg    Indications: CAD  Diagnoses: I25 119 - Atherosclerotic heart disease of native coronary artery with unspecified angina pectoris    Sonographer:  Bel Tejeda RDCS  Primary Physician:  Marilyn Wu DO  Referring Physician:  Jaquelin Snider DO  Group:  [facility]  Interpreting Physician:  Royal Finch MD    IMPRESSIONS:  Technical quality: Good  Cardiac rhythm: Sinus    1  Borderline dilated left ventricular cavity size, mildly increased wall thickness, moderately reduced left ventricular systolic function with regional wall motion abnormalities  Ejection fraction is estimated as around 38%  2  Normal right ventricular size and systolic function  3  Mild left atrial cavity enlargement  4  Aortic valve sclerosis with leaflet thickening and some focal calcification  No aortic valve stenosis or regurgitation  5  Mild mitral annular calcification and mitral valve leaflet sclerosis, mild mitral valve regurgitation  6  Trace to mild tricuspid valve regurgitation  7  Possible mild pulmonary hypertension  Estimated RVSP/PASP is 41 mmHg  8  Trace, hemodynamically insignificant pericardial effusion      No previous echocardiogram is available for comparison  SUMMARY    LEFT VENTRICLE:  Borderline dilated left ventricular cavity, mildly increased wall thickness, moderately reduced left ventricular systolic function with regional wall motion abnormalities  There is moderate hypokinesis of the anterior wall, anterolateral  wall, inferoseptal wall and inferolateral wall  Ejection fraction is determined as around 38%  Grade 1 diastolic dysfunction  Estimated left ventricular filling pressure is normal     RIGHT VENTRICLE:  Normal right ventricular size and systolic function  Estimated right ventricular systolic pressure is mildly increased, 41 mmHg  Mild pulmonary hypertension  LEFT ATRIUM:  Mild left atrial cavity enlargement  Intact interatrial septum  RIGHT ATRIUM:  Normal right atrial cavity size  MITRAL VALVE:  Mild mitral annular calcification  Mild thickening of mitral valve leaflets  Adequate leaflet mobility  Mild mitral valve regurgitation the    AORTIC VALVE:  Tricuspid aortic valve with sclerosis and leaflet thickening with some focal calcification  Adequate leaflet mobility  No aortic valve stenosis or regurgitation  TRICUSPID VALVE:  Trace to mild tricuspid valve regurgitation  PULMONIC VALVE:  No significant pulmonic valve regurgitation  AORTA:  Aortic root and proximal ascending aorta are normal in size on 2D imaging  IVC, HEPATIC VEINS:  Inferior vena cava is normal in size and demonstrates appropriate respiratory phasic changes in diameter  PERICARDIUM:  Trace, hemodynamically insignificant pericardial effusion  SUMMARY MEASUREMENTS  2D measurements:  Unspecified Anatomy:   %FS was 26 2 %  Ao Diam was 2 5 cm   EDV(Teich) was 136 7 ml   EF(Teich) was 51 %  ESV(Teich) was 67 ml  IVSd was 1 2 cm  LA Area was 21 cm2  LVEDV MOD A4C was 166 5 ml  LVEF MOD A4C was 45 5 %  LVESV MOD A4C  was 90 7 ml  LVIDd was 5 3 cm  LVIDs was 3 9 cm  LVLd A4C was 9 5 cm  LVLs A4C was 8 1 cm  LVPWd was 0 9 cm  RA Area was 11 8 cm2  RVIDd was 4 cm  SV MOD A4C was 75 8 ml   SV(Teich) was 69 8 ml   CW measurements:  Unspecified Anatomy:   AV Env  Ti was 300 7 ms   AV VTI was 35 5 cm  AV Vmax was 1 6 m/s  AV Vmean was 1 2 m/s  AV maxPG was 10 7 mmHg  AV meanPG was 6 2 mmHg  TR Vmax was 2 8 m/s   TR maxPG was 31 2 mmHg  MM measurements:  Unspecified Anatomy:   TAPSE was 1 7 cm  PW measurements:  Unspecified Anatomy:   DVI was 0 6   E' Sept was 0 1 m/s  E/E' Sept was 12 6   LVOT Env  Ti was 323 5 ms  LVOT VTI was 22 7 cm  LVOT Vmax was 1 m/s  LVOT Vmean was 0 7 m/s  LVOT maxPG was 4 4 mmHg  LVOT meanPG was 2 3 mmHg  MV A  Sadi was 0 8 m/s  MV Dec [place] was 4 7 m/s2  MV DecT was 170 2 ms   MV E Sadi was 0 8 m/s  MV E/A Ratio was 1   MV PHT was 49 3 ms  MVA By PHT was 4 5 cm2  HISTORY: PRIOR HISTORY: Hypertension  Diabetes  HLD  PROCEDURE: The procedure was performed at the bedside  This was a routine study  The transthoracic approach was used  The study included complete 2D imaging, M-mode, complete spectral Doppler, and color Doppler  The heart rate was 64 bpm,  at the start of the study  Images were obtained from the parasternal, apical, subcostal, and suprasternal notch acoustic windows  Image quality was adequate  LEFT VENTRICLE: Borderline dilated left ventricular cavity, mildly increased wall thickness, moderately reduced left ventricular systolic function with regional wall motion abnormalities  There is moderate hypokinesis of the anterior wall,  anterolateral wall, inferoseptal wall and inferolateral wall  Ejection fraction is determined as around 38%  Grade 1 diastolic dysfunction  Estimated left ventricular filling pressure is normal     RIGHT VENTRICLE: Normal right ventricular size and systolic function  Estimated right ventricular systolic pressure is mildly increased, 41 mmHg  Mild pulmonary hypertension  LEFT ATRIUM: Mild left atrial cavity enlargement   Intact interatrial septum  RIGHT ATRIUM: Normal right atrial cavity size  MITRAL VALVE: Mild mitral annular calcification  Mild thickening of mitral valve leaflets  Adequate leaflet mobility  Mild mitral valve regurgitation the    AORTIC VALVE: Tricuspid aortic valve with sclerosis and leaflet thickening with some focal calcification  Adequate leaflet mobility  No aortic valve stenosis or regurgitation  TRICUSPID VALVE: Trace to mild tricuspid valve regurgitation  PULMONIC VALVE: No significant pulmonic valve regurgitation  PERICARDIUM: Trace, hemodynamically insignificant pericardial effusion  AORTA: Aortic root and proximal ascending aorta are normal in size on 2D imaging  SYSTEMIC VEINS: IVC: Inferior vena cava is normal in size and demonstrates appropriate respiratory phasic changes in diameter  SYSTEM MEASUREMENT TABLES    2D  %FS: 26 2 %  Ao Diam: 2 5 cm  EDV(Teich): 136 7 ml  EF(Teich): 51 %  ESV(Teich): 67 ml  IVSd: 1 2 cm  LA Area: 21 cm2  LVEDV MOD A4C: 166 5 ml  LVEF MOD A4C: 45 5 %  LVESV MOD A4C: 90 7 ml  LVIDd: 5 3 cm  LVIDs: 3 9 cm  LVLd A4C: 9 5 cm  LVLs A4C: 8 1 cm  LVPWd: 0 9 cm  RA Area: 11 8 cm2  RVIDd: 4 cm  SV MOD A4C: 75 8 ml  SV(Teich): 69 8 ml    CW  AV Env  Ti: 300 7 ms  AV VTI: 35 5 cm  AV Vmax: 1 6 m/s  AV Vmean: 1 2 m/s  AV maxPG: 10 7 mmHg  AV meanP 2 mmHg  TR Vmax: 2 8 m/s  TR maxP 2 mmHg    MM  TAPSE: 1 7 cm    PW  DVI: 0 6  E' Sept: 0 1 m/s  E/E' Sept: 12 6  LVOT Env  Ti: 323 5 ms  LVOT VTI: 22 7 cm  LVOT Vmax: 1 m/s  LVOT Vmean: 0 7 m/s  LVOT maxP 4 mmHg  LVOT meanP 3 mmHg  MV A Sadi: 0 8 m/s  MV Dec [place]: 4 7 m/s2  MV DecT: 170 2 ms  MV E Sadi: 0 8 m/s  MV E/A Ratio: 1  MV PHT: 49 3 ms  MVA By PHT: 4 5 cm2    [facility] Accredited Echocardiography Laboratory    Prepared and electronically signed by    Melba Miller MD  Signed 23-Sep-2021 15:51:44    No results found for this or any previous visit      No results found for this or any previous visit  No results found for this or any previous visit  Echo follow up/limited w/ contrast if indicated  •  Left Ventricle: Left ventricular cavity size is normal  Wall thickness   is mild to moderately increased  There is mild to moderate concentric   hypertrophy  The left ventricular ejection fraction is 52% by biplane   measurement  Systolic function is normal   at -12%  Wall motion is normal    Diastolic function is normal   Left atrial filling pressure is normal   •  Right Ventricle: Right ventricular cavity size is mildly dilated  Systolic function is low normal to mildly reduced  •  Left Atrium: The atrium is mildly dilated  •  Aortic Valve: The aortic valve is trileaflet  The leaflets are   moderately thickened  The leaflets are mildly calcified  There is mildly   reduced mobility  •  Mitral Valve: There is moderate annular calcification  •  Tricuspid Valve: There is mild to moderate regurgitation  The right   ventricular systolic pressure is mildly elevated  The estimated right   ventricular systolic pressure is 75 67 mmHg  •  Pulmonary Artery: The estimated pulmonary artery systolic pressure is   04 0 mmHg  The pulmonary artery systolic pressure is mildly increased  Strain was performed to quantify interventricular dyssynchrony and   evaluate components of myocardial function due to CAD and personal history   of CABG  Results from the utilization of Strain Analysis are listed in the   report below          *-*-*-*-*-*-*-*-*-*-*-*-*-*-*-*-*-*-*-*-*-*-*-*-*-*-*-*-*-*-*-*-*-*-*-*-*-*-*-*-*-*-*-*-*-*-*-*-*-*-*-*-*-*-  SIGNATURES:   @[facility]@   Giselle Paz MD; MHA    *-*-*-*-*-*-*-*-*-*-*-*-*-*-*-*-*-*-*-*-*-*-*-*-*-*-*-*-*-*-*-*-*-*-*-*-*-*-*-*-*-*-*-*-*-*-*-*-*-*-*-*-*-*-  PAST MEDICAL HISTORY:  Past Medical History:   Diagnosis Date   • Diabetes mellitus ([address] )    • Hypertension     PAST SURGICAL HISTORY:  Past Surgical History:   Procedure Laterality Date   • CARDIAC CATHETERIZATION 9/23/2021   • INCISION AND DRAINAGE OF WOUND Right 9/8/2019    Procedure: INCISION AND DRAINAGE (I&D) EXTREMITY;  Surgeon: Sae Jimenez DPM;  Location: [facility];  Service: Podiatry   • VT CABG, ARTERY-VEIN, TWO N/A 9/27/2021    Procedure: CORONARY ARTERY BYPASS GRAFT (CABG) 2 VESSELS MALKA to LAD -SVG--> L Posterior lateral, EVH from Left leg;  Surgeon: Jeevan Cohen MD;  Location: [facility];  Service: Cardiac Surgery         FAMILY HISTORY:  No family history on file   SOCIAL HISTORY:  Social History     Tobacco Use   Smoking Status Never   Smokeless Tobacco Never      Social History     Substance and Sexual Activity   Alcohol Use Not Currently   • Alcohol/week: 30 0 standard drinks   • Types: 30 Cans of beer per week    Comment: 5-6 beers 4-5 x per week      Social History     Substance and Sexual Activity   Drug Use Never    [unfilled]     *-*-*-*-*-*-*-*-*-*-*-*-*-*-*-*-*-*-*-*-*-*-*-*-*-*-*-*-*-*-*-*-*-*-*-*-*-*-*-*-*-*-*-*-*-*-*-*-*-*-*-*-*-*  ALLERGIES:  No Known Allergies CURRENT SCHEDULED MEDICATIONS:    Current Outpatient Medications:   •  acetaminophen (TYLENOL) 325 mg tablet, Take 1-2 tabs q6h PRN mild fever/pain, Disp: 90 tablet, Rfl: 0  •  aspirin (ECOTRIN LOW STRENGTH) 81 mg EC tablet, Take 1 tablet (81 mg total) by mouth daily, Disp: 90 tablet, Rfl: 3  •  cholecalciferol (VITAMIN D3) 1,000 units tablet, Take 5,000 Units by mouth daily, Disp: , Rfl:   •  LANTUS SOLOSTAR 100 units/mL injection pen, 36 Units daily at bedtime , Disp: , Rfl:   •  losartan (COZAAR) 50 mg tablet, TAKE 1 TABLET BY MOUTH EVERY DAY, Disp: 30 tablet, Rfl: 9  •  metFORMIN (GLUCOPHAGE) 1000 MG tablet, Take 1,000 mg by mouth 2 (two) times a day with meals, Disp: , Rfl:   •  Metoprolol Tartrate 37 5 MG TABS, TAKE 1 TABLET BY MOUTH EVERY 12 HOURS, Disp: 180 tablet, Rfl: 3  •  rosuvastatin (CRESTOR) 40 MG tablet, TAKE 1 TABLET BY MOUTH EVERY DAY, Disp: 90 tablet, Rfl: 3  •  bisacodyl (DULCOLAX) 5 mg EC tablet, Take 2 tablets (10 mg total) by mouth daily as needed for constipation (Patient not taking: Reported on 10/11/2021), Disp: 60 tablet, Rfl: 0     *-*-*-*-*-*-*-*-*-*-*-*-*-*-*-*-*-*-*-*-*-*-*-*-*-*-*-*-*-*-*-*-*-*-*-*-*-*-*-*-*-*-*-*-*-*-*-*-*-*-*-*-*-*

## 2023-01-24 NOTE — PATIENT INSTRUCTIONS
CARDIOLOGY ASSESSMENT & PLAN     1  Atherosclerosis of native coronary artery of native heart without angina pectoris  aspirin (ECOTRIN LOW STRENGTH) 81 mg EC tablet      2  Ischemic cardiomyopathy        3  Essential hypertension  POCT ECG      4  Type 2 diabetes mellitus with diabetic polyneuropathy, without long-term current use of insulin ([address] )        5  S/P CABG x 2          Atherosclerosis of native coronary artery of native heart without angina pectoris  Mr Elise Crow is overall doing well from cardiac perspective with no recent symptoms that suggest angina or heart failure  Though his blood pressure is elevated today he mentions that he does have whitecoat syndrome and his home blood pressures are consistently normal   Physical examination is overall unremarkable  -- At this time I am advising him to continue current medications except that I am reducing the dose of aspirin to 81 mg daily  -- He is encouraged to continue physical activity and try to lose weight through dietary changes also  -- He will continue to follow with his primary care physician and cardiology follow-up will be arranged for 1 year  -- Dietary and medical compliance are reinforced  -- He is advised  to report any concerning symptoms such as chest pain, shortness of breath, decline in exercise tolerance or presyncope/syncope

## 2023-09-21 DIAGNOSIS — Z95.1 S/P CABG X 2: ICD-10-CM

## 2023-09-21 DIAGNOSIS — I10 ESSENTIAL HYPERTENSION: ICD-10-CM

## 2023-09-22 RX ORDER — LOSARTAN POTASSIUM 50 MG/1
50 TABLET ORAL DAILY
Qty: 90 TABLET | Refills: 3 | Status: SHIPPED | OUTPATIENT
Start: 2023-09-22

## 2023-11-04 DIAGNOSIS — I24.9 ACS (ACUTE CORONARY SYNDROME) (HCC): ICD-10-CM

## 2023-11-04 DIAGNOSIS — Z95.1 S/P CABG X 2: ICD-10-CM

## 2023-11-04 DIAGNOSIS — E11.42 TYPE 2 DIABETES MELLITUS WITH DIABETIC POLYNEUROPATHY, WITHOUT LONG-TERM CURRENT USE OF INSULIN (HCC): ICD-10-CM

## 2023-11-08 RX ORDER — ROSUVASTATIN CALCIUM 40 MG/1
TABLET, COATED ORAL
Qty: 90 TABLET | Refills: 3 | Status: SHIPPED | OUTPATIENT
Start: 2023-11-08

## 2023-12-04 DIAGNOSIS — I24.9 ACS (ACUTE CORONARY SYNDROME) (HCC): ICD-10-CM

## 2023-12-04 DIAGNOSIS — Z95.1 S/P CABG X 2: ICD-10-CM

## 2023-12-04 DIAGNOSIS — E11.42 TYPE 2 DIABETES MELLITUS WITH DIABETIC POLYNEUROPATHY, WITHOUT LONG-TERM CURRENT USE OF INSULIN (HCC): ICD-10-CM

## 2023-12-06 RX ORDER — METOPROLOL TARTRATE 37.5 MG/1
TABLET, FILM COATED ORAL
Qty: 180 TABLET | Refills: 3 | Status: SHIPPED | OUTPATIENT
Start: 2023-12-06

## 2024-01-09 ENCOUNTER — OFFICE VISIT (OUTPATIENT)
Dept: FAMILY MEDICINE CLINIC | Facility: CLINIC | Age: 64
End: 2024-01-09
Payer: COMMERCIAL

## 2024-01-09 VITALS
WEIGHT: 213.2 LBS | TEMPERATURE: 97.9 F | HEIGHT: 67 IN | OXYGEN SATURATION: 98 % | HEART RATE: 60 BPM | DIASTOLIC BLOOD PRESSURE: 98 MMHG | SYSTOLIC BLOOD PRESSURE: 188 MMHG | BODY MASS INDEX: 33.46 KG/M2

## 2024-01-09 DIAGNOSIS — Z11.4 ENCOUNTER FOR SCREENING FOR HIV: ICD-10-CM

## 2024-01-09 DIAGNOSIS — Z12.5 PROSTATE CANCER SCREENING: ICD-10-CM

## 2024-01-09 DIAGNOSIS — Z11.59 NEED FOR HEPATITIS C SCREENING TEST: ICD-10-CM

## 2024-01-09 DIAGNOSIS — Z12.11 SCREENING FOR COLORECTAL CANCER: ICD-10-CM

## 2024-01-09 DIAGNOSIS — Z95.1 S/P CABG X 2: ICD-10-CM

## 2024-01-09 DIAGNOSIS — I10 ESSENTIAL HYPERTENSION: ICD-10-CM

## 2024-01-09 DIAGNOSIS — E55.9 VITAMIN D INSUFFICIENCY: ICD-10-CM

## 2024-01-09 DIAGNOSIS — Z12.12 SCREENING FOR COLORECTAL CANCER: ICD-10-CM

## 2024-01-09 DIAGNOSIS — Z99.11 DEPENDENCE ON RESPIRATOR (VENTILATOR) STATUS (HCC): ICD-10-CM

## 2024-01-09 DIAGNOSIS — I25.10 ATHEROSCLEROSIS OF NATIVE CORONARY ARTERY OF NATIVE HEART WITHOUT ANGINA PECTORIS: Primary | ICD-10-CM

## 2024-01-09 DIAGNOSIS — E11.42 TYPE 2 DIABETES MELLITUS WITH DIABETIC POLYNEUROPATHY, WITHOUT LONG-TERM CURRENT USE OF INSULIN (HCC): ICD-10-CM

## 2024-01-09 PROBLEM — D72.829 LEUKOCYTOSIS: Status: RESOLVED | Noted: 2021-09-30 | Resolved: 2024-01-09

## 2024-01-09 PROBLEM — D64.9 ANEMIA: Status: RESOLVED | Noted: 2019-09-09 | Resolved: 2024-01-09

## 2024-01-09 LAB — SL AMB POCT HEMOGLOBIN AIC: 7 (ref ?–6.5)

## 2024-01-09 PROCEDURE — 83036 HEMOGLOBIN GLYCOSYLATED A1C: CPT | Performed by: FAMILY MEDICINE

## 2024-01-09 PROCEDURE — 99204 OFFICE O/P NEW MOD 45 MIN: CPT | Performed by: FAMILY MEDICINE

## 2024-01-09 RX ORDER — LOSARTAN POTASSIUM 100 MG/1
100 TABLET ORAL DAILY
Qty: 90 TABLET | Refills: 1 | Status: SHIPPED | OUTPATIENT
Start: 2024-01-09 | End: 2024-07-07

## 2024-01-09 NOTE — ASSESSMENT & PLAN NOTE
Lab Results   Component Value Date    HGBA1C 7.0 (A) 01/09/2024     Patient continues on metformin 1000 mg 2 times daily with meals.  He also takes Lantus 40 units daily at bedtime.  He reports that his blood sugars have been well-controlled when he checks them every 2 days or so.  Would continue with goal hemoglobin A1c less than 7  Currently at 7 improved from 7.3, no need for medication adjustment at today's visit.   Labs are ordered to be completed prior to follow-up including albumin creatinine ratio, CMP, CBC lipid panel

## 2024-01-09 NOTE — ASSESSMENT & PLAN NOTE
Patient continues on losartan 100 mg daily as well as metoprolol 37.5 mg twice daily.    Vitals:    01/09/24 0754   BP: (!) 188/98   Pulse: 60   Temp: 97.9 °F (36.6 °C)   SpO2: 98%      Repeat blood pressure at 168/90.    Given that home blood pressures are typically in the 140s systolic will hold off on medication adjustment at this time.  Recommend bring in home cuff to follow-up visit in 3 months so that we may confirm accuracy of home cuff

## 2024-01-09 NOTE — PROGRESS NOTES
Diabetic Foot Exam    Patient's shoes and socks removed.    Right Foot/Ankle   Right Foot Inspection  Skin Exam: skin normal and skin intact. No dry skin, no warmth, no callus, no erythema, no maceration, no abnormal color, no pre-ulcer, no ulcer and no callus.     Toe Exam: ROM and strength within normal limits and right toe deformity.     Sensory   Vibration: intact  Proprioception: intact  Monofilament testing: intact    Vascular  Capillary refills: < 3 seconds  The right DP pulse is 2+. The right PT pulse is 2+.     Left Foot/Ankle  Left Foot Inspection  Skin Exam: skin normal and skin intact. No dry skin, no warmth, no erythema, no maceration, normal color, no pre-ulcer, no ulcer and no callus.     Toe Exam: ROM and strength within normal limits.     Sensory   Vibration: intact  Proprioception: intact  Monofilament testing: intact    Vascular  Capillary refills: < 3 seconds  The left DP pulse is 2+. The left PT pulse is 2+.     Assign Risk Category  Deformity present  No loss of protective sensation  No weak pulses  Risk: 0

## 2024-01-09 NOTE — PROGRESS NOTES
Family Medicine Follow-Up Office Visit  Uzair Quiroz 63 y.o. male   MRN: [id number] : 1960  ENCOUNTER: 2024 10:31 AM    Assessment and Plan   Atherosclerosis of native coronary artery of native heart without angina pectoris  Patient with history of MI in  follows with Dr. Sifuentes from cardiology.    Refill is provided for 81 mg aspirin daily    Patient will continue routine follow-up with his specialist.    Essential hypertension  Patient continues on losartan 100 mg daily as well as metoprolol 37.5 mg twice daily.    Vitals:    24 0754   BP: (!) 188/98   Pulse: 60   Temp: 97.9 °F (36.6 °C)   SpO2: 98%      Repeat blood pressure at 168/90.    Given that home blood pressures are typically in the 140s systolic will hold off on medication adjustment at this time.  Recommend bring in home cuff to follow-up visit in 3 months so that we may confirm accuracy of home cuff    Type 2 diabetes mellitus with diabetic polyneuropathy, without long-term current use of insulin ([facility])    Lab Results   Component Value Date    HGBA1C 7.0 (A) 2024     Patient continues on metformin 1000 mg 2 times daily with meals.  He also takes Lantus 40 units daily at bedtime.  He reports that his blood sugars have been well-controlled when he checks them every 2 days or so.  Would continue with goal hemoglobin A1c less than 7  Currently at 7 improved from 7.3, no need for medication adjustment at today's visit.   Labs are ordered to be completed prior to follow-up including albumin creatinine ratio, CMP, CBC lipid panel    Vitamin D insufficiency  Patient with a history of vitamin D insufficiency.  Continues to take vitamin D 5000 IU daily.  Will repeat labs for further evaluation and monitoring    Routine screening for HIV, hepatitis C, and PSA are ordered at today's visit.  Patient declines colon cancer screening at this time.    Chief Complaint     Chief Complaint   Patient presents with   • Establish Care        History of Present Illness   Uzair Quiroz is a 63 y.o.-year-old male with past medical history of CAD status post CABG x 2 following his MI in 2021, type 2 diabetes, hypertension, hyperlipidemia who presents today for appointment to establish care.  Patient previously followed with Dr. Mares at [facility] and is transferring care due to change in insurance.  Patient works as a  at [employer].    Patient reports that he does need refills on some of his medications for his chronic conditions which she is requesting today.  He notes that he has been in good health over the past year or so and denies symptoms of chest pain, shortness of breath, nausea, vomiting, diarrhea, constipation, blood in the stool, lightheadedness, dizziness, headaches.  He monitor his blood pressures weekly his blood sugars every other day.  Notes that his systolic blood pressure is usually in the 140s at home.  Notes that his blood sugars are usually under good control.    Review of Systems   Review of Systems   Constitutional:  Negative for fatigue and fever.   HENT:  Negative for congestion and sore throat.    Respiratory:  Negative for cough and shortness of breath.    Cardiovascular:  Negative for chest pain and palpitations.   Gastrointestinal:  Negative for abdominal pain, blood in stool, constipation, diarrhea, nausea and vomiting.   Genitourinary:  Negative for dysuria and hematuria.   Musculoskeletal:  Negative for arthralgias and myalgias.   Skin:  Negative for rash.   Neurological:  Negative for dizziness and headaches.   Psychiatric/Behavioral:  Negative for dysphoric mood. The patient is not nervous/anxious.        Active Problem List     Patient Active Problem List   Diagnosis   • Type 2 diabetes mellitus with diabetic polyneuropathy, without long-term current use of insulin ([facility])   • Essential hypertension   • ACS (acute coronary syndrome) ([facility])   • Hyperlipidemia   • Atherosclerosis of native  "coronary artery of native heart without angina pectoris   • S/P CABG x 2   • Ischemic cardiomyopathy   • Dependence on respirator (ventilator) status ([facility])   • Vitamin D insufficiency       Past Medical History, Past Surgical History, Family History, and Social History were reviewed and updated today as appropriate.    Objective   BP (!) 188/98   Pulse 60   Temp 97.9 °F (36.6 °C)   Ht 5' 7\" (1.702 m)   Wt 96.7 kg (213 lb 3.2 oz)   SpO2 98%   BMI 33.39 kg/m²     Physical Exam  Vitals reviewed.   Constitutional:       General: He is not in acute distress.     Appearance: He is well-developed. He is not ill-appearing.   HENT:      Head: Normocephalic and atraumatic.      Right Ear: Tympanic membrane, ear canal and external ear normal.      Left Ear: Tympanic membrane, ear canal and external ear normal.   Eyes:      General: No scleral icterus.     Conjunctiva/sclera: Conjunctivae normal.   Cardiovascular:      Rate and Rhythm: Normal rate and regular rhythm.      Heart sounds: Normal heart sounds.   Pulmonary:      Effort: Pulmonary effort is normal. No respiratory distress.      Breath sounds: Normal breath sounds. No wheezing.   Abdominal:      General: Bowel sounds are normal. There is no distension.      Palpations: Abdomen is soft.      Tenderness: There is no abdominal tenderness.   Musculoskeletal:      Right lower leg: No edema.      Left lower leg: No edema.   Skin:     General: Skin is warm and dry.   Neurological:      General: No focal deficit present.      Mental Status: He is alert and oriented to person, place, and time.   Psychiatric:         Mood and Affect: Mood normal.         Behavior: Behavior normal.           Pertinent Laboratory/Diagnostic Studies:  Lab Results   Component Value Date    GLUCOSE 139 09/27/2021    BUN 36 (H) 09/29/2021    CREATININE 1.15 09/29/2021    CALCIUM 7.6 (L) 09/29/2021    K 4.4 09/29/2021    CO2 24 09/29/2021     09/29/2021     Lab Results   Component Value " "Date    ALT 27 09/23/2021    AST 20 09/23/2021    ALKPHOS 74 09/23/2021       Lab Results   Component Value Date    WBC 13.39 (H) 09/29/2021    HGB 10.9 (L) 09/29/2021    HCT 33.9 (L) 09/29/2021    MCV 92 09/29/2021     09/29/2021       No results found for: \"TSH\"    No results found for: \"CHOL\"  Lab Results   Component Value Date    TRIG 78 09/23/2021     Lab Results   Component Value Date    HDL 39 (L) 09/23/2021     Lab Results   Component Value Date    LDLCALC 64 09/23/2021     Lab Results   Component Value Date    HGBA1C 7.0 (A) 01/09/2024       Results for orders placed or performed in visit on 01/09/24   POCT hemoglobin A1c   Result Value Ref Range    Hemoglobin A1C 7.0 (A) 6.5       Orders Placed This Encounter   Procedures   • Hepatitis C Antibody   • HIV 1/2 AG/AB w Reflex [facility] for 2 yr old and above   • CBC and differential   • Comprehensive metabolic panel   • Lipid Panel with Direct LDL reflex   • PSA, Total Screen   • Albumin / creatinine urine ratio   • Vitamin D 25 hydroxy   • POCT hemoglobin A1c         Current Medications     Current Outpatient Medications   Medication Sig Dispense Refill   • aspirin (ECOTRIN LOW STRENGTH) 81 mg EC tablet Take 1 tablet (81 mg total) by mouth daily 90 tablet 3   • cholecalciferol (VITAMIN D3) 1,000 units tablet Take 5,000 Units by mouth daily     • LANTUS SOLOSTAR 100 units/mL injection pen 40 Units daily at bedtime     • losartan (COZAAR) 100 MG tablet Take 1 tablet (100 mg total) by mouth daily 90 tablet 1   • metFORMIN (GLUCOPHAGE) 1000 MG tablet Take 1 tablet (1,000 mg total) by mouth 2 (two) times a day with meals 180 tablet 1   • Metoprolol Tartrate 37.5 MG TABS TAKE 1 TABLET BY MOUTH EVERY 12 HOURS 180 tablet 3   • rosuvastatin (CRESTOR) 40 MG tablet TAKE 1 TABLET BY MOUTH EVERY DAY 90 tablet 3     No current facility-administered medications for this visit.       ALLERGIES:  No Known Allergies    Health Maintenance     Health Maintenance   Topic Date " Due   • Hepatitis C Screening  Never done   • Pneumococcal Vaccine: Pediatrics (0 to 5 Years) and At-Risk Patients (6 to 64 Years) (1 - PCV) Never done   • HIV Screening  Never done   • Annual Physical  Never done   • DTaP,Tdap,and Td Vaccines (1 - Tdap) Never done   • Colorectal Cancer Screening  Never done   • Influenza Vaccine (1) Never done   • COVID-19 Vaccine (2 - 2023-24 season) 09/01/2023   • Kidney Health Evaluation: Albumin/Creatinine Ratio  02/28/2024   • Kidney Health Evaluation: GFR  04/01/2024   • DM Eye Exam  04/09/2024 (Originally 8/9/1970)   • HEMOGLOBIN A1C  07/09/2024   • Depression Screening  01/09/2025   • Diabetic Foot Exam  01/09/2025   • HIB Vaccine  Aged Out   • IPV Vaccine  Aged Out   • Hepatitis A Vaccine  Aged Out   • Meningococcal ACWY Vaccine  Aged Out   • HPV Vaccine  Aged Out     Immunization History   Administered Date(s) Administered   • COVID-19 J&J (Rudolph) vaccine 0.5 mL 03/15/2021         Jannie Zamarripa DO   [facility]  1/9/2024  10:31 AM    Parts of this note were dictated using [employer] dictation software and may have sounds-like errors due to variation in pronunciation.

## 2024-01-09 NOTE — ASSESSMENT & PLAN NOTE
Patient with history of MI in 2021 follows with Dr. Sifuentes from cardiology.    Refill is provided for 81 mg aspirin daily    Patient will continue routine follow-up with his specialist.

## 2024-01-09 NOTE — ASSESSMENT & PLAN NOTE
Patient with a history of vitamin D insufficiency.  Continues to take vitamin D 5000 IU daily.  Will repeat labs for further evaluation and monitoring

## 2024-02-28 ENCOUNTER — APPOINTMENT (OUTPATIENT)
Dept: LAB | Facility: MEDICAL CENTER | Age: 64
End: 2024-02-28
Payer: COMMERCIAL

## 2024-02-28 DIAGNOSIS — Z12.5 PROSTATE CANCER SCREENING: ICD-10-CM

## 2024-02-28 DIAGNOSIS — I10 ESSENTIAL HYPERTENSION: ICD-10-CM

## 2024-02-28 DIAGNOSIS — E11.42 TYPE 2 DIABETES MELLITUS WITH DIABETIC POLYNEUROPATHY, WITHOUT LONG-TERM CURRENT USE OF INSULIN (HCC): ICD-10-CM

## 2024-02-28 DIAGNOSIS — E55.9 VITAMIN D INSUFFICIENCY: ICD-10-CM

## 2024-02-28 DIAGNOSIS — Z11.59 NEED FOR HEPATITIS C SCREENING TEST: ICD-10-CM

## 2024-02-28 DIAGNOSIS — Z11.4 ENCOUNTER FOR SCREENING FOR HIV: ICD-10-CM

## 2024-02-28 LAB
25(OH)D3 SERPL-MCNC: 95.4 NG/ML (ref 30–100)
ALBUMIN SERPL BCP-MCNC: 4.4 G/DL (ref 3.5–5)
ALP SERPL-CCNC: 74 U/L (ref 34–104)
ALT SERPL W P-5'-P-CCNC: 21 U/L (ref 7–52)
ANION GAP SERPL CALCULATED.3IONS-SCNC: 8 MMOL/L
AST SERPL W P-5'-P-CCNC: 20 U/L (ref 13–39)
BASOPHILS # BLD AUTO: 0.1 THOUSANDS/ÂΜL (ref 0–0.1)
BASOPHILS NFR BLD AUTO: 2 % (ref 0–1)
BILIRUB SERPL-MCNC: 0.68 MG/DL (ref 0.2–1)
BUN SERPL-MCNC: 19 MG/DL (ref 5–25)
CALCIUM SERPL-MCNC: 9.5 MG/DL (ref 8.4–10.2)
CHLORIDE SERPL-SCNC: 103 MMOL/L (ref 96–108)
CHOLEST SERPL-MCNC: 90 MG/DL
CO2 SERPL-SCNC: 27 MMOL/L (ref 21–32)
CREAT SERPL-MCNC: 0.97 MG/DL (ref 0.6–1.3)
CREAT UR-MCNC: 92.2 MG/DL
EOSINOPHIL # BLD AUTO: 0.29 THOUSAND/ÂΜL (ref 0–0.61)
EOSINOPHIL NFR BLD AUTO: 5 % (ref 0–6)
ERYTHROCYTE [DISTWIDTH] IN BLOOD BY AUTOMATED COUNT: 12.4 % (ref 11.6–15.1)
GFR SERPL CREATININE-BSD FRML MDRD: 83 ML/MIN/1.73SQ M
GLUCOSE P FAST SERPL-MCNC: 138 MG/DL (ref 65–99)
HCT VFR BLD AUTO: 43.1 % (ref 36.5–49.3)
HCV AB SER QL: NORMAL
HDLC SERPL-MCNC: 34 MG/DL
HGB BLD-MCNC: 13.9 G/DL (ref 12–17)
HIV 1+2 AB+HIV1 P24 AG SERPL QL IA: NORMAL
HIV 2 AB SERPL QL IA: NORMAL
HIV1 AB SERPL QL IA: NORMAL
HIV1 P24 AG SERPL QL IA: NORMAL
IMM GRANULOCYTES # BLD AUTO: 0.02 THOUSAND/UL (ref 0–0.2)
IMM GRANULOCYTES NFR BLD AUTO: 0 % (ref 0–2)
LDLC SERPL CALC-MCNC: 30 MG/DL (ref 0–100)
LYMPHOCYTES # BLD AUTO: 2.08 THOUSANDS/ÂΜL (ref 0.6–4.47)
LYMPHOCYTES NFR BLD AUTO: 34 % (ref 14–44)
MCH RBC QN AUTO: 29.1 PG (ref 26.8–34.3)
MCHC RBC AUTO-ENTMCNC: 32.3 G/DL (ref 31.4–37.4)
MCV RBC AUTO: 90 FL (ref 82–98)
MICROALBUMIN UR-MCNC: 54.1 MG/L
MICROALBUMIN/CREAT 24H UR: 59 MG/G CREATININE (ref 0–30)
MONOCYTES # BLD AUTO: 0.74 THOUSAND/ÂΜL (ref 0.17–1.22)
MONOCYTES NFR BLD AUTO: 12 % (ref 4–12)
NEUTROPHILS # BLD AUTO: 2.86 THOUSANDS/ÂΜL (ref 1.85–7.62)
NEUTS SEG NFR BLD AUTO: 47 % (ref 43–75)
NRBC BLD AUTO-RTO: 0 /100 WBCS
PLATELET # BLD AUTO: 280 THOUSANDS/UL (ref 149–390)
PMV BLD AUTO: 9.9 FL (ref 8.9–12.7)
POTASSIUM SERPL-SCNC: 4.8 MMOL/L (ref 3.5–5.3)
PROT SERPL-MCNC: 7 G/DL (ref 6.4–8.4)
PSA SERPL-MCNC: 0.5 NG/ML (ref 0–4)
RBC # BLD AUTO: 4.78 MILLION/UL (ref 3.88–5.62)
SODIUM SERPL-SCNC: 138 MMOL/L (ref 135–147)
TRIGL SERPL-MCNC: 130 MG/DL
WBC # BLD AUTO: 6.09 THOUSAND/UL (ref 4.31–10.16)

## 2024-02-28 PROCEDURE — G0103 PSA SCREENING: HCPCS

## 2024-02-28 PROCEDURE — 82306 VITAMIN D 25 HYDROXY: CPT

## 2024-02-28 PROCEDURE — 87389 HIV-1 AG W/HIV-1&-2 AB AG IA: CPT

## 2024-02-28 PROCEDURE — 86803 HEPATITIS C AB TEST: CPT

## 2024-02-28 PROCEDURE — 36415 COLL VENOUS BLD VENIPUNCTURE: CPT

## 2024-02-28 PROCEDURE — 85025 COMPLETE CBC W/AUTO DIFF WBC: CPT

## 2024-02-28 PROCEDURE — 80061 LIPID PANEL: CPT

## 2024-02-28 PROCEDURE — 80053 COMPREHEN METABOLIC PANEL: CPT

## 2024-04-02 ENCOUNTER — RA CDI HCC (OUTPATIENT)
Dept: OTHER | Facility: HOSPITAL | Age: 64
End: 2024-04-02

## 2024-04-02 PROBLEM — I25.10 ATHEROSCLEROSIS OF NATIVE CORONARY ARTERY OF NATIVE HEART WITHOUT ANGINA PECTORIS: Status: ACTIVE | Noted: 2021-09-22

## 2024-04-02 NOTE — PROGRESS NOTES
HCC coding opportunities          Chart Reviewed number of suggestions sent to Provider: 1   Z79.4    This is a reminder to address (resolve/update/assess) ALL HCC (risk adjustment) codes as found on active problem list for 2024 as patient scores reset to zero BRIANNA.  Also, just a reminder to please review and assess all other chronic conditions for 2024  Patients Insurance        Commercial Insurance: [employer]

## 2024-04-04 PROBLEM — Z99.11 DEPENDENCE ON RESPIRATOR (VENTILATOR) STATUS (HCC): Status: RESOLVED | Noted: 2024-01-09 | Resolved: 2024-04-04

## 2024-04-09 ENCOUNTER — OFFICE VISIT (OUTPATIENT)
Dept: FAMILY MEDICINE CLINIC | Facility: CLINIC | Age: 64
End: 2024-04-09
Payer: COMMERCIAL

## 2024-04-09 VITALS
HEART RATE: 59 BPM | WEIGHT: 217.8 LBS | SYSTOLIC BLOOD PRESSURE: 190 MMHG | TEMPERATURE: 97 F | DIASTOLIC BLOOD PRESSURE: 92 MMHG | BODY MASS INDEX: 34.18 KG/M2 | HEIGHT: 67 IN | OXYGEN SATURATION: 99 %

## 2024-04-09 DIAGNOSIS — I10 ESSENTIAL HYPERTENSION: ICD-10-CM

## 2024-04-09 DIAGNOSIS — E11.42 TYPE 2 DIABETES MELLITUS WITH DIABETIC POLYNEUROPATHY, WITHOUT LONG-TERM CURRENT USE OF INSULIN (HCC): ICD-10-CM

## 2024-04-09 DIAGNOSIS — I42.9 CARDIOMYOPATHY, UNSPECIFIED TYPE (HCC): ICD-10-CM

## 2024-04-09 DIAGNOSIS — E11.42 TYPE 2 DIABETES MELLITUS WITH DIABETIC POLYNEUROPATHY, WITH LONG-TERM CURRENT USE OF INSULIN (HCC): ICD-10-CM

## 2024-04-09 DIAGNOSIS — Z79.4 TYPE 2 DIABETES MELLITUS WITH DIABETIC POLYNEUROPATHY, WITH LONG-TERM CURRENT USE OF INSULIN (HCC): ICD-10-CM

## 2024-04-09 DIAGNOSIS — Z00.00 ANNUAL PHYSICAL EXAM: Primary | ICD-10-CM

## 2024-04-09 LAB — SL AMB POCT HEMOGLOBIN AIC: 7.1 (ref ?–6.5)

## 2024-04-09 PROCEDURE — 83036 HEMOGLOBIN GLYCOSYLATED A1C: CPT | Performed by: FAMILY MEDICINE

## 2024-04-09 PROCEDURE — 99396 PREV VISIT EST AGE 40-64: CPT | Performed by: FAMILY MEDICINE

## 2024-04-09 PROCEDURE — 99214 OFFICE O/P EST MOD 30 MIN: CPT | Performed by: FAMILY MEDICINE

## 2024-04-09 RX ORDER — AMLODIPINE BESYLATE 2.5 MG/1
2.5 TABLET ORAL DAILY
Qty: 90 TABLET | Refills: 1 | Status: SHIPPED | OUTPATIENT
Start: 2024-04-09

## 2024-04-09 NOTE — ASSESSMENT & PLAN NOTE
Lab Results   Component Value Date    HGBA1C 7.1 (A) 04/09/2024     Patient continues on metformin 1000 mg 2 times daily with meals.  He also takes Lantus 40 units daily at bedtime.  He reports that his blood sugars have been well-controlled when he checks them every 2 days or so.  Would continue with goal hemoglobin A1c less than 7  Currently at 7.1, no need for medication adjustment at today's visit.

## 2024-04-09 NOTE — ASSESSMENT & PLAN NOTE
Health maintenance is reviewed.    Patient declines colorectal cancer screening.     Patient declines PCV, Tdap, Flu, COVID, and Shingles vaccinations. He will consider PCV vaccination in the future.

## 2024-04-09 NOTE — PROGRESS NOTES
ADULT ANNUAL PHYSICAL  [facility]    NAME: Uzair Quiroz  AGE: 63 y.o. SEX: male  : 1960     DATE: 2024     Assessment and Plan:     Problem List Items Addressed This Visit        Cardiovascular and Mediastinum    Essential hypertension     Patient continues on losartan 100 mg daily as well as metoprolol 37.5 mg twice daily.    Vitals:    24 0749   BP: (!) 190/92   Pulse: 59   Temp: (!) 97 °F (36.1 °C)   SpO2: 99%       Given that home blood pressures are typically in the 140s-150s systolic will increase plan for medication management at this time. Recommend adding amlodipine 2.5 mg daily. Recommend follow up in 3 months or sooner as needed.          Relevant Medications    amLODIPine (NORVASC) 2.5 mg tablet       Endocrine    Type 2 diabetes mellitus with diabetic polyneuropathy, with long-term current use of insulin (HCC)       Lab Results   Component Value Date    HGBA1C 7.1 (A) 2024     Patient continues on metformin 1000 mg 2 times daily with meals.  He also takes Lantus 40 units daily at bedtime.  He reports that his blood sugars have been well-controlled when he checks them every 2 days or so.  Would continue with goal hemoglobin A1c less than 7  Currently at 7.1, no need for medication adjustment at today's visit.             Other    Annual physical exam - Primary     Health maintenance is reviewed.    Patient declines colorectal cancer screening.     Patient declines PCV, Tdap, Flu, COVID, and Shingles vaccinations. He will consider PCV vaccination in the future.         Other Visit Diagnoses     Type 2 diabetes mellitus with diabetic polyneuropathy, without long-term current use of insulin (HCC)        Relevant Orders    POCT hemoglobin A1c (Completed)    Cardiomyopathy, unspecified type (HCC)        Relevant Medications    amLODIPine (NORVASC) 2.5 mg tablet          Immunizations and preventive care screenings were  discussed with patient today. Appropriate education was printed on patient's after visit summary.    {Prostate cancer screening - consider in males between age of 40-75 depending on age, race, and risk factors. There is difference in the guidelines in regards to the optimal age for screening.  USPSTF states to consider periodic screening in males between the age of 50 to 69. This text is informational and does not need to be selected but if you wish, you can insert standard documentation in your progress note by using F2 (Optional):74488}    Counseling:  {Annual Physical; Counselin}         No follow-ups on file.     Chief Complaint:     Chief Complaint   Patient presents with   • Annual Exam     Well adult exam      History of Present Illness:     Adult Annual Physical   Patient here for a comprehensive physical exam. The patient reports {problems:83726}.    Diet and Physical Activity  Diet/Nutrition: {annual physical; diet:38134164}.   Exercise: {annual physical; exercise:48798420}.      Depression Screening  PHQ-2/9 Depression Screening    Little interest or pleasure in doing things: 0 - not at all  Feeling down, depressed, or hopeless: 0 - not at all  PHQ-2 Score: 0  PHQ-2 Interpretation: Negative depression screen       General Health  Sleep: {annual physical; sleep:74632792}.   Hearing: {annual physical; hearin}.  Vision: {annual physical; vision:32960312}.   Dental: {annual physical; dental:67269562}.        Health  Symptoms include: {annual physical; urinary symptoms:89123}    Advanced Care Planning  Do you have an advanced directive? {YES/NO:}  Do you have a durable medical power of ? {YES/NO:}  ACP document given to patient? {YES/NO:83620}     Review of Systems:     Review of Systems   Past Medical History:     Past Medical History:   Diagnosis Date   • Diabetes mellitus (HCC)    • Hypertension       Past Surgical History:     Past Surgical History:   Procedure  Laterality Date   • CARDIAC CATHETERIZATION  9/23/2021   • INCISION AND DRAINAGE OF WOUND Right 9/8/2019    Procedure: INCISION AND DRAINAGE (I&D) EXTREMITY;  Surgeon: Leroy Coats DPM;  Location: [facility];  Service: Podiatry   • DE CORONARY ARTERY BYP W/VEIN & ARTERY GRAFT 2 VEIN N/A 9/27/2021    Procedure: CORONARY ARTERY BYPASS GRAFT (CABG) 2 VESSELS MALKA to LAD -SVG--> L Posterior lateral, EVH from Left leg;  Surgeon: ARON Simon MD;  Location: [facility];  Service: Cardiac Surgery      Family History:     History reviewed. No pertinent family history.   Social History:     Social History     Socioeconomic History   • Marital status: /Civil Union     Spouse name: None   • Number of children: None   • Years of education: None   • Highest education level: None   Occupational History   • Occupation: cust.     Employer: [employer]   Tobacco Use   • Smoking status: Never   • Smokeless tobacco: Never   Vaping Use   • Vaping status: Never Used   Substance and Sexual Activity   • Alcohol use: Not Currently     Alcohol/week: 30.0 standard drinks of alcohol     Types: 30 Cans of beer per week     Comment: 5-6 beers 4-5 x per week    • Drug use: Never   • Sexual activity: None   Other Topics Concern   • None   Social History Narrative   • None     Social Determinants of Health     Financial Resource Strain: Not on file   Food Insecurity: Not on file   Transportation Needs: Not on file   Physical Activity: Not on file   Stress: Not on file   Social Connections: Not on file   Intimate Partner Violence: Not on file   Housing Stability: Not on file      Current Medications:     Current Outpatient Medications   Medication Sig Dispense Refill   • amLODIPine (NORVASC) 2.5 mg tablet Take 1 tablet (2.5 mg total) by mouth daily 90 tablet 1   • aspirin (ECOTRIN LOW STRENGTH) 81 mg EC tablet Take 1 tablet (81 mg total) by mouth daily 90 tablet 3   • cholecalciferol (VITAMIN D3) 1,000 units tablet  "Take 4,000 Units by mouth daily     • LANTUS SOLOSTAR 100 units/mL injection pen 40 Units daily at bedtime     • losartan (COZAAR) 100 MG tablet Take 1 tablet (100 mg total) by mouth daily 90 tablet 1   • metFORMIN (GLUCOPHAGE) 1000 MG tablet Take 1 tablet (1,000 mg total) by mouth 2 (two) times a day with meals 180 tablet 1   • Metoprolol Tartrate 37.5 MG TABS TAKE 1 TABLET BY MOUTH EVERY 12 HOURS 180 tablet 3   • rosuvastatin (CRESTOR) 40 MG tablet TAKE 1 TABLET BY MOUTH EVERY DAY 90 tablet 3     No current facility-administered medications for this visit.      Allergies:     No Known Allergies   Physical Exam:     BP (!) 190/92   Pulse 59   Temp (!) 97 °F (36.1 °C)   Ht 5' 7\" (1.702 m)   Wt 98.8 kg (217 lb 12.8 oz)   SpO2 99%   BMI 34.11 kg/m²     Physical Exam  Vitals reviewed.   Constitutional:       General: He is not in acute distress.     Appearance: Normal appearance.   HENT:      Head: Normocephalic and atraumatic.      Right Ear: Tympanic membrane, ear canal and external ear normal.      Left Ear: Tympanic membrane, ear canal and external ear normal.      Mouth/Throat:      Mouth: Mucous membranes are moist.      Pharynx: Oropharynx is clear.   Eyes:      General: No scleral icterus.        Right eye: No discharge.         Left eye: No discharge.      Conjunctiva/sclera: Conjunctivae normal.      Pupils: Pupils are equal, round, and reactive to light.   Cardiovascular:      Rate and Rhythm: Normal rate and regular rhythm.      Heart sounds: No murmur heard.     No friction rub. No gallop.   Pulmonary:      Effort: Pulmonary effort is normal.      Breath sounds: No wheezing, rhonchi or rales.   Abdominal:      General: Bowel sounds are normal.      Palpations: Abdomen is soft.      Tenderness: There is no abdominal tenderness. There is no guarding or rebound.   Musculoskeletal:      Right lower leg: No edema.      Left lower leg: No edema.   Skin:     General: Skin is warm and dry.   Neurological:    "   General: No focal deficit present.      Mental Status: He is alert and oriented to person, place, and time.   Psychiatric:         Mood and Affect: Mood normal.         Behavior: Behavior normal.          Jannie Zamarripa DO  [facility]

## 2024-04-09 NOTE — PROGRESS NOTES
ADULT ANNUAL PHYSICAL  [facility]    NAME: Uzair Quiroz  AGE: 63 y.o. SEX: male  : 1960     DATE: 2024     Assessment and Plan:     Problem List Items Addressed This Visit        Cardiovascular and Mediastinum    Essential hypertension     Patient continues on losartan 100 mg daily as well as metoprolol 37.5 mg twice daily.    Vitals:    24 0749   BP: (!) 190/92   Pulse: 59   Temp: (!) 97 °F (36.1 °C)   SpO2: 99%       Given that home blood pressures are typically in the 140s-150s systolic will increase plan for medication management at this time. Recommend adding amlodipine 2.5 mg daily. Recommend follow up in 3 months or sooner as needed.          Relevant Medications    amLODIPine (NORVASC) 2.5 mg tablet       Endocrine    Type 2 diabetes mellitus with diabetic polyneuropathy, with long-term current use of insulin (HCC)       Lab Results   Component Value Date    HGBA1C 7.1 (A) 2024     Patient continues on metformin 1000 mg 2 times daily with meals.  He also takes Lantus 40 units daily at bedtime.  He reports that his blood sugars have been well-controlled when he checks them every 2 days or so.  Would continue with goal hemoglobin A1c less than 7  Currently at 7.1, no need for medication adjustment at today's visit.             Other    Annual physical exam - Primary     Health maintenance is reviewed.    Patient declines colorectal cancer screening.     Patient declines PCV, Tdap, Flu, COVID, and Shingles vaccinations. He will consider PCV vaccination in the future.         Other Visit Diagnoses     Type 2 diabetes mellitus with diabetic polyneuropathy, without long-term current use of insulin (HCC)        Relevant Orders    POCT hemoglobin A1c (Completed)    Cardiomyopathy, unspecified type (HCC)        Relevant Medications    amLODIPine (NORVASC) 2.5 mg tablet          Immunizations and preventive care screenings were  discussed with patient today. Appropriate education was printed on patient's after visit summary.    Discussed risks and benefits of prostate cancer screening. We discussed the controversial history of PSA screening for prostate cancer in the [place] as well as the risk of over detection and over treatment of prostate cancer by way of PSA screening.  The patient understands that PSA blood testing is an imperfect way to screen for prostate cancer and that elevated PSA levels in the blood may also be caused by infection, inflammation, prostatic trauma or manipulation, urological procedures, or by benign prostatic enlargement.    The role of the digital rectal examination in prostate cancer screening was also discussed and I discussed with him that there is large interobserver variability in the findings of digital rectal examination.    Counseling:  Alcohol/drug use: discussed moderation in alcohol intake, the recommendations for healthy alcohol use, and avoidance of illicit drug use.  Dental Health: discussed importance of regular tooth brushing, flossing, and dental visits.  Injury prevention: discussed safety/seat belts, safety helmets, smoke detectors, carbon dioxide detectors, and smoking near bedding or upholstery.  Sexual health: discussed sexually transmitted diseases, partner selection, use of condoms, avoidance of unintended pregnancy, and contraceptive alternatives.  Exercise: the importance of regular exercise/physical activity was discussed. Recommend exercise 3-5 times per week for at least 30 minutes.          No follow-ups on file.     Chief Complaint:     Chief Complaint   Patient presents with   • Annual Exam     Well adult exam      History of Present Illness:     Adult Annual Physical   Patient here for a comprehensive physical exam. The patient reports no problems. Notes that his home blood pressures are in the 140s to 150s systolic. Note that he just took his blood pressure medication  before arrival at the office.     Diet and Physical Activity  Diet/Nutrition: well balanced diet.   Exercise: no formal exercise. Active lifestyle.     Depression Screening  PHQ-2/9 Depression Screening    Little interest or pleasure in doing things: 0 - not at all  Feeling down, depressed, or hopeless: 0 - not at all  PHQ-2 Score: 0  PHQ-2 Interpretation: Negative depression screen       General Health  Sleep: gets 7-8 hours of sleep on average.   Hearing: decreased - bilateral.   Vision: most recent eye exam <1 year ago and wears glasses.   Dental: regular dental visits.        Health  Symptoms include: none    Advanced Care Planning  Do you have an advanced directive? no  Do you have a durable medical power of ? no  ACP document given to patient? yes     Review of Systems:     Review of Systems   Constitutional:  Negative for fatigue and fever.   HENT:  Negative for congestion and sore throat.    Respiratory:  Negative for cough and shortness of breath.    Cardiovascular:  Negative for chest pain and palpitations.   Gastrointestinal:  Negative for abdominal pain, blood in stool, constipation, diarrhea, nausea and vomiting.   Genitourinary:  Negative for dysuria and hematuria.   Musculoskeletal:  Negative for arthralgias and myalgias.   Skin:  Negative for rash.   Neurological:  Negative for dizziness and headaches.   Psychiatric/Behavioral:  Negative for dysphoric mood. The patient is not nervous/anxious.       Past Medical History:     Past Medical History:   Diagnosis Date   • Diabetes mellitus (HCC)    • Hypertension       Past Surgical History:     Past Surgical History:   Procedure Laterality Date   • CARDIAC CATHETERIZATION  9/23/2021   • INCISION AND DRAINAGE OF WOUND Right 9/8/2019    Procedure: INCISION AND DRAINAGE (I&D) EXTREMITY;  Surgeon: Leroy Coats DPM;  Location: [facility];  Service: Podiatry   • MI CORONARY ARTERY BYP W/VEIN & ARTERY GRAFT 2 VEIN N/A 9/27/2021    Procedure:  CORONARY ARTERY BYPASS GRAFT (CABG) 2 VESSELS MALKA to LAD -SVG--> L Posterior lateral, EVH from Left leg;  Surgeon: ARON Simon MD;  Location: [facility];  Service: Cardiac Surgery      Family History:     History reviewed. No pertinent family history.   Social History:     Social History     Socioeconomic History   • Marital status: /Civil Union     Spouse name: None   • Number of children: None   • Years of education: None   • Highest education level: None   Occupational History   • Occupation: cust.     Employer: [employer]   Tobacco Use   • Smoking status: Never   • Smokeless tobacco: Never   Vaping Use   • Vaping status: Never Used   Substance and Sexual Activity   • Alcohol use: Not Currently     Alcohol/week: 30.0 standard drinks of alcohol     Types: 30 Cans of beer per week     Comment: 5-6 beers 4-5 x per week    • Drug use: Never   • Sexual activity: None   Other Topics Concern   • None   Social History Narrative   • None     Social Determinants of Health     Financial Resource Strain: Not on file   Food Insecurity: Not on file   Transportation Needs: Not on file   Physical Activity: Not on file   Stress: Not on file   Social Connections: Not on file   Intimate Partner Violence: Not on file   Housing Stability: Not on file      Current Medications:     Current Outpatient Medications   Medication Sig Dispense Refill   • amLODIPine (NORVASC) 2.5 mg tablet Take 1 tablet (2.5 mg total) by mouth daily 90 tablet 1   • aspirin (ECOTRIN LOW STRENGTH) 81 mg EC tablet Take 1 tablet (81 mg total) by mouth daily 90 tablet 3   • cholecalciferol (VITAMIN D3) 1,000 units tablet Take 4,000 Units by mouth daily     • LANTUS SOLOSTAR 100 units/mL injection pen 40 Units daily at bedtime     • losartan (COZAAR) 100 MG tablet Take 1 tablet (100 mg total) by mouth daily 90 tablet 1   • metFORMIN (GLUCOPHAGE) 1000 MG tablet Take 1 tablet (1,000 mg total) by mouth 2 (two) times a day with meals 180  "tablet 1   • Metoprolol Tartrate 37.5 MG TABS TAKE 1 TABLET BY MOUTH EVERY 12 HOURS 180 tablet 3   • rosuvastatin (CRESTOR) 40 MG tablet TAKE 1 TABLET BY MOUTH EVERY DAY 90 tablet 3     No current facility-administered medications for this visit.      Allergies:     No Known Allergies   Physical Exam:     BP (!) 190/92   Pulse 59   Temp (!) 97 °F (36.1 °C)   Ht 5' 7\" (1.702 m)   Wt 98.8 kg (217 lb 12.8 oz)   SpO2 99%   BMI 34.11 kg/m²     Physical Exam  Vitals reviewed.   Constitutional:       General: He is not in acute distress.     Appearance: Normal appearance.   HENT:      Head: Normocephalic and atraumatic.      Right Ear: Tympanic membrane, ear canal and external ear normal.      Left Ear: Tympanic membrane, ear canal and external ear normal.      Mouth/Throat:      Mouth: Mucous membranes are moist.      Pharynx: Oropharynx is clear.   Eyes:      General: No scleral icterus.        Right eye: No discharge.         Left eye: No discharge.      Conjunctiva/sclera: Conjunctivae normal.      Pupils: Pupils are equal, round, and reactive to light.   Cardiovascular:      Rate and Rhythm: Normal rate and regular rhythm.      Heart sounds: No murmur heard.     No friction rub. No gallop.   Pulmonary:      Effort: Pulmonary effort is normal.      Breath sounds: No wheezing, rhonchi or rales.   Abdominal:      General: Bowel sounds are normal.      Palpations: Abdomen is soft.      Tenderness: There is no abdominal tenderness. There is no guarding or rebound.   Musculoskeletal:      Right lower leg: No edema.      Left lower leg: No edema.   Skin:     General: Skin is warm and dry.   Neurological:      General: No focal deficit present.      Mental Status: He is alert and oriented to person, place, and time.   Psychiatric:         Mood and Affect: Mood normal.         Behavior: Behavior normal.          Jannie Zamarripa,   [facility]    "

## 2024-04-09 NOTE — ASSESSMENT & PLAN NOTE
Patient continues on losartan 100 mg daily as well as metoprolol 37.5 mg twice daily.    Vitals:    04/09/24 0749   BP: (!) 190/92   Pulse: 59   Temp: (!) 97 °F (36.1 °C)   SpO2: 99%       Given that home blood pressures are typically in the 140s-150s systolic will increase plan for medication management at this time. Recommend adding amlodipine 2.5 mg daily. Recommend follow up in 3 months or sooner as needed.

## 2024-05-14 ENCOUNTER — TELEPHONE (OUTPATIENT)
Age: 64
End: 2024-05-14

## 2024-05-14 NOTE — TELEPHONE ENCOUNTER
Pt spouse called requesting information on why pt was billed for a $50 copay for last visit on 4/9/24. Pt was informed that there would not be a charge for a visit because it was a preventative. According to chart pt visit was billed as preventative and OVS. Please review and advise pt spouse.

## 2024-06-19 DIAGNOSIS — I10 ESSENTIAL HYPERTENSION: ICD-10-CM

## 2024-06-19 RX ORDER — LOSARTAN POTASSIUM 100 MG/1
100 TABLET ORAL DAILY
Qty: 90 TABLET | Refills: 1 | Status: SHIPPED | OUTPATIENT
Start: 2024-06-19

## 2024-06-24 DIAGNOSIS — I10 ESSENTIAL HYPERTENSION: ICD-10-CM

## 2024-06-24 DIAGNOSIS — E11.42 TYPE 2 DIABETES MELLITUS WITH DIABETIC POLYNEUROPATHY, WITHOUT LONG-TERM CURRENT USE OF INSULIN (HCC): ICD-10-CM

## 2024-06-25 RX ORDER — AMLODIPINE BESYLATE 2.5 MG/1
2.5 TABLET ORAL DAILY
Qty: 90 TABLET | Refills: 1 | Status: SHIPPED | OUTPATIENT
Start: 2024-06-25

## 2024-08-27 ENCOUNTER — OFFICE VISIT (OUTPATIENT)
Dept: FAMILY MEDICINE CLINIC | Facility: CLINIC | Age: 64
End: 2024-08-27
Payer: COMMERCIAL

## 2024-08-27 VITALS
TEMPERATURE: 97.5 F | HEART RATE: 73 BPM | SYSTOLIC BLOOD PRESSURE: 174 MMHG | DIASTOLIC BLOOD PRESSURE: 84 MMHG | BODY MASS INDEX: 34.59 KG/M2 | HEIGHT: 67 IN | OXYGEN SATURATION: 97 % | WEIGHT: 220.4 LBS

## 2024-08-27 DIAGNOSIS — E11.42 TYPE 2 DIABETES MELLITUS WITH DIABETIC POLYNEUROPATHY, WITH LONG-TERM CURRENT USE OF INSULIN (HCC): Primary | ICD-10-CM

## 2024-08-27 DIAGNOSIS — Z79.4 TYPE 2 DIABETES MELLITUS WITH DIABETIC POLYNEUROPATHY, WITH LONG-TERM CURRENT USE OF INSULIN (HCC): Primary | ICD-10-CM

## 2024-08-27 DIAGNOSIS — I10 ESSENTIAL HYPERTENSION: ICD-10-CM

## 2024-08-27 LAB — SL AMB POCT HEMOGLOBIN AIC: 7.4 (ref ?–6.5)

## 2024-08-27 PROCEDURE — 83036 HEMOGLOBIN GLYCOSYLATED A1C: CPT | Performed by: FAMILY MEDICINE

## 2024-08-27 PROCEDURE — 99214 OFFICE O/P EST MOD 30 MIN: CPT | Performed by: FAMILY MEDICINE

## 2024-08-27 NOTE — PROGRESS NOTES
Ambulatory Visit  Name: Uzair Quiroz      : 1960      MRN: [id number]  Encounter Provider: Jannie Zamarripa DO  Encounter Date: 2024   Encounter department: [facility]    Assessment & Plan   1. Type 2 diabetes mellitus with diabetic polyneuropathy, with long-term current use of insulin (HCC)  Assessment & Plan:    Lab Results   Component Value Date    HGBA1C 7.4 (A) 2024     Patient continues on metformin 1000 mg 2 times daily with meals.  He also takes Lantus 40 units daily at bedtime.  He reports that his blood sugars with intermittent elevations.  Would continue with goal hemoglobin A1c less than 7  Given that hemoglobin A1c has been persistently above 7 recommendation for increasing basal insulin to 44 units of Lantus nightly.  Recommend follow-up in 3 months or sooner as needed.  Orders:  -     POCT hemoglobin A1c  2. Essential hypertension  Assessment & Plan:  Patient continues on losartan 100 mg daily, metoprolol 37.5 mg twice daily, and amlodipine 2.5 mg daily    Vitals:    24 0752   BP: (!) 174/84   Pulse: 73   Temp: 97.5 °F (36.4 °C)   SpO2: 97%     Recommend that patient follow-up for nursing visit to assess the accuracy of his home blood pressure cuff.  Should his home cuff be inaccurate would recommend increasing the amlodipine dosage if needed.  That being said patient notes an element of whitecoat hypertension so will defer changes at this time.  Recommend follow-up in 3 months or sooner as needed.       History of Present Illness     Patient is a 64-year-old male with past medical history of CAD status post CABG x 2, type 2 diabetes, hypertension and hyperlipidemia who presents today for follow-up regarding chronic conditions.    Patient notes improved home blood pressures with his medication changes and notes his blood pressures now typically range in the 130s to 140s systolic.  Denies symptoms of chest pain, shortness of breath, lightheadedness or  dizziness.    Notes that his home blood sugars are usually 100-110 though sometimes range as high as the 130s.        Review of Systems   Constitutional:  Negative for fatigue and fever.   HENT:  Negative for congestion and sore throat.    Respiratory:  Negative for cough and shortness of breath.    Cardiovascular:  Negative for chest pain and palpitations.   Gastrointestinal:  Negative for abdominal pain, blood in stool, constipation, diarrhea, nausea and vomiting.   Genitourinary:  Negative for dysuria and hematuria.   Musculoskeletal:  Negative for arthralgias and myalgias.   Skin:  Negative for rash.   Neurological:  Negative for dizziness and headaches.   Psychiatric/Behavioral:  Negative for dysphoric mood. The patient is not nervous/anxious.      Medical History Reviewed by provider this encounter:       Past Medical History   Past Medical History:   Diagnosis Date   • Diabetes mellitus (HCC)    • Hypertension      Past Surgical History:   Procedure Laterality Date   • CARDIAC CATHETERIZATION  9/23/2021   • INCISION AND DRAINAGE OF WOUND Right 9/8/2019    Procedure: INCISION AND DRAINAGE (I&D) EXTREMITY;  Surgeon: Leroy Coats DPM;  Location: [facility];  Service: Podiatry   • OK CORONARY ARTERY BYP W/VEIN & ARTERY GRAFT 2 VEIN N/A 9/27/2021    Procedure: CORONARY ARTERY BYPASS GRAFT (CABG) 2 VESSELS MALKA to LAD -SVG--> L Posterior lateral, EVH from Left leg;  Surgeon: ARON Simon MD;  Location:  MAIN OR;  Service: Cardiac Surgery     History reviewed. No pertinent family history.  Current Outpatient Medications on File Prior to Visit   Medication Sig Dispense Refill   • amLODIPine (NORVASC) 2.5 mg tablet Take 1 tablet (2.5 mg total) by mouth daily 90 tablet 1   • aspirin (ECOTRIN LOW STRENGTH) 81 mg EC tablet Take 1 tablet (81 mg total) by mouth daily 90 tablet 3   • cholecalciferol (VITAMIN D3) 1,000 units tablet Take 4,000 Units by mouth daily     • LANTUS SOLOSTAR 100 units/mL injection  "pen 44 Units daily at bedtime     • losartan (COZAAR) 100 MG tablet TAKE 1 TABLET BY MOUTH EVERY DAY 90 tablet 1   • metFORMIN (GLUCOPHAGE) 1000 MG tablet TAKE 1 TABLET BY MOUTH TWO TIMES DAILY WITH MEALS 180 tablet 1   • Metoprolol Tartrate 37.5 MG TABS TAKE 1 TABLET BY MOUTH EVERY 12 HOURS 180 tablet 3   • rosuvastatin (CRESTOR) 40 MG tablet TAKE 1 TABLET BY MOUTH EVERY DAY 90 tablet 3     No current facility-administered medications on file prior to visit.   No Known Allergies   Current Outpatient Medications on File Prior to Visit   Medication Sig Dispense Refill   • amLODIPine (NORVASC) 2.5 mg tablet Take 1 tablet (2.5 mg total) by mouth daily 90 tablet 1   • aspirin (ECOTRIN LOW STRENGTH) 81 mg EC tablet Take 1 tablet (81 mg total) by mouth daily 90 tablet 3   • cholecalciferol (VITAMIN D3) 1,000 units tablet Take 4,000 Units by mouth daily     • LANTUS SOLOSTAR 100 units/mL injection pen 44 Units daily at bedtime     • losartan (COZAAR) 100 MG tablet TAKE 1 TABLET BY MOUTH EVERY DAY 90 tablet 1   • metFORMIN (GLUCOPHAGE) 1000 MG tablet TAKE 1 TABLET BY MOUTH TWO TIMES DAILY WITH MEALS 180 tablet 1   • Metoprolol Tartrate 37.5 MG TABS TAKE 1 TABLET BY MOUTH EVERY 12 HOURS 180 tablet 3   • rosuvastatin (CRESTOR) 40 MG tablet TAKE 1 TABLET BY MOUTH EVERY DAY 90 tablet 3     No current facility-administered medications on file prior to visit.      Social History     Tobacco Use   • Smoking status: Never   • Smokeless tobacco: Never   Vaping Use   • Vaping status: Never Used   Substance and Sexual Activity   • Alcohol use: Not Currently     Alcohol/week: 30.0 standard drinks of alcohol     Types: 30 Cans of beer per week     Comment: 5-6 beers 4-5 x per week    • Drug use: Never   • Sexual activity: Not on file     Objective     BP (!) 174/84   Pulse 73   Temp 97.5 °F (36.4 °C)   Ht 5' 7\" (1.702 m)   Wt 100 kg (220 lb 6.4 oz)   SpO2 97%   BMI 34.52 kg/m²     Physical Exam  Vitals reviewed.   Constitutional:  "      General: He is not in acute distress.     Appearance: Normal appearance.   HENT:      Head: Normocephalic and atraumatic.      Right Ear: External ear normal.      Left Ear: External ear normal.      Mouth/Throat:      Mouth: Mucous membranes are moist.      Pharynx: Oropharynx is clear.   Eyes:      General: No scleral icterus.        Right eye: No discharge.         Left eye: No discharge.      Conjunctiva/sclera: Conjunctivae normal.   Cardiovascular:      Rate and Rhythm: Normal rate and regular rhythm.      Heart sounds: No murmur heard.     No friction rub. No gallop.   Pulmonary:      Effort: Pulmonary effort is normal.      Breath sounds: No wheezing, rhonchi or rales.   Abdominal:      General: Bowel sounds are normal.      Palpations: Abdomen is soft.      Tenderness: There is no abdominal tenderness. There is no guarding or rebound.   Musculoskeletal:      Right lower leg: No edema.      Left lower leg: No edema.   Skin:     General: Skin is warm and dry.   Neurological:      General: No focal deficit present.      Mental Status: He is alert and oriented to person, place, and time.   Psychiatric:         Mood and Affect: Mood normal.         Behavior: Behavior normal.       Administrative Statements

## 2024-08-28 NOTE — ASSESSMENT & PLAN NOTE
Lab Results   Component Value Date    HGBA1C 7.4 (A) 08/27/2024     Patient continues on metformin 1000 mg 2 times daily with meals.  He also takes Lantus 40 units daily at bedtime.  He reports that his blood sugars with intermittent elevations.  Would continue with goal hemoglobin A1c less than 7  Given that hemoglobin A1c has been persistently above 7 recommendation for increasing basal insulin to 44 units of Lantus nightly.  Recommend follow-up in 3 months or sooner as needed.

## 2024-08-28 NOTE — ASSESSMENT & PLAN NOTE
Patient continues on losartan 100 mg daily, metoprolol 37.5 mg twice daily, and amlodipine 2.5 mg daily    Vitals:    08/27/24 0752   BP: (!) 174/84   Pulse: 73   Temp: 97.5 °F (36.4 °C)   SpO2: 97%     Recommend that patient follow-up for nursing visit to assess the accuracy of his home blood pressure cuff.  Should his home cuff be inaccurate would recommend increasing the amlodipine dosage if needed.  That being said patient notes an element of whitecoat hypertension so will defer changes at this time.  Recommend follow-up in 3 months or sooner as needed.

## 2024-10-03 ENCOUNTER — VBI (OUTPATIENT)
Dept: ADMINISTRATIVE | Facility: OTHER | Age: 64
End: 2024-10-03

## 2024-10-03 NOTE — TELEPHONE ENCOUNTER
10/03/24 9:43 AM     Chart reviewed for Diabetic Eye Exam ; nothing is submitted to the patient's insurance at this time.     Hannah Coates MA   [facility]

## 2024-10-15 DIAGNOSIS — E11.42 TYPE 2 DIABETES MELLITUS WITH DIABETIC POLYNEUROPATHY, WITH LONG-TERM CURRENT USE OF INSULIN (HCC): Primary | ICD-10-CM

## 2024-10-15 DIAGNOSIS — Z79.4 TYPE 2 DIABETES MELLITUS WITH DIABETIC POLYNEUROPATHY, WITH LONG-TERM CURRENT USE OF INSULIN (HCC): Primary | ICD-10-CM

## 2024-10-15 RX ORDER — INSULIN GLARGINE 100 [IU]/ML
44 INJECTION, SOLUTION SUBCUTANEOUS
Qty: 15 ML | Refills: 0 | Status: SHIPPED | OUTPATIENT
Start: 2024-10-15

## 2024-10-15 NOTE — TELEPHONE ENCOUNTER
States that patient no longer sees Cardio and pcp needs to fill lantus.    Reason for call:   [x] Refill   [] Prior Auth  [] Other: prescribed by another doctor    Office:   [x] PCP/Provider -Jannie Zamarripa/[place]   [] Specialty/Provider -     Medication: Lantus Solostar 100 units/ml    Dose/Frequency: 44 units at bedtime    Quantity:     Pharmacy: [address]    Does the patient have enough for 3 days?   [] Yes   [x] No - Send as HP to POD

## 2024-10-15 NOTE — TELEPHONE ENCOUNTER
Lantus SoloStar 100 units/mL SOPN          Si.44 mL (44 Units total) daily at bedtime    Disp: Not specified    Refills:    DARIO    Start: 10/15/2024    Class: Normal    Last ordered: 4 years ago (2020) by Historical Provider, MD    Endocrinology:  Diabetes - Insulins [facility]nc[facility]75/15/2024 09:08 AM   Protocol Details HBA1C within 180 days    Valid encounter within last 6 months   [employer]ded Osunojn02/15/2024 09:08 AM   The requested medication is not on the active medication list.      To be filled at: [id number] - [place] - [address]

## 2024-11-17 DIAGNOSIS — Z79.4 TYPE 2 DIABETES MELLITUS WITH DIABETIC POLYNEUROPATHY, WITH LONG-TERM CURRENT USE OF INSULIN (HCC): ICD-10-CM

## 2024-11-17 DIAGNOSIS — E11.42 TYPE 2 DIABETES MELLITUS WITH DIABETIC POLYNEUROPATHY, WITH LONG-TERM CURRENT USE OF INSULIN (HCC): ICD-10-CM

## 2024-11-18 DIAGNOSIS — I24.9 ACS (ACUTE CORONARY SYNDROME) (HCC): ICD-10-CM

## 2024-11-18 DIAGNOSIS — Z95.1 S/P CABG X 2: ICD-10-CM

## 2024-11-18 DIAGNOSIS — E11.42 TYPE 2 DIABETES MELLITUS WITH DIABETIC POLYNEUROPATHY, WITHOUT LONG-TERM CURRENT USE OF INSULIN (HCC): ICD-10-CM

## 2024-11-18 RX ORDER — INSULIN GLARGINE 100 [IU]/ML
INJECTION, SOLUTION SUBCUTANEOUS
Qty: 15 ML | Refills: 1 | Status: SHIPPED | OUTPATIENT
Start: 2024-11-18

## 2024-11-19 RX ORDER — METOPROLOL TARTRATE 37.5 MG/1
1 TABLET ORAL EVERY 12 HOURS
Qty: 180 TABLET | Refills: 1 | Status: SHIPPED | OUTPATIENT
Start: 2024-11-19

## 2024-11-19 RX ORDER — ROSUVASTATIN CALCIUM 40 MG/1
40 TABLET, COATED ORAL DAILY
Qty: 90 TABLET | Refills: 1 | Status: SHIPPED | OUTPATIENT
Start: 2024-11-19

## 2024-11-30 DIAGNOSIS — I10 ESSENTIAL HYPERTENSION: ICD-10-CM

## 2024-12-01 RX ORDER — LOSARTAN POTASSIUM 100 MG/1
100 TABLET ORAL DAILY
Qty: 90 TABLET | Refills: 1 | Status: SHIPPED | OUTPATIENT
Start: 2024-12-01

## 2024-12-01 RX ORDER — AMLODIPINE BESYLATE 2.5 MG/1
2.5 TABLET ORAL DAILY
Qty: 90 TABLET | Refills: 1 | Status: SHIPPED | OUTPATIENT
Start: 2024-12-01

## 2024-12-30 DIAGNOSIS — I25.10 ATHEROSCLEROSIS OF NATIVE CORONARY ARTERY OF NATIVE HEART WITHOUT ANGINA PECTORIS: ICD-10-CM

## 2024-12-31 RX ORDER — ASPIRIN 81 MG/1
81 TABLET, COATED ORAL DAILY
Qty: 90 TABLET | Refills: 1 | Status: SHIPPED | OUTPATIENT
Start: 2024-12-31

## 2025-01-05 DIAGNOSIS — E11.42 TYPE 2 DIABETES MELLITUS WITH DIABETIC POLYNEUROPATHY, WITHOUT LONG-TERM CURRENT USE OF INSULIN (HCC): ICD-10-CM

## 2025-01-06 ENCOUNTER — OFFICE VISIT (OUTPATIENT)
Dept: FAMILY MEDICINE CLINIC | Facility: CLINIC | Age: 65
End: 2025-01-06
Payer: COMMERCIAL

## 2025-01-06 VITALS
SYSTOLIC BLOOD PRESSURE: 164 MMHG | BODY MASS INDEX: 35.31 KG/M2 | OXYGEN SATURATION: 94 % | HEIGHT: 67 IN | TEMPERATURE: 96.4 F | DIASTOLIC BLOOD PRESSURE: 100 MMHG | WEIGHT: 225 LBS | HEART RATE: 64 BPM

## 2025-01-06 DIAGNOSIS — E55.9 VITAMIN D INSUFFICIENCY: ICD-10-CM

## 2025-01-06 DIAGNOSIS — I25.10 ATHEROSCLEROSIS OF NATIVE CORONARY ARTERY OF NATIVE HEART WITHOUT ANGINA PECTORIS: ICD-10-CM

## 2025-01-06 DIAGNOSIS — E66.9 OBESITY (BMI 30-39.9): ICD-10-CM

## 2025-01-06 DIAGNOSIS — Z79.4 TYPE 2 DIABETES MELLITUS WITH DIABETIC POLYNEUROPATHY, WITH LONG-TERM CURRENT USE OF INSULIN (HCC): ICD-10-CM

## 2025-01-06 DIAGNOSIS — E11.42 TYPE 2 DIABETES MELLITUS WITH DIABETIC POLYNEUROPATHY, WITH LONG-TERM CURRENT USE OF INSULIN (HCC): ICD-10-CM

## 2025-01-06 DIAGNOSIS — Z12.5 PROSTATE CANCER SCREENING: ICD-10-CM

## 2025-01-06 DIAGNOSIS — I10 ESSENTIAL HYPERTENSION: Primary | ICD-10-CM

## 2025-01-06 LAB — SL AMB POCT HEMOGLOBIN AIC: 7.8 (ref ?–6.5)

## 2025-01-06 PROCEDURE — 99214 OFFICE O/P EST MOD 30 MIN: CPT | Performed by: FAMILY MEDICINE

## 2025-01-06 PROCEDURE — 83036 HEMOGLOBIN GLYCOSYLATED A1C: CPT | Performed by: FAMILY MEDICINE

## 2025-01-06 RX ORDER — AMLODIPINE BESYLATE 5 MG/1
5 TABLET ORAL DAILY
Qty: 90 TABLET | Refills: 3 | Status: SHIPPED | OUTPATIENT
Start: 2025-01-06

## 2025-01-06 NOTE — PROGRESS NOTES
Name: Uzair Quiroz      : 1960      MRN: [id number]  Encounter Provider: Jannie Zamarripa DO  Encounter Date: 2025   Encounter department: [facility]  :  Assessment & Plan  Essential hypertension    In office blood pressures remain uncontrolled.  Home blood pressures are borderline.  Recommend increasing amlodipine to 5 mg daily from 2.5 mg daily.  Recommend follow-up in 3 months or sooner as needed.    Orders:  •  amLODIPine (NORVASC) 5 mg tablet; Take 1 tablet (5 mg total) by mouth daily  •  CBC and differential; Future  •  Comprehensive metabolic panel; Future  •  Vitamin D 25 hydroxy; Future  •  Lipid Panel with Direct LDL reflex; Future    Type 2 diabetes mellitus with diabetic polyneuropathy, with long-term current use of insulin (HCC)    Patient continues with upward trend in hemoglobin A1c.  Goal hemoglobin A1c less than 7.  Patient prefers to trial lifestyle changes for management of this including decreasing alcohol consumption.  Plan to recheck labs in 3 months to reassess this.    Lab Results   Component Value Date    HGBA1C 7.8 (A) 2025     Orders:  •  POCT hemoglobin A1c  •  CBC and differential; Future  •  Comprehensive metabolic panel; Future  •  Vitamin D 25 hydroxy; Future  •  Lipid Panel with Direct LDL reflex; Future  •  Albumin / creatinine urine ratio; Future    Prostate cancer screening    Orders:  •  PSA, Total Screen; Future    Vitamin D insufficiency    Orders:  •  Vitamin D 25 hydroxy; Future    Obesity (BMI 30-39.9)    Orders:  •  CBC and differential; Future  •  Comprehensive metabolic panel; Future  •  Vitamin D 25 hydroxy; Future  •  Lipid Panel with Direct LDL reflex; Future    Atherosclerosis of native coronary artery of native heart without angina pectoris    Orders:  •  Lipid Panel with Direct LDL reflex; Future           History of Present Illness     Franco is a 64-year-old male with past medical history of CAD status post CABG x 2,  "hypertension, type 2 diabetes, hyperlipidemia who presents today for follow-up regarding chronic conditions.  Notes that overall he has been feeling well.  Notes that his home blood pressure this morning was 140/86.  Patient does have known whitecoat hypertension.      Review of Systems   Constitutional:  Negative for fatigue and fever.   HENT:  Negative for congestion and sore throat.    Respiratory:  Negative for cough and shortness of breath.    Cardiovascular:  Negative for chest pain and palpitations.   Gastrointestinal:  Negative for abdominal pain, blood in stool, constipation, diarrhea, nausea and vomiting.   Genitourinary:  Negative for dysuria and hematuria.   Musculoskeletal:  Negative for arthralgias and myalgias.   Skin:  Negative for rash.   Neurological:  Negative for dizziness and headaches.   Psychiatric/Behavioral:  Negative for dysphoric mood. The patient is not nervous/anxious.        Objective   /100 (BP Location: Left arm, Patient Position: Sitting, Cuff Size: Large)   Pulse 64   Temp (!) 96.4 °F (35.8 °C) (Tympanic)   Ht 5' 7\" (1.702 m)   Wt 102 kg (225 lb)   SpO2 94%   BMI 35.24 kg/m²      Physical Exam  Vitals reviewed.   Constitutional:       General: He is not in acute distress.     Appearance: Normal appearance.   HENT:      Head: Normocephalic and atraumatic.      Right Ear: External ear normal.      Left Ear: External ear normal.      Mouth/Throat:      Mouth: Mucous membranes are moist.      Pharynx: Oropharynx is clear.   Eyes:      General: No scleral icterus.        Right eye: No discharge.         Left eye: No discharge.      Conjunctiva/sclera: Conjunctivae normal.   Cardiovascular:      Rate and Rhythm: Normal rate and regular rhythm.      Heart sounds: No murmur heard.     No friction rub. No gallop.   Pulmonary:      Effort: Pulmonary effort is normal.      Breath sounds: No wheezing, rhonchi or rales.   Abdominal:      General: Bowel sounds are normal.      " Palpations: Abdomen is soft.      Tenderness: There is no abdominal tenderness. There is no guarding or rebound.   Musculoskeletal:      Right lower leg: No edema.      Left lower leg: No edema.   Skin:     General: Skin is warm and dry.   Neurological:      General: No focal deficit present.      Mental Status: He is alert and oriented to person, place, and time.   Psychiatric:         Mood and Affect: Mood normal.         Behavior: Behavior normal.

## 2025-01-06 NOTE — ASSESSMENT & PLAN NOTE
In office blood pressures remain uncontrolled.  Home blood pressures are borderline.  Recommend increasing amlodipine to 5 mg daily from 2.5 mg daily.  Recommend follow-up in 3 months or sooner as needed.    Orders:  •  amLODIPine (NORVASC) 5 mg tablet; Take 1 tablet (5 mg total) by mouth daily  •  CBC and differential; Future  •  Comprehensive metabolic panel; Future  •  Vitamin D 25 hydroxy; Future  •  Lipid Panel with Direct LDL reflex; Future

## 2025-01-06 NOTE — PROGRESS NOTES
Diabetic Foot Exam    Patient's shoes and socks removed.    Right Foot/Ankle   Right Foot Inspection  Skin Exam: skin normal and skin intact. No dry skin, no warmth, no callus, no erythema, no maceration, no abnormal color, no pre-ulcer, no ulcer and no callus.     Toe Exam: ROM and strength within normal limits.     Sensory   Monofilament testing: intact    Vascular  The right DP pulse is 0. The right PT pulse is 0.     Left Foot/Ankle  Left Foot Inspection  Skin Exam: skin normal and skin intact. No dry skin, no warmth, no erythema, no maceration, normal color, no pre-ulcer, no ulcer and no callus.     Toe Exam: ROM and strength within normal limits.     Sensory   Monofilament testing: intact    Vascular  The left DP pulse is 0. The left PT pulse is 0.     Assign Risk Category  Deformity present  No loss of protective sensation  No weak pulses  Risk: 0

## 2025-01-06 NOTE — ASSESSMENT & PLAN NOTE
Patient continues with upward trend in hemoglobin A1c.  Goal hemoglobin A1c less than 7.  Patient prefers to trial lifestyle changes for management of this including decreasing alcohol consumption.  Plan to recheck labs in 3 months to reassess this.    Lab Results   Component Value Date    HGBA1C 7.8 (A) 01/06/2025     Orders:  •  POCT hemoglobin A1c  •  CBC and differential; Future  •  Comprehensive metabolic panel; Future  •  Vitamin D 25 hydroxy; Future  •  Lipid Panel with Direct LDL reflex; Future  •  Albumin / creatinine urine ratio; Future

## 2025-01-15 DIAGNOSIS — Z79.4 TYPE 2 DIABETES MELLITUS WITH DIABETIC POLYNEUROPATHY, WITH LONG-TERM CURRENT USE OF INSULIN (HCC): ICD-10-CM

## 2025-01-15 DIAGNOSIS — E11.42 TYPE 2 DIABETES MELLITUS WITH DIABETIC POLYNEUROPATHY, WITH LONG-TERM CURRENT USE OF INSULIN (HCC): ICD-10-CM

## 2025-01-15 RX ORDER — INSULIN GLARGINE 100 [IU]/ML
INJECTION, SOLUTION SUBCUTANEOUS
Qty: 15 ML | Refills: 1 | Status: SHIPPED | OUTPATIENT
Start: 2025-01-15

## 2025-03-19 DIAGNOSIS — Z79.4 TYPE 2 DIABETES MELLITUS WITH DIABETIC POLYNEUROPATHY, WITH LONG-TERM CURRENT USE OF INSULIN (HCC): ICD-10-CM

## 2025-03-19 DIAGNOSIS — E11.42 TYPE 2 DIABETES MELLITUS WITH DIABETIC POLYNEUROPATHY, WITH LONG-TERM CURRENT USE OF INSULIN (HCC): ICD-10-CM

## 2025-03-20 RX ORDER — INSULIN GLARGINE 100 [IU]/ML
INJECTION, SOLUTION SUBCUTANEOUS
Qty: 15 ML | Refills: 5 | Status: SHIPPED | OUTPATIENT
Start: 2025-03-20

## 2025-04-01 ENCOUNTER — APPOINTMENT (OUTPATIENT)
Dept: LAB | Facility: MEDICAL CENTER | Age: 65
End: 2025-04-01
Payer: COMMERCIAL

## 2025-04-01 ENCOUNTER — RESULTS FOLLOW-UP (OUTPATIENT)
Dept: FAMILY MEDICINE CLINIC | Facility: CLINIC | Age: 65
End: 2025-04-01

## 2025-04-01 DIAGNOSIS — I10 ESSENTIAL HYPERTENSION: ICD-10-CM

## 2025-04-01 DIAGNOSIS — E55.9 VITAMIN D INSUFFICIENCY: ICD-10-CM

## 2025-04-01 DIAGNOSIS — E66.9 OBESITY (BMI 30-39.9): ICD-10-CM

## 2025-04-01 DIAGNOSIS — Z12.5 PROSTATE CANCER SCREENING: ICD-10-CM

## 2025-04-01 DIAGNOSIS — I25.10 ATHEROSCLEROSIS OF NATIVE CORONARY ARTERY OF NATIVE HEART WITHOUT ANGINA PECTORIS: ICD-10-CM

## 2025-04-01 DIAGNOSIS — Z79.4 TYPE 2 DIABETES MELLITUS WITH DIABETIC POLYNEUROPATHY, WITH LONG-TERM CURRENT USE OF INSULIN (HCC): ICD-10-CM

## 2025-04-01 DIAGNOSIS — E11.42 TYPE 2 DIABETES MELLITUS WITH DIABETIC POLYNEUROPATHY, WITH LONG-TERM CURRENT USE OF INSULIN (HCC): ICD-10-CM

## 2025-04-01 LAB
25(OH)D3 SERPL-MCNC: 82.8 NG/ML (ref 30–100)
ALBUMIN SERPL BCG-MCNC: 4.5 G/DL (ref 3.5–5)
ALP SERPL-CCNC: 83 U/L (ref 34–104)
ALT SERPL W P-5'-P-CCNC: 19 U/L (ref 7–52)
ANION GAP SERPL CALCULATED.3IONS-SCNC: 10 MMOL/L (ref 4–13)
AST SERPL W P-5'-P-CCNC: 21 U/L (ref 13–39)
BASOPHILS # BLD AUTO: 0.08 THOUSANDS/ÂΜL (ref 0–0.1)
BASOPHILS NFR BLD AUTO: 1 % (ref 0–1)
BILIRUB SERPL-MCNC: 0.63 MG/DL (ref 0.2–1)
BUN SERPL-MCNC: 15 MG/DL (ref 5–25)
CALCIUM SERPL-MCNC: 9.7 MG/DL (ref 8.4–10.2)
CHLORIDE SERPL-SCNC: 101 MMOL/L (ref 96–108)
CHOLEST SERPL-MCNC: 87 MG/DL (ref ?–200)
CO2 SERPL-SCNC: 29 MMOL/L (ref 21–32)
CREAT SERPL-MCNC: 0.92 MG/DL (ref 0.6–1.3)
CREAT UR-MCNC: 78.2 MG/DL
EOSINOPHIL # BLD AUTO: 0.35 THOUSAND/ÂΜL (ref 0–0.61)
EOSINOPHIL NFR BLD AUTO: 5 % (ref 0–6)
ERYTHROCYTE [DISTWIDTH] IN BLOOD BY AUTOMATED COUNT: 12.8 % (ref 11.6–15.1)
GFR SERPL CREATININE-BSD FRML MDRD: 88 ML/MIN/1.73SQ M
GLUCOSE P FAST SERPL-MCNC: 110 MG/DL (ref 65–99)
HCT VFR BLD AUTO: 41.8 % (ref 36.5–49.3)
HDLC SERPL-MCNC: 36 MG/DL
HGB BLD-MCNC: 13.9 G/DL (ref 12–17)
IMM GRANULOCYTES # BLD AUTO: 0.03 THOUSAND/UL (ref 0–0.2)
IMM GRANULOCYTES NFR BLD AUTO: 0 % (ref 0–2)
LDLC SERPL CALC-MCNC: 29 MG/DL (ref 0–100)
LYMPHOCYTES # BLD AUTO: 1.97 THOUSANDS/ÂΜL (ref 0.6–4.47)
LYMPHOCYTES NFR BLD AUTO: 28 % (ref 14–44)
MCH RBC QN AUTO: 29.4 PG (ref 26.8–34.3)
MCHC RBC AUTO-ENTMCNC: 33.3 G/DL (ref 31.4–37.4)
MCV RBC AUTO: 88 FL (ref 82–98)
MICROALBUMIN UR-MCNC: 128.8 MG/L
MICROALBUMIN/CREAT 24H UR: 165 MG/G CREATININE (ref 0–30)
MONOCYTES # BLD AUTO: 0.78 THOUSAND/ÂΜL (ref 0.17–1.22)
MONOCYTES NFR BLD AUTO: 11 % (ref 4–12)
NEUTROPHILS # BLD AUTO: 3.87 THOUSANDS/ÂΜL (ref 1.85–7.62)
NEUTS SEG NFR BLD AUTO: 55 % (ref 43–75)
NRBC BLD AUTO-RTO: 0 /100 WBCS
PLATELET # BLD AUTO: 266 THOUSANDS/UL (ref 149–390)
PMV BLD AUTO: 9.6 FL (ref 8.9–12.7)
POTASSIUM SERPL-SCNC: 4.7 MMOL/L (ref 3.5–5.3)
PROT SERPL-MCNC: 7.2 G/DL (ref 6.4–8.4)
PSA SERPL-MCNC: 0.5 NG/ML (ref 0–4)
RBC # BLD AUTO: 4.73 MILLION/UL (ref 3.88–5.62)
SODIUM SERPL-SCNC: 140 MMOL/L (ref 135–147)
TRIGL SERPL-MCNC: 112 MG/DL (ref ?–150)
WBC # BLD AUTO: 7.08 THOUSAND/UL (ref 4.31–10.16)

## 2025-04-01 PROCEDURE — 82570 ASSAY OF URINE CREATININE: CPT

## 2025-04-01 PROCEDURE — 82043 UR ALBUMIN QUANTITATIVE: CPT

## 2025-04-01 PROCEDURE — 36415 COLL VENOUS BLD VENIPUNCTURE: CPT

## 2025-04-01 PROCEDURE — 80061 LIPID PANEL: CPT

## 2025-04-01 PROCEDURE — 85025 COMPLETE CBC W/AUTO DIFF WBC: CPT

## 2025-04-01 PROCEDURE — G0103 PSA SCREENING: HCPCS

## 2025-04-01 PROCEDURE — 82306 VITAMIN D 25 HYDROXY: CPT

## 2025-04-01 PROCEDURE — 80053 COMPREHEN METABOLIC PANEL: CPT

## 2025-04-10 ENCOUNTER — OFFICE VISIT (OUTPATIENT)
Dept: FAMILY MEDICINE CLINIC | Facility: CLINIC | Age: 65
End: 2025-04-10
Payer: COMMERCIAL

## 2025-04-10 VITALS
WEIGHT: 221 LBS | OXYGEN SATURATION: 98 % | HEART RATE: 86 BPM | TEMPERATURE: 96.8 F | BODY MASS INDEX: 34.69 KG/M2 | SYSTOLIC BLOOD PRESSURE: 154 MMHG | HEIGHT: 67 IN | DIASTOLIC BLOOD PRESSURE: 94 MMHG

## 2025-04-10 DIAGNOSIS — E11.42 TYPE 2 DIABETES MELLITUS WITH DIABETIC POLYNEUROPATHY, WITH LONG-TERM CURRENT USE OF INSULIN (HCC): ICD-10-CM

## 2025-04-10 DIAGNOSIS — Z00.00 ANNUAL PHYSICAL EXAM: Primary | ICD-10-CM

## 2025-04-10 DIAGNOSIS — E78.2 MIXED HYPERLIPIDEMIA: ICD-10-CM

## 2025-04-10 DIAGNOSIS — I10 ESSENTIAL HYPERTENSION: ICD-10-CM

## 2025-04-10 DIAGNOSIS — Z79.4 TYPE 2 DIABETES MELLITUS WITH DIABETIC POLYNEUROPATHY, WITH LONG-TERM CURRENT USE OF INSULIN (HCC): ICD-10-CM

## 2025-04-10 LAB — SL AMB POCT HEMOGLOBIN AIC: 7.5 (ref ?–6.5)

## 2025-04-10 PROCEDURE — 99214 OFFICE O/P EST MOD 30 MIN: CPT | Performed by: FAMILY MEDICINE

## 2025-04-10 PROCEDURE — 99396 PREV VISIT EST AGE 40-64: CPT | Performed by: FAMILY MEDICINE

## 2025-04-10 PROCEDURE — 83036 HEMOGLOBIN GLYCOSYLATED A1C: CPT | Performed by: FAMILY MEDICINE

## 2025-04-10 RX ORDER — BLOOD SUGAR DIAGNOSTIC
STRIP MISCELLANEOUS
Qty: 200 EACH | Refills: 3 | Status: SHIPPED | OUTPATIENT
Start: 2025-04-10

## 2025-04-10 RX ORDER — LANCETS 33 GAUGE
EACH MISCELLANEOUS
Qty: 200 EACH | Refills: 3 | Status: SHIPPED | OUTPATIENT
Start: 2025-04-10

## 2025-04-10 RX ORDER — BLOOD-GLUCOSE METER
KIT MISCELLANEOUS
Qty: 1 KIT | Refills: 0 | Status: SHIPPED | OUTPATIENT
Start: 2025-04-10

## 2025-04-10 RX ORDER — AMLODIPINE BESYLATE 2.5 MG/1
2.5 TABLET ORAL DAILY
Qty: 90 TABLET | Refills: 3 | Status: SHIPPED | OUTPATIENT
Start: 2025-04-10

## 2025-04-10 NOTE — ASSESSMENT & PLAN NOTE
Patient notes home blood pressures consistently in the 140s systolic.  Shown consistent improvement over the past several months however continues to be suboptimally controlled.  Recommend increasing amlodipine to 7.5 mg daily from 5 mg daily.  Continue current losartan.  Recommend follow-up in 3 months continue to monitor home blood pressures.    Orders:  •  amLODIPine (NORVASC) 2.5 mg tablet; Take 1 tablet (2.5 mg total) by mouth daily

## 2025-04-10 NOTE — ASSESSMENT & PLAN NOTE
Recent lipid panel with very low LDL.  Consideration to decrease rosuvastatin to 20 mg daily.  Recommend discussing with his cardiologist.  Recommend follow-up in 3 months or sooner as needed.

## 2025-04-10 NOTE — ASSESSMENT & PLAN NOTE
Lab Results   Component Value Date    HGBA1C 7.5 (A) 04/10/2025     Some improvement though continues with suboptimal blood sugar control.  Patient notes that he recently has decreased alcohol consumption to weekends only.  Given that this change was only recently may continue with current regimen and see if patient is able to reach goal with lifestyle changes alone.  Recommend follow-up in 3 months or sooner as needed.    Orders:  •  POCT hemoglobin A1c  •  Blood Glucose Monitoring Suppl (OneTouch Verio Reflect) w/Device KIT; Check blood sugars twice daily. Please substitute with appropriate alternative as covered by patient's insurance. Dx: E11.65  •  glucose blood (OneTouch Verio) test strip; Check blood sugars twice daily. Please substitute with appropriate alternative as covered by patient's insurance. Dx: E11.65  •  OneTouch Delica Lancets 33G MISC; Check blood sugars twice daily. Please substitute with appropriate alternative as covered by patient's insurance. Dx: E11.65

## 2025-04-10 NOTE — PATIENT INSTRUCTIONS
"Patient Education     Routine physical for adults   The Basics   Written by the doctors and editors at [facility]   What is a physical? -- A physical is a routine visit, or \"check-up,\" with your doctor. You might also hear it called a \"wellness visit\" or \"preventive visit.\"  During each visit, the doctor will:   Ask about your physical and mental health   Ask about your habits, behaviors, and lifestyle   Do an exam   Give you vaccines if needed   Talk to you about any medicines you take   Give advice about your health   Answer your questions  Getting regular check-ups is an important part of taking care of your health. It can help your doctor find and treat any problems you have. But it's also important for preventing health problems.  A routine physical is different from a \"sick visit.\" A sick visit is when you see a doctor because of a health concern or problem. Since physicals are scheduled ahead of time, you can think about what you want to ask the doctor.  How often should I get a physical? -- It depends on your age and health. In general, for people age 21 years and older:   If you are younger than 50 years, you might be able to get a physical every 3 years.   If you are 50 years or older, your doctor might recommend a physical every year.  If you have an ongoing health condition, like diabetes or high blood pressure, your doctor will probably want to see you more often.  What happens during a physical? -- In general, each visit will include:   Physical exam - The doctor or nurse will check your height, weight, heart rate, and blood pressure. They will also look at your eyes and ears. They will ask about how you are feeling and whether you have any symptoms that bother you.   Medicines - It's a good idea to bring a list of all the medicines you take to each doctor visit. Your doctor will talk to you about your medicines and answer any questions. Tell them if you are having any side effects that bother you. You " "should also tell them if you are having trouble paying for any of your medicines.   Habits and behaviors - This includes:   Your diet   Your exercise habits   Whether you smoke, drink alcohol, or use drugs   Whether you are sexually active   Whether you feel safe at home  Your doctor will talk to you about things you can do to improve your health and lower your risk of health problems. They will also offer help and support. For example, if you want to quit smoking, they can give you advice and might prescribe medicines. If you want to improve your diet or get more physical activity, they can help you with this, too.   Lab tests, if needed - The tests you get will depend on your age and situation. For example, your doctor might want to check your:   Cholesterol   Blood sugar   Iron level   Vaccines - The recommended vaccines will depend on your age, health, and what vaccines you already had. Vaccines are very important because they can prevent certain serious or deadly infections.   Discussion of screening - \"Screening\" means checking for diseases or other health problems before they cause symptoms. Your doctor can recommend screening based on your age, risk, and preferences. This might include tests to check for:   Cancer, such as breast, prostate, cervical, ovarian, colorectal, prostate, lung, or skin cancer   Sexually transmitted infections, such as chlamydia and gonorrhea   Mental health conditions like depression and anxiety  Your doctor will talk to you about the different types of screening tests. They can help you decide which screenings to have. They can also explain what the results might mean.   Answering questions - The physical is a good time to ask the doctor or nurse questions about your health. If needed, they can refer you to other doctors or specialists, too.  Adults older than 65 years often need other care, too. As you get older, your doctor will talk to you about:   How to prevent falling at " home   Hearing or vision tests   Memory testing   How to take your medicines safely   Making sure that you have the help and support you need at home  All topics are updated as new evidence becomes available and our peer review process is complete.  This topic retrieved from [employer] on: May 02, 2024.  Topic 060501 Version 1.0  Release: 32.4.3 - C32.122  © 2024 UpToDate, Inc. and/or its affiliates. All rights reserved.  Consumer Information Use and Disclaimer   Disclaimer: This generalized information is a limited summary of diagnosis, treatment, and/or medication information. It is not meant to be comprehensive and should be used as a tool to help the user understand and/or assess potential diagnostic and treatment options. It does NOT include all information about conditions, treatments, medications, side effects, or risks that may apply to a specific patient. It is not intended to be medical advice or a substitute for the medical advice, diagnosis, or treatment of a health care provider based on the health care provider's examination and assessment of a patient's specific and unique circumstances. Patients must speak with a health care provider for complete information about their health, medical questions, and treatment options, including any risks or benefits regarding use of medications. This information does not endorse any treatments or medications as safe, effective, or approved for treating a specific patient. UpToDate, Inc. and its affiliates disclaim any warranty or liability relating to this information or the use thereof.The use of this information is governed by the Terms of Use, available at https://www.woltersdxcare.comuwer.com/en/know/clinical-effectiveness-terms. 2024© UpToDate, Inc. and its affiliates and/or licensors. All rights reserved.  Copyright   © 2024 UpToDate, Inc. and/or its affiliates. All rights reserved.

## 2025-04-10 NOTE — PROGRESS NOTES
Adult Annual Physical  Name: Uzair Quiroz      : 1960      MRN: [id number]  Encounter Provider: Jannie Zamarripa DO  Encounter Date: 4/10/2025   Encounter department: [facility]    :  Assessment & Plan  Annual physical exam    Anticipatory guidance is discussed regarding preventative care.  Patient declines colorectal cancer screening at today's visit.  Declines pneumonia and other routine vaccinations.  Recommend follow-up in 3 months for chronic conditions or sooner as needed.         Type 2 diabetes mellitus with diabetic polyneuropathy, with long-term current use of insulin (HCC)    Lab Results   Component Value Date    HGBA1C 7.5 (A) 04/10/2025     Some improvement though continues with suboptimal blood sugar control.  Patient notes that he recently has decreased alcohol consumption to weekends only.  Given that this change was only recently may continue with current regimen and see if patient is able to reach goal with lifestyle changes alone.  Recommend follow-up in 3 months or sooner as needed.    Orders:  •  POCT hemoglobin A1c  •  Blood Glucose Monitoring Suppl (OneTouch Verio Reflect) w/Device KIT; Check blood sugars twice daily. Please substitute with appropriate alternative as covered by patient's insurance. Dx: E11.65  •  glucose blood (OneTouch Verio) test strip; Check blood sugars twice daily. Please substitute with appropriate alternative as covered by patient's insurance. Dx: E11.65  •  OneTouch Delica Lancets 33G MISC; Check blood sugars twice daily. Please substitute with appropriate alternative as covered by patient's insurance. Dx: E11.65    Essential hypertension    Patient notes home blood pressures consistently in the 140s systolic.  Shown consistent improvement over the past several months however continues to be suboptimally controlled.  Recommend increasing amlodipine to 7.5 mg daily from 5 mg daily.  Continue current losartan.  Recommend follow-up in 3  months continue to monitor home blood pressures.    Orders:  •  amLODIPine (NORVASC) 2.5 mg tablet; Take 1 tablet (2.5 mg total) by mouth daily    Mixed hyperlipidemia    Recent lipid panel with very low LDL.  Consideration to decrease rosuvastatin to 20 mg daily.  Recommend discussing with his cardiologist.  Recommend follow-up in 3 months or sooner as needed.         Type 2 diabetes mellitus with diabetic polyneuropathy, with long-term current use of insulin ([facility])    Lab Results   Component Value Date    HGBA1C 7.5 (A) 04/10/2025          Screening for colorectal cancer         Annual physical exam               Preventive Screenings:  - Diabetes Screening: screening not indicated and has diabetes  - Cholesterol Screening: screening not indicated and has hyperlipidemia   - Hepatitis C screening: screening up-to-date   - HIV screening: screening up-to-date   - Lung cancer screening: screening not indicated   - Prostate cancer screening: screening up-to-date     Immunizations:  - Immunizations due: Influenza, Prevnar 20, Tdap and Zoster (Shingrix)      Depression Screening and Follow-up Plan: Patient was screened for depression during today's encounter. They screened negative with a PHQ-2 score of 0.          History of Present Illness     Adult Annual Physical:  Patient presents for annual physical.     Diet and Physical Activity:  - Diet/Nutrition: well balanced diet, portion control, limited junk food, consuming 3-5 servings of fruits/vegetables daily, adequate fiber intake and adequate whole grain intake.  - Exercise: walking and 1-2 hours on average.    Depression Screening:  - PHQ-2 Score: 0    General Health:  - Sleep: sleeps well and 7-8 hours of sleep on average.  - Hearing: tinnitus.  - Vision: wears glasses.  - Dental: regular dental visits, brushes teeth twice daily and floss regularly.    /GYN Health:  - Follows with GYN: no.   - History of STDs: no     Health:  - History of STDs: no.   - Urinary  symptoms: none.     Advanced Care Planning:  - Has an advanced directive?: no    - Has a durable medical POA?: yes      Review of Systems   Constitutional:  Negative for fatigue and fever.   HENT:  Negative for congestion and sore throat.    Respiratory:  Negative for cough and shortness of breath.    Cardiovascular:  Negative for chest pain and palpitations.   Gastrointestinal:  Negative for abdominal pain, blood in stool, constipation, diarrhea, nausea and vomiting.   Genitourinary:  Negative for dysuria and hematuria.   Musculoskeletal:  Negative for arthralgias and myalgias.   Skin:  Negative for rash.   Neurological:  Negative for dizziness and headaches.   Psychiatric/Behavioral:  Negative for dysphoric mood. The patient is not nervous/anxious.      Medical History Reviewed by provider this encounter:     .  Past Medical History   Past Medical History:   Diagnosis Date   • Diabetes mellitus (HCC)    • Hypertension      Past Surgical History:   Procedure Laterality Date   • CARDIAC CATHETERIZATION  9/23/2021   • INCISION AND DRAINAGE OF WOUND Right 9/8/2019    Procedure: INCISION AND DRAINAGE (I&D) EXTREMITY;  Surgeon: Leroy Coats DPM;  Location: [facility];  Service: Podiatry   • [place] CORONARY ARTERY BYP W/VEIN & ARTERY GRAFT 2 VEIN N/A 9/27/2021    Procedure: CORONARY ARTERY BYPASS GRAFT (CABG) 2 VESSELS MALKA to LAD -SVG--> L Posterior lateral, EVH from Left leg;  Surgeon: ARON Simon MD;  Location:  MAIN OR;  Service: Cardiac Surgery     No family history on file.   reports that he has never smoked. He has never used smokeless tobacco. He reports that he does not currently use alcohol after a past usage of about 30.0 standard drinks of alcohol per week. He reports that he does not use drugs.  Current Outpatient Medications   Medication Instructions   • amLODIPine (NORVASC) 5 mg, Oral, Daily   • amLODIPine (NORVASC) 2.5 mg, Oral, Daily   • Aspirin Low Dose 81 mg, Oral, Daily   • Blood Glucose  Monitoring Suppl (OneTouch Verio Reflect) w/Device KIT Check blood sugars twice daily. Please substitute with appropriate alternative as covered by patient's insurance. Dx: E11.65   • cholecalciferol (VITAMIN D3) 4,000 Units, Daily   • glucose blood (OneTouch Verio) test strip Check blood sugars twice daily. Please substitute with appropriate alternative as covered by patient's insurance. Dx: E11.65   • Lantus SoloStar 100 units/mL SOPN INJECT 44 UNITS SUBCUTANEOUSLY (UNDER THE SKIN) EVERY DAY AT BEDTIME   • losartan (COZAAR) 100 mg, Oral, Daily   • metFORMIN (GLUCOPHAGE) 1,000 mg, Oral, 2 times daily with meals   • Metoprolol Tartrate 37.5 mg, Oral, Every 12 hours   • OneTouch Delica Lancets 33G MISC Check blood sugars twice daily. Please substitute with appropriate alternative as covered by patient's insurance. Dx: E11.65   • rosuvastatin (CRESTOR) 40 mg, Oral, Daily   No Known Allergies   Current Outpatient Medications on File Prior to Visit   Medication Sig Dispense Refill   • amLODIPine (NORVASC) 5 mg tablet Take 1 tablet (5 mg total) by mouth daily 90 tablet 3   • aspirin (Aspirin Low Dose) 81 mg EC tablet TAKE 1 TABLET BY MOUTH EVERY DAY 90 tablet 1   • cholecalciferol (VITAMIN D3) 1,000 units tablet Take 4,000 Units by mouth daily     • Lantus SoloStar 100 units/mL SOPN INJECT 44 UNITS SUBCUTANEOUSLY (UNDER THE SKIN) EVERY DAY AT BEDTIME 15 mL 5   • losartan (COZAAR) 100 MG tablet TAKE 1 TABLET BY MOUTH EVERY DAY 90 tablet 1   • metFORMIN (GLUCOPHAGE) 1000 MG tablet TAKE 1 TABLET BY MOUTH TWO TIMES DAILY WITH MEALS 180 tablet 1   • Metoprolol Tartrate 37.5 MG TABS TAKE 1 TABLET BY MOUTH EVERY 12 HOURS 180 tablet 1   • rosuvastatin (CRESTOR) 40 MG tablet TAKE 1 TABLET BY MOUTH EVERY DAY 90 tablet 1     No current facility-administered medications on file prior to visit.      Social History     Tobacco Use   • Smoking status: Never   • Smokeless tobacco: Never   Vaping Use   • Vaping status: Never Used  "  Substance and Sexual Activity   • Alcohol use: Not Currently     Alcohol/week: 30.0 standard drinks of alcohol     Types: 30 Cans of beer per week     Comment: 5-6 beers 4-5 x per week    • Drug use: Never   • Sexual activity: Not on file       Objective   /94 (BP Location: Left arm, Patient Position: Sitting, Cuff Size: Standard)   Pulse 86   Temp (!) 96.8 °F (36 °C) (Tympanic)   Ht 5' 7\" (1.702 m)   Wt 100 kg (221 lb)   SpO2 98%   BMI 34.61 kg/m²     Physical Exam  Vitals reviewed.   Constitutional:       General: He is not in acute distress.     Appearance: Normal appearance.   HENT:      Head: Normocephalic and atraumatic.      Right Ear: Tympanic membrane, ear canal and external ear normal.      Left Ear: Tympanic membrane, ear canal and external ear normal.      Mouth/Throat:      Mouth: Mucous membranes are moist.      Pharynx: Oropharynx is clear.   Eyes:      General: No scleral icterus.        Right eye: No discharge.         Left eye: No discharge.      Conjunctiva/sclera: Conjunctivae normal.      Pupils: Pupils are equal, round, and reactive to light.   Cardiovascular:      Rate and Rhythm: Normal rate and regular rhythm.      Heart sounds: No murmur heard.     No friction rub. No gallop.   Pulmonary:      Effort: Pulmonary effort is normal.      Breath sounds: No wheezing, rhonchi or rales.   Abdominal:      General: Bowel sounds are normal.      Palpations: Abdomen is soft.      Tenderness: There is no abdominal tenderness. There is no guarding or rebound.   Musculoskeletal:      Right lower leg: No edema.      Left lower leg: No edema.   Skin:     General: Skin is warm and dry.   Neurological:      General: No focal deficit present.      Mental Status: He is alert and oriented to person, place, and time.   Psychiatric:         Mood and Affect: Mood normal.         Behavior: Behavior normal.         "

## 2025-04-10 NOTE — ASSESSMENT & PLAN NOTE
Anticipatory guidance is discussed regarding preventative care.  Patient declines colorectal cancer screening at today's visit.  Declines pneumonia and other routine vaccinations.  Recommend follow-up in 3 months for chronic conditions or sooner as needed.

## 2025-05-18 DIAGNOSIS — E11.42 TYPE 2 DIABETES MELLITUS WITH DIABETIC POLYNEUROPATHY, WITHOUT LONG-TERM CURRENT USE OF INSULIN (HCC): ICD-10-CM

## 2025-05-18 DIAGNOSIS — I24.9 ACS (ACUTE CORONARY SYNDROME) (HCC): ICD-10-CM

## 2025-05-18 DIAGNOSIS — Z95.1 S/P CABG X 2: ICD-10-CM

## 2025-05-19 RX ORDER — ROSUVASTATIN CALCIUM 40 MG/1
40 TABLET, COATED ORAL DAILY
Qty: 90 TABLET | Refills: 3 | Status: SHIPPED | OUTPATIENT
Start: 2025-05-19

## 2025-06-05 ENCOUNTER — VBI (OUTPATIENT)
Dept: ADMINISTRATIVE | Facility: OTHER | Age: 65
End: 2025-06-05

## 2025-06-05 NOTE — TELEPHONE ENCOUNTER
06/05/25 8:33 AM     Chart reviewed for   [facility] Ex[facility]    ; nothing is submitted to the patient's insurance at this time.     MARCELINO GONSALEZ MA   [facility]

## 2025-06-12 ENCOUNTER — TELEPHONE (OUTPATIENT)
Dept: FAMILY MEDICINE CLINIC | Facility: CLINIC | Age: 65
End: 2025-06-12

## 2025-06-12 DIAGNOSIS — I10 ESSENTIAL HYPERTENSION: ICD-10-CM

## 2025-06-12 DIAGNOSIS — I24.9 ACS (ACUTE CORONARY SYNDROME) (HCC): ICD-10-CM

## 2025-06-12 DIAGNOSIS — E11.42 TYPE 2 DIABETES MELLITUS WITH DIABETIC POLYNEUROPATHY, WITHOUT LONG-TERM CURRENT USE OF INSULIN (HCC): ICD-10-CM

## 2025-06-12 DIAGNOSIS — Z95.1 S/P CABG X 2: ICD-10-CM

## 2025-06-12 RX ORDER — LOSARTAN POTASSIUM 100 MG/1
100 TABLET ORAL DAILY
Qty: 90 TABLET | Refills: 1 | Status: SHIPPED | OUTPATIENT
Start: 2025-06-12

## 2025-06-12 NOTE — TELEPHONE ENCOUNTER
Called pt left VM for pt to either set up an appt or possibly have PCP refill meds if dosent need an appt here

## 2025-06-12 NOTE — TELEPHONE ENCOUNTER
Spouse called to request Metoprolol Tartrate 37.5 MG 1 every 12 hours, be ordered by PCP (formerly cardio) since he was just seen recently    Please send to [facility] [place]  Requesting a [employer] message to confirm when complete.

## 2025-06-13 ENCOUNTER — TELEPHONE (OUTPATIENT)
Dept: FAMILY MEDICINE CLINIC | Facility: CLINIC | Age: 65
End: 2025-06-13

## 2025-06-13 RX ORDER — METOPROLOL TARTRATE 37.5 MG/1
1 TABLET ORAL 2 TIMES DAILY
Qty: 180 TABLET | Refills: 1 | OUTPATIENT
Start: 2025-06-13

## 2025-06-13 NOTE — TELEPHONE ENCOUNTER
Pt wife called and is requesting a refill to metoprolol  was prescribed by old pcp and pt needs it fill by new pcp. Please send to [facility] [place]

## 2025-06-14 RX ORDER — METOPROLOL TARTRATE 37.5 MG/1
1 TABLET ORAL EVERY 12 HOURS
Qty: 180 TABLET | Refills: 1 | Status: SHIPPED | OUTPATIENT
Start: 2025-06-14

## 2025-07-25 ENCOUNTER — TELEPHONE (OUTPATIENT)
Dept: FAMILY MEDICINE CLINIC | Facility: CLINIC | Age: 65
End: 2025-07-25

## 2025-08-20 ENCOUNTER — OFFICE VISIT (OUTPATIENT)
Dept: FAMILY MEDICINE CLINIC | Facility: CLINIC | Age: 65
End: 2025-08-20
Payer: COMMERCIAL

## 2025-08-20 ENCOUNTER — TELEPHONE (OUTPATIENT)
Age: 65
End: 2025-08-20

## 2025-08-20 VITALS
BODY MASS INDEX: 32.14 KG/M2 | OXYGEN SATURATION: 95 % | HEART RATE: 66 BPM | WEIGHT: 217 LBS | DIASTOLIC BLOOD PRESSURE: 78 MMHG | SYSTOLIC BLOOD PRESSURE: 168 MMHG | HEIGHT: 69 IN | TEMPERATURE: 97 F

## 2025-08-20 DIAGNOSIS — E11.42 TYPE 2 DIABETES MELLITUS WITH DIABETIC POLYNEUROPATHY, WITH LONG-TERM CURRENT USE OF INSULIN (HCC): ICD-10-CM

## 2025-08-20 DIAGNOSIS — Z79.4 TYPE 2 DIABETES MELLITUS WITH DIABETIC POLYNEUROPATHY, WITH LONG-TERM CURRENT USE OF INSULIN (HCC): ICD-10-CM

## 2025-08-20 DIAGNOSIS — H60.502 ACUTE OTITIS EXTERNA OF LEFT EAR, UNSPECIFIED TYPE: Primary | ICD-10-CM

## 2025-08-20 DIAGNOSIS — I10 ESSENTIAL HYPERTENSION: ICD-10-CM

## 2025-08-20 LAB — SL AMB POCT HEMOGLOBIN AIC: 6.4 (ref ?–6.5)

## 2025-08-20 PROCEDURE — 99214 OFFICE O/P EST MOD 30 MIN: CPT | Performed by: FAMILY MEDICINE

## 2025-08-20 PROCEDURE — 69210 REMOVE IMPACTED EAR WAX UNI: CPT | Performed by: FAMILY MEDICINE

## 2025-08-20 PROCEDURE — 83036 HEMOGLOBIN GLYCOSYLATED A1C: CPT | Performed by: FAMILY MEDICINE

## 2025-08-20 RX ORDER — NEOMYCIN SULFATE, POLYMYXIN B SULFATE, HYDROCORTISONE 3.5; 10000; 1 MG/ML; [USP'U]/ML; MG/ML
4 SOLUTION/ DROPS AURICULAR (OTIC) EVERY 6 HOURS SCHEDULED
Qty: 10 ML | Refills: 0 | Status: SHIPPED | OUTPATIENT
Start: 2025-08-20 | End: 2025-08-27

## 2025-08-20 RX ORDER — AMLODIPINE BESYLATE 10 MG/1
10 TABLET ORAL DAILY
Qty: 90 TABLET | Refills: 3 | Status: SHIPPED | OUTPATIENT
Start: 2025-08-20

## 2025-08-21 ENCOUNTER — TELEPHONE (OUTPATIENT)
Dept: ADMINISTRATIVE | Facility: OTHER | Age: 65
End: 2025-08-21

## (undated) DEVICE — CAST PADDING 4 IN SYNTHETIC NON-STRL

## (undated) DEVICE — SCD SEQUENTIAL COMPRESSION COMFORT SLEEVE MEDIUM KNEE LENGTH: Brand: KENDALL SCD

## (undated) DEVICE — DRESSING ALLEVYN LIFE HEEL 25 X 25.2CM

## (undated) DEVICE — PACK CABG PBDS

## (undated) DEVICE — STERNAL WIRE

## (undated) DEVICE — 40601 PROLONGED POSITIONING SYSTEM: Brand: 40601 PROLONGED POSITIONING SYSTEM

## (undated) DEVICE — LIGHT HANDLE COVER SLEEVE DISP BLUE STELLAR

## (undated) DEVICE — LIGACLIP MCA MULTIPLE CLIP APPLIERS, 20 SMALL CLIPS: Brand: LIGACLIP

## (undated) DEVICE — SYRINGE 10ML LL

## (undated) DEVICE — RED RUBBER URETHRAL CATHETER: Brand: DOVER

## (undated) DEVICE — SUT VICRYL PLUS 1 CTB-1 36 IN VCPB947H

## (undated) DEVICE — GLOVE SRG BIOGEL 7.5

## (undated) DEVICE — TUBING INSUFFLATION SET ISO CONNECTOR

## (undated) DEVICE — INTENDED FOR TISSUE SEPARATION, AND OTHER PROCEDURES THAT REQUIRE A SHARP SURGICAL BLADE TO PUNCTURE OR CUT.: Brand: BARD-PARKER SAFETY BLADES SIZE 15, STERILE

## (undated) DEVICE — PLEDGET CARDIO PTFE 9.5 X 4.8 SOFT LF (6EA/PK)

## (undated) DEVICE — ALCON OPHTHALMIC KNIFE 15 °: Brand: ALCON

## (undated) DEVICE — EVERGRIP INSERT SET 86MM: Brand: FOGARTY EVERGRIP

## (undated) DEVICE — OASIS DRAIN, DUAL, IN-LINE, ATS COMPATIBLE: Brand: OASIS

## (undated) DEVICE — SUT MONOCRYL 4-0 PS-2 18 IN Y496G

## (undated) DEVICE — PENCIL ELECTROSURG E-Z CLEAN -0035H

## (undated) DEVICE — WET SKIN PREP TRAY: Brand: MEDLINE INDUSTRIES, INC.

## (undated) DEVICE — THERMOFLECT BLANKET, L, 25EA                               TS THERMOFLECT BLANKET, 48" X 84", SILVER, 5/BG, 5 BG/CS NW: Brand: THERMOFLECT

## (undated) DEVICE — SUT PDS PLUS 1 CTB 36 IN PDPB359T

## (undated) DEVICE — AORTIC PUNCH 5.2 MM DISP

## (undated) DEVICE — INTENDED FOR TISSUE SEPARATION, AND OTHER PROCEDURES THAT REQUIRE A SHARP SURGICAL BLADE TO PUNCTURE OR CUT.: Brand: BARD-PARKER ® CARBON RIB-BACK BLADES

## (undated) DEVICE — Device: Brand: RETRACT-O-TAPE 18G X 30.5CM BLUNT NEEDLE

## (undated) DEVICE — CUFF TOURNIQUET 18 X 4 IN QUICK CONNECT DISP 1 BLADDER

## (undated) DEVICE — GLOVE INDICATOR PI UNDERGLOVE SZ 7.5 BLUE

## (undated) DEVICE — RECIP.STERNUM SAW BLADE 34/7.5/0.7MM: Brand: AESCULAP

## (undated) DEVICE — THE SIMPULSE SOLO SYSTEM WITH ULTREX RETRACTABLE SPLASH SHIELD TIP: Brand: SIMPULSE SOLO

## (undated) DEVICE — SUT PROLENE 5-0 C-1/C-1 36 IN 8321H

## (undated) DEVICE — VASOVIEW HEMOPRO 2: Brand: VASOVIEW HEMOPRO 2

## (undated) DEVICE — SUT SILK 2-0 SH CR/8 18 IN C012D

## (undated) DEVICE — NEEDLE 25G X 1 1/2

## (undated) DEVICE — SUT ETHIBOND 2-0 SH/SH 36 IN X523H

## (undated) DEVICE — BONE WAX WHITE: Brand: BONE WAX WHITE

## (undated) DEVICE — ABDOMINAL PAD: Brand: DERMACEA

## (undated) DEVICE — BLADE BEAVER MINI SZ 69

## (undated) DEVICE — ACE WRAP 4 IN UNSTERILE

## (undated) DEVICE — ELECTRODE BLADE E-Z CLEAN 4IN -0014A

## (undated) DEVICE — SUT PROLENE 7-0 BV-1/BV-1 24 IN 8304H

## (undated) DEVICE — BLANKET HYPOTHERMIA ADULT GAYMAR

## (undated) DEVICE — SYRINGE 50ML LL

## (undated) DEVICE — 3000CC GUARDIAN II: Brand: GUARDIAN

## (undated) DEVICE — KERLIX BANDAGE ROLL: Brand: KERLIX

## (undated) DEVICE — SUT SILK 2 60 IN SA8H

## (undated) DEVICE — CURITY NON-ADHERENT STRIPS: Brand: CURITY

## (undated) DEVICE — SILVER-COATED ANTIBACTERIAL BARRIER DRESSING: Brand: ACTICOAT SURGIC 10X25CM 5PK US

## (undated) DEVICE — 10FR FRAZIER SUCTION HANDLE: Brand: CARDINAL HEALTH

## (undated) DEVICE — SUT SILK 0 CT-1 30 IN 424H

## (undated) DEVICE — ACE WRAP 6 IN UNSTERILE

## (undated) DEVICE — GLOVE SRG BIOGEL ECLIPSE 8

## (undated) DEVICE — 32 FR RIGHT ANGLE – SOFT PVC CATHETER: Brand: PVC THORACIC CATHETERS

## (undated) DEVICE — NEEDLE 18 G X 1 1/2

## (undated) DEVICE — SUCTION CATH 18 FR

## (undated) DEVICE — FILTER SMOKE EVAC VIROSAFE

## (undated) DEVICE — ANTIBACTERIAL UNDYED BRAIDED (POLYGLACTIN 910), SYNTHETIC ABSORBABLE SUTURE: Brand: COATED VICRYL

## (undated) DEVICE — 2000CC GUARDIAN II: Brand: GUARDIAN

## (undated) DEVICE — SUT PROLENE 7-0 BV175-8/BV175-8 24 IN EPM8747

## (undated) DEVICE — ADHESIVE SKIN HIGH VISCOSITY EXOFIN 1ML

## (undated) DEVICE — SUT ETHIBOND 2-0 SH-1/SH-1 30 IN X763H

## (undated) DEVICE — SUT MONOCRYL PLUS 3-0 PS-2 27 IN MCP427H

## (undated) DEVICE — 32 FR STRAIGHT – SOFT PVC CATHETER: Brand: PVC THORACIC CATHETERS

## (undated) DEVICE — GAUZE SPONGES,16 PLY: Brand: CURITY

## (undated) DEVICE — HEMOCLIP CARTRIDGE LRG

## (undated) DEVICE — TUBING SUCTION 5MM X 12 FT

## (undated) DEVICE — GLOVE INDICATOR PI UNDERGLOVE SZ 8 BLUE

## (undated) DEVICE — TRAY FOLEY 16FR SURESTEP TEMP SENS URIMETER STAT LOK

## (undated) DEVICE — BETHLEHEM UNIVERSAL  MIONR EXT: Brand: CARDINAL HEALTH

## (undated) DEVICE — SUT PROLENE 4-0 BB 36 IN 8581H

## (undated) DEVICE — SILVER-COATED ANTIBACTERIAL BARRIER DRESSING: Brand: ACTICOAT SURGIC 10X12CM 5PK US

## (undated) DEVICE — PLUMEPEN PRO 10FT